# Patient Record
Sex: FEMALE | Race: WHITE | NOT HISPANIC OR LATINO | Employment: FULL TIME | URBAN - METROPOLITAN AREA
[De-identification: names, ages, dates, MRNs, and addresses within clinical notes are randomized per-mention and may not be internally consistent; named-entity substitution may affect disease eponyms.]

---

## 2017-09-15 ENCOUNTER — OFFICE VISIT (OUTPATIENT)
Dept: OBSTETRICS AND GYNECOLOGY | Age: 52
End: 2017-09-15

## 2017-09-15 DIAGNOSIS — E03.4 HYPOTHYROIDISM DUE TO ACQUIRED ATROPHY OF THYROID: ICD-10-CM

## 2017-09-15 DIAGNOSIS — Z00.00 ANNUAL PHYSICAL EXAM: Primary | ICD-10-CM

## 2017-09-15 DIAGNOSIS — N32.81 OAB (OVERACTIVE BLADDER): ICD-10-CM

## 2017-09-15 DIAGNOSIS — N39.3 SUI (STRESS URINARY INCONTINENCE, FEMALE): ICD-10-CM

## 2017-09-15 PROCEDURE — 99396 PREV VISIT EST AGE 40-64: CPT | Performed by: OBSTETRICS & GYNECOLOGY

## 2017-09-15 RX ORDER — LEVOTHYROXINE SODIUM 112 UG/1
112 TABLET ORAL DAILY
COMMUNITY

## 2017-09-15 NOTE — PROGRESS NOTES
Subjective   Mar Bartholomew is a 52 y.o. female is being seen today for No chief complaint on file.  .    History of Present Illness  Patient is here for an annual check.  The big pictures she doing very well as are her daughters and .  Her mom history of breast cancer came up her: cholangio carcinoma she is taking chemotherapy for that.  His no other new family history.  She does complain of OAB and PAIGE.  They're both discussed in detail and I will refer her for possible TVT.  Otherwise her bowels are doing well and besides a bit of weight gain.  No other Complaints today did talk about thyroid medication and possibly checking T3-T4 which I do not see in the chart  The following portions of the patient's history were reviewed and updated as appropriate: allergies, current medications, past family history, past medical history, past social history, past surgical history and problem list.    There were no vitals filed for this visit.    PAST MEDICAL HISTORY  No past medical history on file.  OB History      Para Term  AB TAB SAB Ectopic Multiple Living    3 3 3               Past Surgical History:   Procedure Laterality Date   • BREAST BIOPSY       Family History   Problem Relation Age of Onset   • Breast cancer Mother    • No Known Problems Father    • No Known Problems Sister    • No Known Problems Brother    • No Known Problems Daughter    • No Known Problems Son    • No Known Problems Maternal Grandmother    • No Known Problems Paternal Grandmother    • No Known Problems Maternal Aunt    • No Known Problems Paternal Aunt    • BRCA 1/2 Neg Hx    • Colon cancer Neg Hx    • Endometrial cancer Neg Hx    • Ovarian cancer Neg Hx      History   Smoking Status   • Not on file   Smokeless Tobacco   • Not on file       Current Outpatient Prescriptions:   •  levothyroxine (SYNTHROID, LEVOTHROID) 100 MCG tablet, Take 100 mcg by mouth., Disp: , Rfl:     There is no immunization history on file for this  patient.    Review of Systems   Constitutional: Negative for chills, fatigue, fever and unexpected weight change.   Respiratory: Negative for shortness of breath and wheezing.    Cardiovascular: Negative for chest pain.   Gastrointestinal: Negative for abdominal distention, abdominal pain, blood in stool, constipation, diarrhea and nausea.   Genitourinary: Positive for frequency and urgency. Negative for difficulty urinating, dyspareunia, dysuria, hematuria, menstrual problem, pelvic pain and vaginal discharge.   Skin: Negative for rash.       Objective   Physical Exam   Constitutional: She is oriented to person, place, and time. Vital signs are normal. She appears well-developed and well-nourished.   Neck: No thyromegaly present.   Cardiovascular: Normal rate, regular rhythm and normal heart sounds.    Pulmonary/Chest: Effort normal. Right breast exhibits no inverted nipple, no mass, no nipple discharge, no skin change and no tenderness. Left breast exhibits no inverted nipple, no mass, no nipple discharge, no skin change and no tenderness. Breasts are symmetrical. There is no breast swelling.   Abdominal: Soft.   Genitourinary: Vagina normal and uterus normal. No breast tenderness, discharge or bleeding. Pelvic exam was performed with patient supine. No labial fusion. There is no rash, tenderness, lesion or injury on the right labia. There is no rash, tenderness, lesion or injury on the left labia. Cervix exhibits no motion tenderness, no discharge and no friability. Right adnexum displays no mass, no tenderness and no fullness. Left adnexum displays no mass, no tenderness and no fullness.   Neurological: She is alert and oriented to person, place, and time.   Psychiatric: She has a normal mood and affect.   Vitals reviewed.        Assessment/Plan   Diagnoses and all orders for this visit:    Annual physical exam    OAB (overactive bladder)    PAIGE (stress urinary incontinence, female)  -     Ambulatory Referral to  Urology    Hypothyroidism due to acquired atrophy of thyroid      Exam was essentially normal with some degree of pelvic relaxation but mostly I believe a urethrocele and I believe the TVT would benefit her.  Pap smear was not done today was done last year.  Mammogram was done today.  Colonoscopy very much up-to-date it was at 50 will do bone density next year.  Diet and exercise were discussed she is very good on exercise.  Come back in year

## 2017-11-14 ENCOUNTER — TRANSCRIBE ORDERS (OUTPATIENT)
Dept: ADMINISTRATIVE | Facility: HOSPITAL | Age: 52
End: 2017-11-14

## 2017-11-14 ENCOUNTER — LAB (OUTPATIENT)
Dept: LAB | Facility: HOSPITAL | Age: 52
End: 2017-11-14

## 2017-11-14 DIAGNOSIS — Z00.00 ROUTINE GENERAL MEDICAL EXAMINATION AT A HEALTH CARE FACILITY: ICD-10-CM

## 2017-11-14 DIAGNOSIS — E03.9 HYPOTHYROIDISM, UNSPECIFIED TYPE: Primary | ICD-10-CM

## 2017-11-14 DIAGNOSIS — E03.9 HYPOTHYROIDISM, UNSPECIFIED TYPE: ICD-10-CM

## 2017-11-14 LAB
ALBUMIN SERPL-MCNC: 4.2 G/DL (ref 3.5–5.2)
ALBUMIN/GLOB SERPL: 1.7 G/DL
ALP SERPL-CCNC: 50 U/L (ref 39–117)
ALT SERPL W P-5'-P-CCNC: 16 U/L (ref 1–33)
ANION GAP SERPL CALCULATED.3IONS-SCNC: 9.8 MMOL/L
AST SERPL-CCNC: 18 U/L (ref 1–32)
BASOPHILS # BLD AUTO: 0.02 10*3/MM3 (ref 0–0.2)
BASOPHILS NFR BLD AUTO: 0.5 % (ref 0–1.5)
BILIRUB SERPL-MCNC: 0.4 MG/DL (ref 0.1–1.2)
BILIRUB UR QL STRIP: NEGATIVE
BUN BLD-MCNC: 17 MG/DL (ref 6–20)
BUN/CREAT SERPL: 22.1 (ref 7–25)
CALCIUM SPEC-SCNC: 9.2 MG/DL (ref 8.6–10.5)
CHLORIDE SERPL-SCNC: 105 MMOL/L (ref 98–107)
CHOLEST SERPL-MCNC: 181 MG/DL (ref 0–200)
CLARITY UR: CLEAR
CO2 SERPL-SCNC: 26.2 MMOL/L (ref 22–29)
COLOR UR: YELLOW
CREAT BLD-MCNC: 0.77 MG/DL (ref 0.57–1)
DEPRECATED RDW RBC AUTO: 45.9 FL (ref 37–54)
EOSINOPHIL # BLD AUTO: 0.08 10*3/MM3 (ref 0–0.7)
EOSINOPHIL NFR BLD AUTO: 2 % (ref 0.3–6.2)
ERYTHROCYTE [DISTWIDTH] IN BLOOD BY AUTOMATED COUNT: 12.9 % (ref 11.7–13)
GFR SERPL CREATININE-BSD FRML MDRD: 79 ML/MIN/1.73
GLOBULIN UR ELPH-MCNC: 2.5 GM/DL
GLUCOSE BLD-MCNC: 93 MG/DL (ref 65–99)
GLUCOSE UR STRIP-MCNC: NEGATIVE MG/DL
HCT VFR BLD AUTO: 41.2 % (ref 35.6–45.5)
HDLC SERPL-MCNC: 75 MG/DL (ref 40–60)
HGB BLD-MCNC: 13 G/DL (ref 11.9–15.5)
HGB UR QL STRIP.AUTO: NEGATIVE
IMM GRANULOCYTES # BLD: 0 10*3/MM3 (ref 0–0.03)
IMM GRANULOCYTES NFR BLD: 0 % (ref 0–0.5)
KETONES UR QL STRIP: NEGATIVE
LDLC SERPL CALC-MCNC: 96 MG/DL (ref 0–100)
LDLC/HDLC SERPL: 1.28 {RATIO}
LEUKOCYTE ESTERASE UR QL STRIP.AUTO: NEGATIVE
LYMPHOCYTES # BLD AUTO: 1.69 10*3/MM3 (ref 0.9–4.8)
LYMPHOCYTES NFR BLD AUTO: 41.4 % (ref 19.6–45.3)
MCH RBC QN AUTO: 30.8 PG (ref 26.9–32)
MCHC RBC AUTO-ENTMCNC: 31.6 G/DL (ref 32.4–36.3)
MCV RBC AUTO: 97.6 FL (ref 80.5–98.2)
MONOCYTES # BLD AUTO: 0.36 10*3/MM3 (ref 0.2–1.2)
MONOCYTES NFR BLD AUTO: 8.8 % (ref 5–12)
NEUTROPHILS # BLD AUTO: 1.93 10*3/MM3 (ref 1.9–8.1)
NEUTROPHILS NFR BLD AUTO: 47.3 % (ref 42.7–76)
NITRITE UR QL STRIP: NEGATIVE
PH UR STRIP.AUTO: 6.5 [PH] (ref 5–8)
PLATELET # BLD AUTO: 236 10*3/MM3 (ref 140–500)
PMV BLD AUTO: 10.3 FL (ref 6–12)
POTASSIUM BLD-SCNC: 4.8 MMOL/L (ref 3.5–5.2)
PROT SERPL-MCNC: 6.7 G/DL (ref 6–8.5)
PROT UR QL STRIP: NEGATIVE
RBC # BLD AUTO: 4.22 10*6/MM3 (ref 3.9–5.2)
SODIUM BLD-SCNC: 141 MMOL/L (ref 136–145)
SP GR UR STRIP: 1.02 (ref 1–1.03)
TRIGL SERPL-MCNC: 50 MG/DL (ref 0–150)
TSH SERPL DL<=0.05 MIU/L-ACNC: 0.97 MIU/ML (ref 0.27–4.2)
UROBILINOGEN UR QL STRIP: NORMAL
VLDLC SERPL-MCNC: 10 MG/DL (ref 5–40)
WBC NRBC COR # BLD: 4.08 10*3/MM3 (ref 4.5–10.7)

## 2017-11-14 PROCEDURE — 80053 COMPREHEN METABOLIC PANEL: CPT | Performed by: INTERNAL MEDICINE

## 2017-11-14 PROCEDURE — 85025 COMPLETE CBC W/AUTO DIFF WBC: CPT | Performed by: INTERNAL MEDICINE

## 2017-11-14 PROCEDURE — 84443 ASSAY THYROID STIM HORMONE: CPT | Performed by: INTERNAL MEDICINE

## 2017-11-14 PROCEDURE — 80061 LIPID PANEL: CPT | Performed by: INTERNAL MEDICINE

## 2017-11-14 PROCEDURE — 36415 COLL VENOUS BLD VENIPUNCTURE: CPT

## 2017-11-14 PROCEDURE — 81003 URINALYSIS AUTO W/O SCOPE: CPT | Performed by: INTERNAL MEDICINE

## 2017-11-30 ENCOUNTER — LAB (OUTPATIENT)
Dept: LAB | Facility: HOSPITAL | Age: 52
End: 2017-11-30

## 2017-11-30 ENCOUNTER — TRANSCRIBE ORDERS (OUTPATIENT)
Dept: ADMINISTRATIVE | Facility: HOSPITAL | Age: 52
End: 2017-11-30

## 2017-11-30 DIAGNOSIS — R53.83 FATIGUE, UNSPECIFIED TYPE: ICD-10-CM

## 2017-11-30 DIAGNOSIS — M79.10 MYALGIA: ICD-10-CM

## 2017-11-30 DIAGNOSIS — M79.10 MYALGIA: Primary | ICD-10-CM

## 2017-11-30 LAB
CHROMATIN AB SERPL-ACNC: <10 IU/ML (ref 0–14)
CK SERPL-CCNC: 59 U/L (ref 20–180)
ERYTHROCYTE [SEDIMENTATION RATE] IN BLOOD: 6 MM/HR (ref 0–30)

## 2017-11-30 PROCEDURE — 86431 RHEUMATOID FACTOR QUANT: CPT | Performed by: INTERNAL MEDICINE

## 2017-11-30 PROCEDURE — 85652 RBC SED RATE AUTOMATED: CPT | Performed by: INTERNAL MEDICINE

## 2017-11-30 PROCEDURE — 82550 ASSAY OF CK (CPK): CPT | Performed by: INTERNAL MEDICINE

## 2017-11-30 PROCEDURE — 36415 COLL VENOUS BLD VENIPUNCTURE: CPT

## 2017-11-30 PROCEDURE — 86038 ANTINUCLEAR ANTIBODIES: CPT | Performed by: INTERNAL MEDICINE

## 2017-12-01 LAB — ANA SER QL: NEGATIVE

## 2018-05-25 ENCOUNTER — LAB (OUTPATIENT)
Dept: LAB | Facility: HOSPITAL | Age: 53
End: 2018-05-25

## 2018-05-25 ENCOUNTER — TRANSCRIBE ORDERS (OUTPATIENT)
Dept: ADMINISTRATIVE | Facility: HOSPITAL | Age: 53
End: 2018-05-25

## 2018-05-25 DIAGNOSIS — E03.9 HYPOTHYROIDISM, UNSPECIFIED TYPE: Primary | ICD-10-CM

## 2018-05-25 DIAGNOSIS — E03.9 HYPOTHYROIDISM, UNSPECIFIED TYPE: ICD-10-CM

## 2018-05-25 LAB — TSH SERPL DL<=0.05 MIU/L-ACNC: 2.4 MIU/ML (ref 0.27–4.2)

## 2018-05-25 PROCEDURE — 36415 COLL VENOUS BLD VENIPUNCTURE: CPT

## 2018-05-25 PROCEDURE — 84443 ASSAY THYROID STIM HORMONE: CPT | Performed by: INTERNAL MEDICINE

## 2018-09-06 ENCOUNTER — TELEPHONE (OUTPATIENT)
Dept: OBSTETRICS AND GYNECOLOGY | Age: 53
End: 2018-09-06

## 2018-09-19 ENCOUNTER — APPOINTMENT (OUTPATIENT)
Dept: WOMENS IMAGING | Facility: HOSPITAL | Age: 53
End: 2018-09-19

## 2018-09-19 ENCOUNTER — OFFICE VISIT (OUTPATIENT)
Dept: OBSTETRICS AND GYNECOLOGY | Age: 53
End: 2018-09-19

## 2018-09-19 VITALS
WEIGHT: 183 LBS | BODY MASS INDEX: 26.2 KG/M2 | DIASTOLIC BLOOD PRESSURE: 78 MMHG | HEIGHT: 70 IN | SYSTOLIC BLOOD PRESSURE: 112 MMHG

## 2018-09-19 DIAGNOSIS — N95.1 MENOPAUSAL SYMPTOMS: ICD-10-CM

## 2018-09-19 DIAGNOSIS — Z00.00 ANNUAL PHYSICAL EXAM: ICD-10-CM

## 2018-09-19 DIAGNOSIS — Z11.51 SCREENING FOR HPV (HUMAN PAPILLOMAVIRUS): ICD-10-CM

## 2018-09-19 DIAGNOSIS — N39.3 SUI (STRESS URINARY INCONTINENCE, FEMALE): ICD-10-CM

## 2018-09-19 DIAGNOSIS — Z01.419 WELL WOMAN EXAM WITH ROUTINE GYNECOLOGICAL EXAM: Primary | ICD-10-CM

## 2018-09-19 PROCEDURE — 77080 DXA BONE DENSITY AXIAL: CPT | Performed by: RADIOLOGY

## 2018-09-19 PROCEDURE — 77067 SCR MAMMO BI INCL CAD: CPT | Performed by: RADIOLOGY

## 2018-09-19 PROCEDURE — 77063 BREAST TOMOSYNTHESIS BI: CPT | Performed by: RADIOLOGY

## 2018-09-19 PROCEDURE — 99396 PREV VISIT EST AGE 40-64: CPT | Performed by: OBSTETRICS & GYNECOLOGY

## 2018-09-19 NOTE — PROGRESS NOTES
Subjective   Mar Bartholomew is a 53 y.o. female is being seen today for   Chief Complaint   Patient presents with   • Gynecologic Exam     AE.  2015, neg pap, neg HR-HPV.   Dexa and MG today @ [facility].  Hx of OAB and PAIGE. Cscope 2015 normal repeat in ten years.    .    History of Present Illness  Patient is here for an annual check.  Just a happens at her oldest daughter was here yesterday with a new OB visit with Dr. yanes.  Her family is doing well nothing new this year.  Also really nothing new with the patient herself.  She did go see Geoffrey and he did recommend a TVT and she is still thinking about that but definitely still has stress incontinence not as much OAB.  She lost 3 pounds physical but that still exercising well.  Also has a few menopausal symptoms including especially hot flashes.  So talked in detail about that again and all the options for now she doing over-the-counter which seems to help some.  Otherwise her bowels work well  The following portions of the patient's history were reviewed and updated as appropriate: allergies, current medications, past family history, past medical history, past social history, past surgical history and problem list.    Vitals:    18 1017   BP: 112/78       PAST MEDICAL HISTORY  No past medical history on file.  OB History      Para Term  AB Living    3 3 3          SAB TAB Ectopic Molar Multiple Live Births                       Past Surgical History:   Procedure Laterality Date   • BREAST BIOPSY       Family History   Problem Relation Age of Onset   • Breast cancer Mother    • No Known Problems Father    • No Known Problems Sister    • No Known Problems Brother    • No Known Problems Daughter    • No Known Problems Son    • No Known Problems Maternal Grandmother    • No Known Problems Paternal Grandmother    • No Known Problems Maternal Aunt    • No Known Problems Paternal Aunt    • BRCA 1/2 Neg Hx    • Colon cancer Neg Hx    • Endometrial  cancer Neg Hx    • Ovarian cancer Neg Hx      History   Smoking Status   • Never Smoker   Smokeless Tobacco   • Never Used       Current Outpatient Prescriptions:   •  Biotin 2.5 MG capsule, Take  by mouth., Disp: , Rfl:   •  levothyroxine (SYNTHROID, LEVOTHROID) 100 MCG tablet, Take 100 mcg by mouth., Disp: , Rfl:   •  Multiple Vitamins-Minerals (MULTIVITAMIN WITH MINERALS) tablet tablet, Take 1 tablet by mouth Daily., Disp: , Rfl:   •  pyridoxine (VITAMIN B-6) 250 MG tablet tablet, Take 250 mg by mouth Daily., Disp: , Rfl:   •  Unable to find, 1 each Daily. otc estroven, Disp: , Rfl:     There is no immunization history on file for this patient.    Review of Systems   Constitutional: Negative for chills, fatigue, fever and unexpected weight change.   Respiratory: Negative for shortness of breath and wheezing.    Cardiovascular: Negative for chest pain.   Gastrointestinal: Negative for abdominal distention, abdominal pain, blood in stool, constipation, diarrhea and nausea.   Genitourinary: Negative for difficulty urinating, dyspareunia, dysuria, frequency, hematuria, menstrual problem, pelvic pain, urgency and vaginal discharge.   Skin: Negative for rash.       Objective   Physical Exam   Constitutional: She is oriented to person, place, and time. Vital signs are normal. She appears well-developed and well-nourished.   Neck: No thyromegaly present.   Cardiovascular: Normal rate, regular rhythm and normal heart sounds.    Pulmonary/Chest: Effort normal. Right breast exhibits no inverted nipple, no mass, no nipple discharge, no skin change and no tenderness. Left breast exhibits no inverted nipple, no mass, no nipple discharge, no skin change and no tenderness. Breasts are symmetrical. There is no breast swelling.   Abdominal: Soft.   Genitourinary: Vagina normal and uterus normal. No breast tenderness, discharge or bleeding. Pelvic exam was performed with patient supine. No labial fusion. There is no rash,  tenderness, lesion or injury on the right labia. There is no rash, tenderness, lesion or injury on the left labia. Cervix exhibits no motion tenderness, no discharge and no friability. Right adnexum displays no mass, no tenderness and no fullness. Left adnexum displays no mass, no tenderness and no fullness.   Neurological: She is alert and oriented to person, place, and time.   Psychiatric: She has a normal mood and affect.   Vitals reviewed.        Assessment/Plan   aMr was seen today for gynecologic exam.    Diagnoses and all orders for this visit:    Well woman exam with routine gynecological exam  -     PapIG, HPV, Rfx 16 / 18    Screening for HPV (human papillomavirus)  -     PapIG, HPV, Rfx 16 / 18    Annual physical exam    PAIGE (stress urinary incontinence, female)    Menopausal symptoms      Exam is normal but there is definitely a degree of pelvic relaxation.  But she has no other symptoms from that.  Mammogram was done today.  Bone density done today out of the office.  Colonoscopy was at 50.  No bleeding since the ablation again talked about diet exercise come back in year

## 2018-09-21 LAB
CYTOLOGIST CVX/VAG CYTO: NORMAL
CYTOLOGY CVX/VAG DOC THIN PREP: NORMAL
DX ICD CODE: NORMAL
HIV 1 & 2 AB SER-IMP: NORMAL
HPV I/H RISK 1 DNA CVX QL PROBE+SIG AMP: NEGATIVE
OTHER STN SPEC: NORMAL
PATH REPORT.FINAL DX SPEC: NORMAL
STAT OF ADQ CVX/VAG CYTO-IMP: NORMAL

## 2018-10-31 ENCOUNTER — APPOINTMENT (OUTPATIENT)
Dept: WOMENS IMAGING | Facility: HOSPITAL | Age: 53
End: 2018-10-31

## 2018-10-31 PROCEDURE — 76641 ULTRASOUND BREAST COMPLETE: CPT | Performed by: RADIOLOGY

## 2018-10-31 PROCEDURE — 77065 DX MAMMO INCL CAD UNI: CPT | Performed by: RADIOLOGY

## 2018-10-31 PROCEDURE — G0279 TOMOSYNTHESIS, MAMMO: HCPCS | Performed by: RADIOLOGY

## 2018-10-31 PROCEDURE — 77061 BREAST TOMOSYNTHESIS UNI: CPT | Performed by: RADIOLOGY

## 2018-12-21 ENCOUNTER — TELEPHONE (OUTPATIENT)
Dept: OBSTETRICS AND GYNECOLOGY | Age: 53
End: 2018-12-21

## 2018-12-21 RX ORDER — ESTRADIOL 0.04 MG/D
1 FILM, EXTENDED RELEASE TRANSDERMAL 2 TIMES WEEKLY
Qty: 8 PATCH | Refills: 1 | Status: SHIPPED | OUTPATIENT
Start: 2018-12-24 | End: 2019-01-18 | Stop reason: SDUPTHER

## 2018-12-21 NOTE — TELEPHONE ENCOUNTER
PT IS EXPERIENCING TERRIBLE HOT FLASHES AND NIGHT SWEATS, WOULD LIKE TO START HRT PATCH. CAN WE CALL IN?

## 2019-01-16 ENCOUNTER — TRANSCRIBE ORDERS (OUTPATIENT)
Dept: ADMINISTRATIVE | Facility: HOSPITAL | Age: 54
End: 2019-01-16

## 2019-01-16 ENCOUNTER — LAB (OUTPATIENT)
Dept: LAB | Facility: HOSPITAL | Age: 54
End: 2019-01-16

## 2019-01-16 DIAGNOSIS — E03.9 MYXEDEMA HEART DISEASE: ICD-10-CM

## 2019-01-16 DIAGNOSIS — I51.9 MYXEDEMA HEART DISEASE: ICD-10-CM

## 2019-01-16 DIAGNOSIS — Z00.00 ROUTINE GENERAL MEDICAL EXAMINATION AT A HEALTH CARE FACILITY: ICD-10-CM

## 2019-01-16 DIAGNOSIS — Z00.00 ROUTINE GENERAL MEDICAL EXAMINATION AT A HEALTH CARE FACILITY: Primary | ICD-10-CM

## 2019-01-16 LAB
ALBUMIN SERPL-MCNC: 4 G/DL (ref 3.5–5.2)
ALBUMIN/GLOB SERPL: 1.8 G/DL
ALP SERPL-CCNC: 46 U/L (ref 39–117)
ALT SERPL W P-5'-P-CCNC: 15 U/L (ref 1–33)
ANION GAP SERPL CALCULATED.3IONS-SCNC: 9.8 MMOL/L
AST SERPL-CCNC: 19 U/L (ref 1–32)
BASOPHILS # BLD AUTO: 0.02 10*3/MM3 (ref 0–0.2)
BASOPHILS NFR BLD AUTO: 0.5 % (ref 0–1.5)
BILIRUB SERPL-MCNC: 0.6 MG/DL (ref 0.1–1.2)
BILIRUB UR QL STRIP: NEGATIVE
BUN BLD-MCNC: 13 MG/DL (ref 6–20)
BUN/CREAT SERPL: 16.7 (ref 7–25)
CALCIUM SPEC-SCNC: 9.4 MG/DL (ref 8.6–10.5)
CHLORIDE SERPL-SCNC: 104 MMOL/L (ref 98–107)
CHOLEST SERPL-MCNC: 162 MG/DL (ref 0–200)
CLARITY UR: ABNORMAL
CO2 SERPL-SCNC: 27.2 MMOL/L (ref 22–29)
COLOR UR: ABNORMAL
CREAT BLD-MCNC: 0.78 MG/DL (ref 0.57–1)
DEPRECATED RDW RBC AUTO: 47.2 FL (ref 37–54)
EOSINOPHIL # BLD AUTO: 0.05 10*3/MM3 (ref 0–0.7)
EOSINOPHIL NFR BLD AUTO: 1.3 % (ref 0.3–6.2)
ERYTHROCYTE [DISTWIDTH] IN BLOOD BY AUTOMATED COUNT: 13.1 % (ref 11.7–13)
GFR SERPL CREATININE-BSD FRML MDRD: 77 ML/MIN/1.73
GLOBULIN UR ELPH-MCNC: 2.2 GM/DL
GLUCOSE BLD-MCNC: 85 MG/DL (ref 65–99)
GLUCOSE UR STRIP-MCNC: NEGATIVE MG/DL
HCT VFR BLD AUTO: 40.5 % (ref 35.6–45.5)
HDLC SERPL-MCNC: 69 MG/DL (ref 40–60)
HGB BLD-MCNC: 12.8 G/DL (ref 11.9–15.5)
HGB UR QL STRIP.AUTO: NEGATIVE
IMM GRANULOCYTES # BLD AUTO: 0 10*3/MM3 (ref 0–0.03)
IMM GRANULOCYTES NFR BLD AUTO: 0 % (ref 0–0.5)
KETONES UR QL STRIP: ABNORMAL
LDLC SERPL CALC-MCNC: 83 MG/DL (ref 0–100)
LDLC/HDLC SERPL: 1.2 {RATIO}
LEUKOCYTE ESTERASE UR QL STRIP.AUTO: NEGATIVE
LYMPHOCYTES # BLD AUTO: 1.65 10*3/MM3 (ref 0.9–4.8)
LYMPHOCYTES NFR BLD AUTO: 43.2 % (ref 19.6–45.3)
MCH RBC QN AUTO: 31.2 PG (ref 26.9–32)
MCHC RBC AUTO-ENTMCNC: 31.6 G/DL (ref 32.4–36.3)
MCV RBC AUTO: 98.8 FL (ref 80.5–98.2)
MONOCYTES # BLD AUTO: 0.35 10*3/MM3 (ref 0.2–1.2)
MONOCYTES NFR BLD AUTO: 9.2 % (ref 5–12)
NEUTROPHILS # BLD AUTO: 1.75 10*3/MM3 (ref 1.9–8.1)
NEUTROPHILS NFR BLD AUTO: 45.8 % (ref 42.7–76)
NITRITE UR QL STRIP: NEGATIVE
PH UR STRIP.AUTO: 6.5 [PH] (ref 5–8)
PLATELET # BLD AUTO: 253 10*3/MM3 (ref 140–500)
PMV BLD AUTO: 10.5 FL (ref 6–12)
POTASSIUM BLD-SCNC: 4.2 MMOL/L (ref 3.5–5.2)
PROT SERPL-MCNC: 6.2 G/DL (ref 6–8.5)
PROT UR QL STRIP: NEGATIVE
RBC # BLD AUTO: 4.1 10*6/MM3 (ref 3.9–5.2)
SODIUM BLD-SCNC: 141 MMOL/L (ref 136–145)
SP GR UR STRIP: 1.03 (ref 1–1.03)
TRIGL SERPL-MCNC: 50 MG/DL (ref 0–150)
TSH SERPL DL<=0.05 MIU/L-ACNC: 1.18 MIU/ML (ref 0.27–4.2)
UROBILINOGEN UR QL STRIP: ABNORMAL
VLDLC SERPL-MCNC: 10 MG/DL (ref 5–40)
WBC NRBC COR # BLD: 3.82 10*3/MM3 (ref 4.5–10.7)

## 2019-01-16 PROCEDURE — 81003 URINALYSIS AUTO W/O SCOPE: CPT | Performed by: INTERNAL MEDICINE

## 2019-01-16 PROCEDURE — 84443 ASSAY THYROID STIM HORMONE: CPT | Performed by: INTERNAL MEDICINE

## 2019-01-16 PROCEDURE — 80053 COMPREHEN METABOLIC PANEL: CPT | Performed by: INTERNAL MEDICINE

## 2019-01-16 PROCEDURE — 80061 LIPID PANEL: CPT | Performed by: INTERNAL MEDICINE

## 2019-01-16 PROCEDURE — 85025 COMPLETE CBC W/AUTO DIFF WBC: CPT | Performed by: INTERNAL MEDICINE

## 2019-01-16 PROCEDURE — 36415 COLL VENOUS BLD VENIPUNCTURE: CPT

## 2019-01-17 ENCOUNTER — TELEPHONE (OUTPATIENT)
Dept: OBSTETRICS AND GYNECOLOGY | Age: 54
End: 2019-01-17

## 2019-01-17 NOTE — TELEPHONE ENCOUNTER
Received fax from [facility] requesting 90 day rx for:   ESTRADIOL (V) TD PATCH 8'S  0.0375 MG  #24  4 RF  ..  Dr. Ortiz  Last ae 9/19/18  Next ae 9/25/19

## 2019-01-18 RX ORDER — ESTRADIOL 0.04 MG/D
1 FILM, EXTENDED RELEASE TRANSDERMAL 2 TIMES WEEKLY
Qty: 24 PATCH | Refills: 1 | Status: SHIPPED | OUTPATIENT
Start: 2019-01-21 | End: 2019-11-21

## 2019-08-20 ENCOUNTER — LAB (OUTPATIENT)
Dept: LAB | Facility: HOSPITAL | Age: 54
End: 2019-08-20

## 2019-08-20 ENCOUNTER — TRANSCRIBE ORDERS (OUTPATIENT)
Dept: ADMINISTRATIVE | Facility: HOSPITAL | Age: 54
End: 2019-08-20

## 2019-08-20 DIAGNOSIS — D72.819 LEUKOPENIA, UNSPECIFIED TYPE: ICD-10-CM

## 2019-08-20 DIAGNOSIS — E03.9 MYXEDEMA HEART DISEASE: ICD-10-CM

## 2019-08-20 DIAGNOSIS — I51.9 MYXEDEMA HEART DISEASE: ICD-10-CM

## 2019-08-20 DIAGNOSIS — E03.9 MYXEDEMA HEART DISEASE: Primary | ICD-10-CM

## 2019-08-20 DIAGNOSIS — I51.9 MYXEDEMA HEART DISEASE: Primary | ICD-10-CM

## 2019-08-20 LAB
BASOPHILS # BLD AUTO: 0.05 10*3/MM3 (ref 0–0.2)
BASOPHILS NFR BLD AUTO: 1.2 % (ref 0–1.5)
DEPRECATED RDW RBC AUTO: 45.4 FL (ref 37–54)
EOSINOPHIL # BLD AUTO: 0.13 10*3/MM3 (ref 0–0.4)
EOSINOPHIL NFR BLD AUTO: 3.2 % (ref 0.3–6.2)
ERYTHROCYTE [DISTWIDTH] IN BLOOD BY AUTOMATED COUNT: 12.8 % (ref 12.3–15.4)
HCT VFR BLD AUTO: 41.7 % (ref 34–46.6)
HGB BLD-MCNC: 13.3 G/DL (ref 12–15.9)
IMM GRANULOCYTES # BLD AUTO: 0.01 10*3/MM3 (ref 0–0.05)
IMM GRANULOCYTES NFR BLD AUTO: 0.2 % (ref 0–0.5)
LYMPHOCYTES # BLD AUTO: 1.85 10*3/MM3 (ref 0.7–3.1)
LYMPHOCYTES NFR BLD AUTO: 45.2 % (ref 19.6–45.3)
MCH RBC QN AUTO: 31.1 PG (ref 26.6–33)
MCHC RBC AUTO-ENTMCNC: 31.9 G/DL (ref 31.5–35.7)
MCV RBC AUTO: 97.4 FL (ref 79–97)
MONOCYTES # BLD AUTO: 0.32 10*3/MM3 (ref 0.1–0.9)
MONOCYTES NFR BLD AUTO: 7.8 % (ref 5–12)
NEUTROPHILS # BLD AUTO: 1.73 10*3/MM3 (ref 1.7–7)
NEUTROPHILS NFR BLD AUTO: 42.4 % (ref 42.7–76)
NRBC BLD AUTO-RTO: 0 /100 WBC (ref 0–0.2)
PLATELET # BLD AUTO: 227 10*3/MM3 (ref 140–450)
PMV BLD AUTO: 10.3 FL (ref 6–12)
RBC # BLD AUTO: 4.28 10*6/MM3 (ref 3.77–5.28)
TSH SERPL DL<=0.05 MIU/L-ACNC: 2.45 MIU/ML (ref 0.27–4.2)
WBC NRBC COR # BLD: 4.09 10*3/MM3 (ref 3.4–10.8)

## 2019-08-20 PROCEDURE — 36415 COLL VENOUS BLD VENIPUNCTURE: CPT

## 2019-08-20 PROCEDURE — 84443 ASSAY THYROID STIM HORMONE: CPT | Performed by: INTERNAL MEDICINE

## 2019-08-20 PROCEDURE — 85025 COMPLETE CBC W/AUTO DIFF WBC: CPT | Performed by: INTERNAL MEDICINE

## 2019-11-21 ENCOUNTER — OFFICE VISIT (OUTPATIENT)
Dept: OBSTETRICS AND GYNECOLOGY | Age: 54
End: 2019-11-21

## 2019-11-21 ENCOUNTER — PROCEDURE VISIT (OUTPATIENT)
Dept: OBSTETRICS AND GYNECOLOGY | Age: 54
End: 2019-11-21

## 2019-11-21 ENCOUNTER — APPOINTMENT (OUTPATIENT)
Dept: WOMENS IMAGING | Facility: HOSPITAL | Age: 54
End: 2019-11-21

## 2019-11-21 VITALS
BODY MASS INDEX: 26.69 KG/M2 | HEIGHT: 70 IN | DIASTOLIC BLOOD PRESSURE: 80 MMHG | SYSTOLIC BLOOD PRESSURE: 118 MMHG | WEIGHT: 186.4 LBS

## 2019-11-21 DIAGNOSIS — N95.1 MENOPAUSAL SYMPTOMS: ICD-10-CM

## 2019-11-21 DIAGNOSIS — N39.3 SUI (STRESS URINARY INCONTINENCE, FEMALE): ICD-10-CM

## 2019-11-21 DIAGNOSIS — Z00.00 ANNUAL PHYSICAL EXAM: Primary | ICD-10-CM

## 2019-11-21 DIAGNOSIS — Z12.31 VISIT FOR SCREENING MAMMOGRAM: Primary | ICD-10-CM

## 2019-11-21 PROCEDURE — 77067 SCR MAMMO BI INCL CAD: CPT | Performed by: RADIOLOGY

## 2019-11-21 PROCEDURE — 99396 PREV VISIT EST AGE 40-64: CPT | Performed by: OBSTETRICS & GYNECOLOGY

## 2019-11-21 PROCEDURE — 77067 SCR MAMMO BI INCL CAD: CPT | Performed by: OBSTETRICS & GYNECOLOGY

## 2019-11-21 NOTE — PROGRESS NOTES
Subjective   Mar Bartholomew is a 54 y.o. female is being seen today for   Chief Complaint   Patient presents with   • Annual Exam     Pap 2018, MG today, DEXA 2018, C-scope    .    History of Present Illness  Patient is here for annual check.  Big news is she lost them on this year recurrent liver cancer about 3 daughters and granddaughter are all doing well.  Nothing in the family bowels working well bladder still has her problems and she had an appointment already to do the TVT but when mom passed that come to delay things so at some point she will get back to that physician.  She also seems to have a reaction to the patch has her irritating her skin big time so I talked to the different options going try CombiPatch for now that is were probably go to a pill.  No other complaints today or during the past year  The following portions of the patient's history were reviewed and updated as appropriate: allergies, current medications, past family history, past medical history, past social history, past surgical history and problem list.    Vitals:    19 0908   BP: 118/80       PAST MEDICAL HISTORY  History reviewed. No pertinent past medical history.  OB History      Para Term  AB Living    3 3 3          SAB TAB Ectopic Molar Multiple Live Births                       Past Surgical History:   Procedure Laterality Date   • BREAST BIOPSY       Family History   Problem Relation Age of Onset   • Breast cancer Mother    • No Known Problems Father    • No Known Problems Sister    • No Known Problems Brother    • No Known Problems Daughter    • No Known Problems Son    • No Known Problems Maternal Grandmother    • No Known Problems Paternal Grandmother    • No Known Problems Maternal Aunt    • No Known Problems Paternal Aunt    • BRCA 1/2 Neg Hx    • Colon cancer Neg Hx    • Endometrial cancer Neg Hx    • Ovarian cancer Neg Hx      Social History     Tobacco Use   Smoking Status Never Smoker    Smokeless Tobacco Never Used       Current Outpatient Medications:   •  levothyroxine (SYNTHROID, LEVOTHROID) 100 MCG tablet, Take 100 mcg by mouth., Disp: , Rfl:   •  Multiple Vitamins-Minerals (MULTIVITAMIN WITH MINERALS) tablet tablet, Take 1 tablet by mouth Daily., Disp: , Rfl:   •  Unable to find, 1 each Daily. otc estroven, Disp: , Rfl:   •  Biotin 2.5 MG capsule, Take  by mouth., Disp: , Rfl:   •  estradiol-norethindrone (COMBIPATCH) 0.05-0.14 MG/DAY patch, Place 1 patch on the skin as directed by provider 2 (Two) Times a Week., Disp: 8 patch, Rfl: 2  •  pyridoxine (VITAMIN B-6) 250 MG tablet tablet, Take 250 mg by mouth Daily., Disp: , Rfl:     There is no immunization history on file for this patient.    Review of Systems   Constitutional: Negative for chills, fatigue, fever and unexpected weight change.   Respiratory: Negative for shortness of breath and wheezing.    Cardiovascular: Negative for chest pain.   Gastrointestinal: Negative for abdominal distention, abdominal pain, blood in stool, constipation, diarrhea and nausea.   Genitourinary: Negative for difficulty urinating, dyspareunia, dysuria, frequency, hematuria, menstrual problem, pelvic pain, urgency and vaginal discharge.   Skin: Negative for rash.       Objective   Physical Exam   Constitutional: She is oriented to person, place, and time. Vital signs are normal. She appears well-developed and well-nourished.   Neck: No thyromegaly present.   Cardiovascular: Normal rate, regular rhythm and normal heart sounds.   Pulmonary/Chest: Effort normal. Right breast exhibits no inverted nipple, no mass, no nipple discharge, no skin change and no tenderness. Left breast exhibits no inverted nipple, no mass, no nipple discharge, no skin change and no tenderness. Breasts are symmetrical. There is no breast swelling.   Abdominal: Soft.   Genitourinary: Vagina normal and uterus normal. No breast tenderness, discharge or bleeding. Pelvic exam was performed  with patient supine. No labial fusion. There is no rash, tenderness, lesion or injury on the right labia. There is no rash, tenderness, lesion or injury on the left labia. Cervix exhibits no motion tenderness, no discharge and no friability. Right adnexum displays no mass, no tenderness and no fullness. Left adnexum displays no mass, no tenderness and no fullness.   Neurological: She is alert and oriented to person, place, and time.   Psychiatric: She has a normal mood and affect.   Vitals reviewed.        Assessment/Plan   Mar was seen today for annual exam.    Diagnoses and all orders for this visit:    Annual physical exam    PAIGE (stress urinary incontinence, female)    Menopausal symptoms    Other orders  -     estradiol-norethindrone (COMBIPATCH) 0.05-0.14 MG/DAY patch; Place 1 patch on the skin as directed by provider 2 (Two) Times a Week.      She is also going to have her left shoulder looked at it has been causing her some problems.  Normal exam today.  Pap smear done in 18 so not done today.  Mammogram is being done today.  Bone density 18 up-to-date colonoscopy 15 she will need that next year.  Exercises well come back in year but calling about the patch if there is any problem

## 2019-11-22 ENCOUNTER — TRANSCRIBE ORDERS (OUTPATIENT)
Dept: ADMINISTRATIVE | Facility: HOSPITAL | Age: 54
End: 2019-11-22

## 2019-11-22 ENCOUNTER — HOSPITAL ENCOUNTER (OUTPATIENT)
Dept: GENERAL RADIOLOGY | Facility: HOSPITAL | Age: 54
Discharge: HOME | End: 2019-11-22
Admitting: INTERNAL MEDICINE

## 2019-11-22 DIAGNOSIS — M25.512 LEFT SHOULDER PAIN, UNSPECIFIED CHRONICITY: Primary | ICD-10-CM

## 2019-11-22 DIAGNOSIS — M25.512 LEFT SHOULDER PAIN, UNSPECIFIED CHRONICITY: ICD-10-CM

## 2019-11-22 PROCEDURE — 73030 X-RAY EXAM OF SHOULDER: CPT

## 2019-11-25 DIAGNOSIS — R92.8 ABNORMAL MAMMOGRAM: Primary | ICD-10-CM

## 2020-01-08 ENCOUNTER — TELEPHONE (OUTPATIENT)
Dept: OBSTETRICS AND GYNECOLOGY | Age: 55
End: 2020-01-08

## 2020-01-08 NOTE — TELEPHONE ENCOUNTER
LM for pt in regards to her needing to schedule her bilateral US. Pt was marked as pt to schedule.     Just reminding pt to schedule it if she hasn't already and if she already has had it done, where?

## 2020-01-15 ENCOUNTER — TELEPHONE (OUTPATIENT)
Dept: OBSTETRICS AND GYNECOLOGY | Age: 55
End: 2020-01-15

## 2020-01-15 NOTE — TELEPHONE ENCOUNTER
----- Message from Tarun Ortiz MD sent at 1/8/2020  3:37 PM EST -----  Please call and remind patient that she needed an ultrasound bilaterally of the breasts the order has been placed and she is aware of that

## 2020-01-15 NOTE — TELEPHONE ENCOUNTER
Called pt and reminded her of the bilateral US that she needed and pt stated that she is super busy at work and probably won't get to it until the beginning of February. Pt stated she has all the information to schedule it and will do so when she can.

## 2020-02-20 ENCOUNTER — TRANSCRIBE ORDERS (OUTPATIENT)
Dept: ADMINISTRATIVE | Facility: HOSPITAL | Age: 55
End: 2020-02-20

## 2020-02-20 ENCOUNTER — LAB (OUTPATIENT)
Dept: LAB | Facility: HOSPITAL | Age: 55
End: 2020-02-20

## 2020-02-20 DIAGNOSIS — E03.9 HYPOTHYROIDISM, UNSPECIFIED TYPE: ICD-10-CM

## 2020-02-20 DIAGNOSIS — Z00.00 ROUTINE GENERAL MEDICAL EXAMINATION AT A HEALTH CARE FACILITY: ICD-10-CM

## 2020-02-20 DIAGNOSIS — Z00.00 ROUTINE GENERAL MEDICAL EXAMINATION AT A HEALTH CARE FACILITY: Primary | ICD-10-CM

## 2020-02-20 LAB
ALBUMIN SERPL-MCNC: 4.2 G/DL (ref 3.5–5.2)
ALBUMIN/GLOB SERPL: 2 G/DL
ALP SERPL-CCNC: 60 U/L (ref 39–117)
ALT SERPL W P-5'-P-CCNC: 18 U/L (ref 1–33)
ANION GAP SERPL CALCULATED.3IONS-SCNC: 9 MMOL/L (ref 5–15)
AST SERPL-CCNC: 20 U/L (ref 1–32)
BASOPHILS # BLD AUTO: 0.03 10*3/MM3 (ref 0–0.2)
BASOPHILS NFR BLD AUTO: 0.7 % (ref 0–1.5)
BILIRUB SERPL-MCNC: 0.2 MG/DL (ref 0.2–1.2)
BILIRUB UR QL STRIP: NEGATIVE
BUN BLD-MCNC: 18 MG/DL (ref 6–20)
BUN/CREAT SERPL: 25 (ref 7–25)
CALCIUM SPEC-SCNC: 9.2 MG/DL (ref 8.6–10.5)
CHLORIDE SERPL-SCNC: 105 MMOL/L (ref 98–107)
CHOLEST SERPL-MCNC: 171 MG/DL (ref 0–200)
CLARITY UR: CLEAR
CO2 SERPL-SCNC: 25 MMOL/L (ref 22–29)
COLOR UR: YELLOW
CREAT BLD-MCNC: 0.72 MG/DL (ref 0.57–1)
DEPRECATED RDW RBC AUTO: 42 FL (ref 37–54)
EOSINOPHIL # BLD AUTO: 0.09 10*3/MM3 (ref 0–0.4)
EOSINOPHIL NFR BLD AUTO: 2 % (ref 0.3–6.2)
ERYTHROCYTE [DISTWIDTH] IN BLOOD BY AUTOMATED COUNT: 12.3 % (ref 12.3–15.4)
GFR SERPL CREATININE-BSD FRML MDRD: 84 ML/MIN/1.73
GLOBULIN UR ELPH-MCNC: 2.1 GM/DL
GLUCOSE BLD-MCNC: 89 MG/DL (ref 65–99)
GLUCOSE UR STRIP-MCNC: NEGATIVE MG/DL
HCT VFR BLD AUTO: 40.8 % (ref 34–46.6)
HDLC SERPL-MCNC: 60 MG/DL (ref 40–60)
HGB BLD-MCNC: 13.5 G/DL (ref 12–15.9)
HGB UR QL STRIP.AUTO: NEGATIVE
IMM GRANULOCYTES # BLD AUTO: 0.01 10*3/MM3 (ref 0–0.05)
IMM GRANULOCYTES NFR BLD AUTO: 0.2 % (ref 0–0.5)
KETONES UR QL STRIP: NEGATIVE
LDLC SERPL CALC-MCNC: 103 MG/DL (ref 0–100)
LDLC/HDLC SERPL: 1.71 {RATIO}
LEUKOCYTE ESTERASE UR QL STRIP.AUTO: NEGATIVE
LYMPHOCYTES # BLD AUTO: 1.96 10*3/MM3 (ref 0.7–3.1)
LYMPHOCYTES NFR BLD AUTO: 44.5 % (ref 19.6–45.3)
MCH RBC QN AUTO: 31 PG (ref 26.6–33)
MCHC RBC AUTO-ENTMCNC: 33.1 G/DL (ref 31.5–35.7)
MCV RBC AUTO: 93.6 FL (ref 79–97)
MONOCYTES # BLD AUTO: 0.33 10*3/MM3 (ref 0.1–0.9)
MONOCYTES NFR BLD AUTO: 7.5 % (ref 5–12)
NEUTROPHILS # BLD AUTO: 1.98 10*3/MM3 (ref 1.7–7)
NEUTROPHILS NFR BLD AUTO: 45.1 % (ref 42.7–76)
NITRITE UR QL STRIP: NEGATIVE
NRBC BLD AUTO-RTO: 0 /100 WBC (ref 0–0.2)
PH UR STRIP.AUTO: 5.5 [PH] (ref 5–8)
PLATELET # BLD AUTO: 302 10*3/MM3 (ref 140–450)
PMV BLD AUTO: 9.9 FL (ref 6–12)
POTASSIUM BLD-SCNC: 4.2 MMOL/L (ref 3.5–5.2)
PROT SERPL-MCNC: 6.3 G/DL (ref 6–8.5)
PROT UR QL STRIP: NEGATIVE
RBC # BLD AUTO: 4.36 10*6/MM3 (ref 3.77–5.28)
SODIUM BLD-SCNC: 139 MMOL/L (ref 136–145)
SP GR UR STRIP: >=1.03 (ref 1–1.03)
TRIGL SERPL-MCNC: 41 MG/DL (ref 0–150)
TSH SERPL DL<=0.05 MIU/L-ACNC: 0.94 UIU/ML (ref 0.27–4.2)
UROBILINOGEN UR QL STRIP: NORMAL
VLDLC SERPL-MCNC: 8.2 MG/DL (ref 5–40)
WBC NRBC COR # BLD: 4.4 10*3/MM3 (ref 3.4–10.8)

## 2020-02-20 PROCEDURE — 80053 COMPREHEN METABOLIC PANEL: CPT | Performed by: INTERNAL MEDICINE

## 2020-02-20 PROCEDURE — 85025 COMPLETE CBC W/AUTO DIFF WBC: CPT | Performed by: INTERNAL MEDICINE

## 2020-02-20 PROCEDURE — 80061 LIPID PANEL: CPT | Performed by: INTERNAL MEDICINE

## 2020-02-20 PROCEDURE — 84443 ASSAY THYROID STIM HORMONE: CPT | Performed by: INTERNAL MEDICINE

## 2020-02-20 PROCEDURE — 81003 URINALYSIS AUTO W/O SCOPE: CPT | Performed by: INTERNAL MEDICINE

## 2020-02-20 PROCEDURE — 36415 COLL VENOUS BLD VENIPUNCTURE: CPT

## 2020-03-05 ENCOUNTER — PREP FOR SURGERY (OUTPATIENT)
Dept: OTHER | Facility: HOSPITAL | Age: 55
End: 2020-03-05

## 2020-03-05 DIAGNOSIS — E03.9 HYPOTHYROIDISM, UNSPECIFIED TYPE: ICD-10-CM

## 2020-03-05 DIAGNOSIS — Z80.0 FAMILY HISTORY OF COLON CANCER: ICD-10-CM

## 2020-03-05 DIAGNOSIS — Z12.11 SCREEN FOR COLON CANCER: Primary | ICD-10-CM

## 2020-05-18 ENCOUNTER — TELEPHONE (OUTPATIENT)
Dept: OBSTETRICS AND GYNECOLOGY | Age: 55
End: 2020-05-18

## 2020-05-18 NOTE — TELEPHONE ENCOUNTER
----- Message from Tarun Ortiz MD sent at 5/13/2020  1:12 PM EDT -----  Please call and remind patient that the radiologist suggested an ultrasound for her dense breasts.  Of course it is up to her she wants this done but the radiologist did recommend it..

## 2020-05-18 NOTE — TELEPHONE ENCOUNTER
Called pt to remind her of suggested breast US. Pt states she plans on just having a 3D MG done from here on out as it will help so she won't have to have the US everytime.     Pt wondering who you suggest she sees as her new GYN dr. Please advise     Pt states it is okay to leave detailed VM if she does not answer.

## 2020-06-25 PROBLEM — Z12.11 SCREEN FOR COLON CANCER: Status: ACTIVE | Noted: 2020-06-25

## 2020-06-25 PROBLEM — Z80.0 FAMILY HISTORY OF COLON CANCER: Status: ACTIVE | Noted: 2020-06-25

## 2020-07-30 ENCOUNTER — TELEPHONE (OUTPATIENT)
Dept: OBSTETRICS AND GYNECOLOGY | Age: 55
End: 2020-07-30

## 2020-07-30 NOTE — TELEPHONE ENCOUNTER
(Diana pt) Pt is having urgency to urinate along with pressure. Pt is requesting a prescription for a uti. Pharmacy verified.     [phone]

## 2020-07-30 NOTE — TELEPHONE ENCOUNTER
I recommend that she drop off a urine specimen prior to treating for UTI. She is a [employer] pt now, just cal

## 2020-09-01 ENCOUNTER — TRANSCRIBE ORDERS (OUTPATIENT)
Dept: ADMINISTRATIVE | Facility: HOSPITAL | Age: 55
End: 2020-09-01

## 2020-09-01 ENCOUNTER — LAB (OUTPATIENT)
Dept: LAB | Facility: HOSPITAL | Age: 55
End: 2020-09-01

## 2020-09-01 DIAGNOSIS — E03.9 ACQUIRED HYPOTHYROIDISM: Primary | ICD-10-CM

## 2020-09-01 DIAGNOSIS — E03.9 ACQUIRED HYPOTHYROIDISM: ICD-10-CM

## 2020-09-01 LAB — TSH SERPL DL<=0.05 MIU/L-ACNC: 2.02 UIU/ML (ref 0.27–4.2)

## 2020-09-01 PROCEDURE — 36415 COLL VENOUS BLD VENIPUNCTURE: CPT

## 2020-09-01 PROCEDURE — 84443 ASSAY THYROID STIM HORMONE: CPT | Performed by: INTERNAL MEDICINE

## 2020-09-22 ENCOUNTER — TRANSCRIBE ORDERS (OUTPATIENT)
Dept: SLEEP MEDICINE | Facility: HOSPITAL | Age: 55
End: 2020-09-22

## 2020-09-22 DIAGNOSIS — Z01.818 OTHER SPECIFIED PRE-OPERATIVE EXAMINATION: Primary | ICD-10-CM

## 2020-09-25 ENCOUNTER — LAB (OUTPATIENT)
Dept: LAB | Facility: HOSPITAL | Age: 55
End: 2020-09-25

## 2020-09-25 DIAGNOSIS — Z01.818 OTHER SPECIFIED PRE-OPERATIVE EXAMINATION: ICD-10-CM

## 2020-09-25 PROCEDURE — C9803 HOPD COVID-19 SPEC COLLECT: HCPCS

## 2020-09-25 PROCEDURE — U0004 COV-19 TEST NON-CDC HGH THRU: HCPCS

## 2020-09-26 LAB — SARS-COV-2 RNA RESP QL NAA+PROBE: NOT DETECTED

## 2020-09-28 ENCOUNTER — ANESTHESIA EVENT (OUTPATIENT)
Dept: GASTROENTEROLOGY | Facility: HOSPITAL | Age: 55
End: 2020-09-28

## 2020-09-28 ENCOUNTER — HOSPITAL ENCOUNTER (OUTPATIENT)
Facility: HOSPITAL | Age: 55
Setting detail: HOSPITAL OUTPATIENT SURGERY
Discharge: HOME | End: 2020-09-28
Attending: SURGERY | Admitting: SURGERY

## 2020-09-28 ENCOUNTER — ANESTHESIA (OUTPATIENT)
Dept: GASTROENTEROLOGY | Facility: HOSPITAL | Age: 55
End: 2020-09-28

## 2020-09-28 VITALS
HEIGHT: 71 IN | RESPIRATION RATE: 18 BRPM | OXYGEN SATURATION: 99 % | DIASTOLIC BLOOD PRESSURE: 70 MMHG | BODY MASS INDEX: 25.76 KG/M2 | SYSTOLIC BLOOD PRESSURE: 111 MMHG | HEART RATE: 53 BPM | WEIGHT: 184 LBS

## 2020-09-28 DIAGNOSIS — Z12.11 SCREEN FOR COLON CANCER: ICD-10-CM

## 2020-09-28 DIAGNOSIS — Z80.0 FAMILY HISTORY OF COLON CANCER: ICD-10-CM

## 2020-09-28 PROCEDURE — 88305 TISSUE EXAM BY PATHOLOGIST: CPT | Performed by: SURGERY

## 2020-09-28 PROCEDURE — 45385 COLONOSCOPY W/LESION REMOVAL: CPT | Performed by: SURGERY

## 2020-09-28 PROCEDURE — 25010000002 PROPOFOL 10 MG/ML EMULSION: Performed by: ANESTHESIOLOGY

## 2020-09-28 PROCEDURE — 25010000002 GLUCAGON (RDNA) PER 1 MG: Performed by: SURGERY

## 2020-09-28 RX ORDER — LIDOCAINE HYDROCHLORIDE 20 MG/ML
INJECTION, SOLUTION INFILTRATION; PERINEURAL AS NEEDED
Status: DISCONTINUED | OUTPATIENT
Start: 2020-09-28 | End: 2020-09-28 | Stop reason: SURG

## 2020-09-28 RX ORDER — PROPOFOL 10 MG/ML
VIAL (ML) INTRAVENOUS CONTINUOUS PRN
Status: DISCONTINUED | OUTPATIENT
Start: 2020-09-28 | End: 2020-09-28 | Stop reason: SURG

## 2020-09-28 RX ORDER — SODIUM CHLORIDE, SODIUM LACTATE, POTASSIUM CHLORIDE, CALCIUM CHLORIDE 600; 310; 30; 20 MG/100ML; MG/100ML; MG/100ML; MG/100ML
30 INJECTION, SOLUTION INTRAVENOUS CONTINUOUS PRN
Status: DISCONTINUED | OUTPATIENT
Start: 2020-09-28 | End: 2020-09-28 | Stop reason: HOSPADM

## 2020-09-28 RX ORDER — PROPOFOL 10 MG/ML
VIAL (ML) INTRAVENOUS AS NEEDED
Status: DISCONTINUED | OUTPATIENT
Start: 2020-09-28 | End: 2020-09-28 | Stop reason: SURG

## 2020-09-28 RX ORDER — SODIUM CHLORIDE 0.9 % (FLUSH) 0.9 %
10 SYRINGE (ML) INJECTION AS NEEDED
Status: DISCONTINUED | OUTPATIENT
Start: 2020-09-28 | End: 2020-09-28 | Stop reason: HOSPADM

## 2020-09-28 RX ADMIN — PROPOFOL 100 MG: 10 INJECTION, EMULSION INTRAVENOUS at 09:38

## 2020-09-28 RX ADMIN — PROPOFOL 100 MCG/KG/MIN: 10 INJECTION, EMULSION INTRAVENOUS at 09:38

## 2020-09-28 RX ADMIN — SODIUM CHLORIDE, POTASSIUM CHLORIDE, SODIUM LACTATE AND CALCIUM CHLORIDE 30 ML/HR: 600; 310; 30; 20 INJECTION, SOLUTION INTRAVENOUS at 08:31

## 2020-09-28 RX ADMIN — LIDOCAINE HYDROCHLORIDE 60 MG: 20 INJECTION, SOLUTION INFILTRATION; PERINEURAL at 09:39

## 2020-09-28 NOTE — ANESTHESIA POSTPROCEDURE EVALUATION
"Patient: Mar Bartholomew    Procedure Summary     Date: 09/28/20 Room / Location: [facility] ENDOSCOPY 4 /  [facility]    Anesthesia Start: 0936 Anesthesia Stop: 1012    Procedure: COLONOSCOPY TO CECUM WITH COLD SNARE POLYPECTOMY (N/A ) Diagnosis:       Screen for colon cancer      Family history of colon cancer      (Screen for colon cancer [Z12.11])      (Family history of colon cancer [Z80.0])    Surgeon: Joann Scales MD Provider: Fabien Fairchild MD    Anesthesia Type: MAC ASA Status: 2          Anesthesia Type: MAC    Vitals  Vitals Value Taken Time   /70 09/28/20 1037   Temp     Pulse 53 09/28/20 1037   Resp 18 09/28/20 1037   SpO2 99 % 09/28/20 1037           Post Anesthesia Care and Evaluation    Patient location during evaluation: PACU  Patient participation: complete - patient participated  Level of consciousness: awake  Pain score: 0  Pain management: adequate  Airway patency: patent  Anesthetic complications: No anesthetic complications  PONV Status: none  Cardiovascular status: acceptable  Respiratory status: acceptable  Hydration status: acceptable    Comments: /70 (BP Location: Left arm, Patient Position: Lying)   Pulse 53   Resp 18   Ht 180.3 cm (71\")   Wt 83.5 kg (184 lb)   SpO2 99%   BMI 25.66 kg/m²       "

## 2020-09-28 NOTE — ANESTHESIA PREPROCEDURE EVALUATION
Anesthesia Evaluation     Patient summary reviewed and Nursing notes reviewed                Airway   Mallampati: I  TM distance: >3 FB  Neck ROM: full  No difficulty expected  Dental - normal exam     Pulmonary - negative pulmonary ROS and normal exam   Cardiovascular - negative cardio ROS and normal exam        Neuro/Psych- negative ROS  GI/Hepatic/Renal/Endo - negative ROS     Musculoskeletal (-) negative ROS    Abdominal  - normal exam    Bowel sounds: normal.   Substance History - negative use     OB/GYN negative ob/gyn ROS         Other                        Anesthesia Plan    ASA 2     MAC       Anesthetic plan, all risks, benefits, and alternatives have been provided, discussed and informed consent has been obtained with: patient.

## 2020-09-29 LAB
CYTO UR: NORMAL
LAB AP CASE REPORT: NORMAL
PATH REPORT.FINAL DX SPEC: NORMAL
PATH REPORT.GROSS SPEC: NORMAL

## 2020-10-01 ENCOUNTER — TELEPHONE (OUTPATIENT)
Dept: SURGERY | Facility: CLINIC | Age: 55
End: 2020-10-01

## 2020-10-01 NOTE — TELEPHONE ENCOUNTER
----- Message from Joann Scales MD sent at 9/30/2020  5:06 PM EDT -----  Daniela (endoscopy liaison),    Please call patient to inform them of these pathology findings (CAPITALIZED) and recommendations (CAPITALIZED) that have been added to my operative note.  Please ensure that any pamphlets that were to be given to the patient at the hospital were received.     Please make sure a letter is sent to the patient, recall method entered into the computer and the HIM tab updated as far as need for endoscopy follow up.    Thanks  Dr Scales    Colonoscopy Procedure Note  Mar ESCUDERO [place]  1965  Date of Procedure: 09/28/20     Pre-operative Diagnosis:    · Screening, average risk     Post-operative Diagnosis:  · Tortuous colon  · 5 mm transverse colon polyp.  Removed via snare;TUBULAR ADENOMA     Procedure: Colonoscopy with snare cautery polypectomy.                                 Recommendations:   · Colonoscopy in 5 years likely, based on pathology.  The office will call within the next  3-10 days with a final recommendation.;  YES 5 YEARS  · Keep a copy of the photographs of the procedure given to you today for possible need for reference in the future.

## 2020-10-13 ENCOUNTER — PREP FOR SURGERY (OUTPATIENT)
Dept: OTHER | Facility: HOSPITAL | Age: 55
End: 2020-10-13

## 2020-10-13 DIAGNOSIS — N81.82 INCOMPETENCE OR WEAKENING OF PUBOCERVICAL TISSUE: ICD-10-CM

## 2020-10-13 DIAGNOSIS — N36.42 URETHRAL SPHINCTER DEFICIENCY, INTRINSIC (ISD): ICD-10-CM

## 2020-10-13 DIAGNOSIS — N39.3 FEMALE STRESS INCONTINENCE: Primary | ICD-10-CM

## 2020-10-13 RX ORDER — SODIUM CHLORIDE 0.9 % (FLUSH) 0.9 %
10 SYRINGE (ML) INJECTION AS NEEDED
Status: CANCELLED | OUTPATIENT
Start: 2020-11-06

## 2020-10-13 RX ORDER — SODIUM CHLORIDE 0.9 % (FLUSH) 0.9 %
3 SYRINGE (ML) INJECTION EVERY 12 HOURS SCHEDULED
Status: CANCELLED | OUTPATIENT
Start: 2020-11-06

## 2020-10-13 RX ORDER — CEFAZOLIN SODIUM 2 G/100ML
2 INJECTION, SOLUTION INTRAVENOUS ONCE
Status: CANCELLED | OUTPATIENT
Start: 2020-11-06 | End: 2020-10-13

## 2020-10-13 RX ORDER — PHENAZOPYRIDINE HYDROCHLORIDE 200 MG/1
200 TABLET, FILM COATED ORAL ONCE
Status: CANCELLED | OUTPATIENT
Start: 2020-11-06 | End: 2020-10-13

## 2020-10-16 PROBLEM — N36.42 URETHRAL SPHINCTER DEFICIENCY, INTRINSIC (ISD): Status: ACTIVE | Noted: 2020-10-16

## 2020-10-16 PROBLEM — N81.82 INCOMPETENCE OR WEAKENING OF PUBOCERVICAL TISSUE: Status: ACTIVE | Noted: 2020-10-16

## 2020-10-19 ENCOUNTER — TRANSCRIBE ORDERS (OUTPATIENT)
Dept: PREADMISSION TESTING | Facility: HOSPITAL | Age: 55
End: 2020-10-19

## 2020-10-19 DIAGNOSIS — Z01.818 OTHER SPECIFIED PRE-OPERATIVE EXAMINATION: Primary | ICD-10-CM

## 2020-10-21 ENCOUNTER — APPOINTMENT (OUTPATIENT)
Dept: PREADMISSION TESTING | Facility: HOSPITAL | Age: 55
End: 2020-10-21

## 2020-10-21 VITALS
WEIGHT: 194.9 LBS | HEIGHT: 72 IN | SYSTOLIC BLOOD PRESSURE: 113 MMHG | TEMPERATURE: 97.7 F | BODY MASS INDEX: 26.4 KG/M2 | HEART RATE: 54 BPM | RESPIRATION RATE: 16 BRPM | OXYGEN SATURATION: 100 % | DIASTOLIC BLOOD PRESSURE: 72 MMHG

## 2020-10-21 LAB
ANION GAP SERPL CALCULATED.3IONS-SCNC: 7.7 MMOL/L (ref 5–15)
BUN SERPL-MCNC: 15 MG/DL (ref 6–20)
BUN/CREAT SERPL: 19 (ref 7–25)
CALCIUM SPEC-SCNC: 10.1 MG/DL (ref 8.6–10.5)
CHLORIDE SERPL-SCNC: 106 MMOL/L (ref 98–107)
CO2 SERPL-SCNC: 27.3 MMOL/L (ref 22–29)
CREAT SERPL-MCNC: 0.79 MG/DL (ref 0.57–1)
DEPRECATED RDW RBC AUTO: 41.9 FL (ref 37–54)
ERYTHROCYTE [DISTWIDTH] IN BLOOD BY AUTOMATED COUNT: 12.3 % (ref 12.3–15.4)
GFR SERPL CREATININE-BSD FRML MDRD: 76 ML/MIN/1.73
GLUCOSE SERPL-MCNC: 99 MG/DL (ref 65–99)
HCT VFR BLD AUTO: 39.3 % (ref 34–46.6)
HGB BLD-MCNC: 13.3 G/DL (ref 12–15.9)
MCH RBC QN AUTO: 31.3 PG (ref 26.6–33)
MCHC RBC AUTO-ENTMCNC: 33.8 G/DL (ref 31.5–35.7)
MCV RBC AUTO: 92.5 FL (ref 79–97)
PLATELET # BLD AUTO: 258 10*3/MM3 (ref 140–450)
PMV BLD AUTO: 9.6 FL (ref 6–12)
POTASSIUM SERPL-SCNC: 4.7 MMOL/L (ref 3.5–5.2)
RBC # BLD AUTO: 4.25 10*6/MM3 (ref 3.77–5.28)
SODIUM SERPL-SCNC: 141 MMOL/L (ref 136–145)
WBC # BLD AUTO: 4.12 10*3/MM3 (ref 3.4–10.8)

## 2020-10-21 PROCEDURE — 80048 BASIC METABOLIC PNL TOTAL CA: CPT | Performed by: OBSTETRICS & GYNECOLOGY

## 2020-10-21 PROCEDURE — 36415 COLL VENOUS BLD VENIPUNCTURE: CPT

## 2020-10-21 PROCEDURE — 85027 COMPLETE CBC AUTOMATED: CPT | Performed by: OBSTETRICS & GYNECOLOGY

## 2020-10-21 NOTE — DISCHARGE INSTRUCTIONS
Take the following medications the morning of surgery: SYNTHROID      If you are on prescription narcotic pain medication to control your pain you may also take that medication the morning of surgery.    General Instructions:  • Do not eat solid food after midnight the night before surgery.  • You may drink clear liquids day of surgery but must stop at least one hour before your hospital arrival time.  • It is beneficial for you to have a clear drink that contains carbohydrates the day of surgery.  We suggest a 12 to 20 ounce bottle of Gatorade or Powerade for non-diabetic patients or a 12 to 20 ounce bottle of G2 or Powerade Zero for diabetic patients.     Clear liquids are liquids you can see through.  Nothing red in color.     Plain water                               Sports drinks  Sodas                                   Gelatin (Jell-O)  Fruit juices without pulp such as white grape juice and apple juice  Popsicles that contain no fruit or yogurt  Tea or coffee (no cream or milk added)  Gatorade / Powerade  G2 / Powerade Zero      • Do not smoke, use chewing tobacco or drink alcohol the day of surgery.   • Bring any papers given to you in the doctor’s office.  • Wear clean comfortable clothes.  • Do not wear contact lenses, false eyelashes or make-up.  Bring a case for your glasses.   • Remove all piercings.  Leave jewelry and any other valuables at home.  • The Pre-Admission Testing nurse will instruct you to bring medications if unable to obtain an accurate list in Pre-Admission Testing.            Preventing a Surgical Site Infection:  • For 2 to 3 days before surgery, avoid shaving with a razor because the razor can irritate skin and make it easier to develop an infection.    • Any areas of open skin can increase the risk of a post-operative wound infection by allowing bacteria to enter and travel throughout the body.  Notify your surgeon if you have any skin wounds / rashes even if it is not near the  expected surgical site.  The area will need assessed to determine if surgery should be delayed until it is healed.  • The night prior to surgery shower using a fresh bar of anti-bacterial soap (such as Dial) and clean washcloth.  Sleep in a clean bed with clean clothing.  Do not allow pets to sleep with you.  • Shower on the morning of surgery using a fresh bar of anti-bacterial soap (such as Dial) and clean washcloth.  Dry with a clean towel and dress in clean clothing.  • Ask your surgeon if you will be receiving antibiotics prior to surgery.  • Make sure you, your family, and all healthcare providers clean their hands with soap and water or an alcohol based hand  before caring for you or your wound.    Day of surgery: 11/6/2020. MAIN OR. ARRIVAL TIME NOON  Your arrival time is approximately two hours before your scheduled surgery time.  Upon arrival, a Pre-op nurse and Anesthesiologist will review your health history, obtain vital signs, and answer questions you may have.  The only belongings needed at this time will be a list of your home medications and if applicable your C-PAP/BI-PAP machine.  If you are staying overnight your family can leave the rest of your belongings in the car and bring them to your room later.  A Pre-op nurse will start an IV and you may receive medication in preparation for surgery, including something to help you relax.  While you are in surgery your family should notify the waiting room  if they leave the waiting room area and provide a contact phone number.    Please be aware that surgery does come with discomfort.  We want to make every effort to control your discomfort so please discuss any uncontrolled symptoms with your nurse.   Your doctor will most likely have prescribed pain medications.      If you are going home after surgery you will receive individualized written care instructions before being discharged.  A responsible adult must drive you to and from  the hospital on the day of your surgery and stay with you for 24 hours.    CHLORHEXIDINE CLOTH INSTRUCTIONS  The morning of surgery follow these instructions using the Chlorhexidine cloths you've been given.  These steps reduce bacteria on the body.  Do not use the cloths near your eyes, ears mouth, genitalia or on open wounds.  Throw the cloths away after use but do not try to flush them down a toilet.      • Open and remove one cloth at a time from the package.    • Leave the cloth unfolded and begin the bathing.  • Massage the skin with the cloths using gentle pressure to remove bacteria.  Do not scrub harshly.   • Follow the steps below with one 2% CHG cloth per area (6 total cloths).  • One cloth for neck, shoulders and chest.  • One cloth for both arms, hands, fingers and underarms (do underarms last).  • One cloth for the abdomen followed by groin.  • One cloth for right leg and foot including between the toes.  • One cloth for left leg and foot including between the toes.  • The last cloth is to be used for the back of the neck, back and buttocks.    Allow the CHG to air dry 3 minutes on the skin which will give it time to work and decrease the chance of irritation.  The skin may feel sticky until it is dry.  Do not rinse with water or any other liquid or you will lose the beneficial effects of the CHG.  If mild skin irritation occurs, do rinse the skin to remove the CHG.  Report this to the nurse at time of admission.  Do not apply lotions, creams, ointments, deodorants or perfumes after using the clothes. Dress in clean clothes before coming to the hospital.    If you have any questions please call Pre-Admission Testing at [phone].  Deductibles and co-payments are collected on the day of service. Please be prepared to pay the required co-pay, deductible or deposit on the day of service as defined by your plan.    Patient Education for Self-Quarantine Process    Following your COVID testing, we  strongly recommend that you do not leave your home after you have been tested for COVID except to get medical care. This includes not going to work, school or to public areas.  If this is not possible for you to do please limit your activities to only required outings.  Be sure to wear a mask when you are with other people, practice social distancing and wash your hands frequently.      The following items provide additional details to keep you safe.  • Wash your hands with soap and water frequently for at least 20 seconds.   • Avoid touching your eyes, nose and mouth with unwashed hands.  • Do not share anything - utensils, towels, food from the same bowl.   • Have your own utensils, drinking glass, dishes, towels and bedding.   • Do not have visitors.   • Do use FaceTime to stay in touch with family and friends.  • You should stay in a specific room away from others if possible.   • Stay at least 6 feet away from others in the home if you cannot have a dedicated room to yourself.   • Do not snuggle with your pet. While the CDC says there is no evidence that pets can spread COVID-19 or be infected from humans, it is probably best to avoid “petting, snuggling, being kissed or licked and sharing food (during self-quarantine)”, according to the CDC.   • Sanitize household surfaces daily. Include all high touch areas (door handles, light switches, phones, countertops, etc.)  • Do not share a bathroom with others, if possible.   • Wear a mask around others in your home if you are unable to stay in a separate room or 6 feet apart. If  you are unable to wear a mask, have your family member wear a mask if they must be within 6 feet of you.   Call your surgeon immediately if you experience any of the following symptoms:  • Sore Throat  • Shortness of Breath or difficulty breathing  • Cough  • Chills  • Body soreness or muscle pain  • Headache  • Fever  • New loss of taste or smell  • Do not arrive for your surgery ill.   Your procedure will need to be rescheduled to another time.  You will need to call your physician before the day of surgery to avoid any unnecessary exposure to hospital staff as well as other patients.

## 2020-11-04 ENCOUNTER — LAB (OUTPATIENT)
Dept: LAB | Facility: HOSPITAL | Age: 55
End: 2020-11-04

## 2020-11-04 DIAGNOSIS — Z01.818 OTHER SPECIFIED PRE-OPERATIVE EXAMINATION: ICD-10-CM

## 2020-11-04 PROCEDURE — C9803 HOPD COVID-19 SPEC COLLECT: HCPCS

## 2020-11-04 PROCEDURE — U0004 COV-19 TEST NON-CDC HGH THRU: HCPCS | Performed by: INTERNAL MEDICINE

## 2020-11-05 LAB — SARS-COV-2 RNA RESP QL NAA+PROBE: NOT DETECTED

## 2020-11-06 ENCOUNTER — ANESTHESIA EVENT (OUTPATIENT)
Dept: PERIOP | Facility: HOSPITAL | Age: 55
End: 2020-11-06

## 2020-11-06 ENCOUNTER — HOSPITAL ENCOUNTER (OUTPATIENT)
Facility: HOSPITAL | Age: 55
Discharge: HOME | End: 2020-11-06
Attending: OBSTETRICS & GYNECOLOGY | Admitting: OBSTETRICS & GYNECOLOGY

## 2020-11-06 ENCOUNTER — ANESTHESIA (OUTPATIENT)
Dept: PERIOP | Facility: HOSPITAL | Age: 55
End: 2020-11-06

## 2020-11-06 VITALS
BODY MASS INDEX: 25.74 KG/M2 | HEART RATE: 73 BPM | DIASTOLIC BLOOD PRESSURE: 86 MMHG | RESPIRATION RATE: 16 BRPM | SYSTOLIC BLOOD PRESSURE: 123 MMHG | OXYGEN SATURATION: 98 % | TEMPERATURE: 97.4 F | HEIGHT: 72 IN | WEIGHT: 190.04 LBS

## 2020-11-06 DIAGNOSIS — N36.42 URETHRAL SPHINCTER DEFICIENCY, INTRINSIC (ISD): ICD-10-CM

## 2020-11-06 DIAGNOSIS — N81.82 INCOMPETENCE OR WEAKENING OF PUBOCERVICAL TISSUE: ICD-10-CM

## 2020-11-06 DIAGNOSIS — N39.3 FEMALE STRESS INCONTINENCE: ICD-10-CM

## 2020-11-06 LAB
B-HCG UR QL: NEGATIVE
INTERNAL NEGATIVE CONTROL: NEGATIVE
INTERNAL POSITIVE CONTROL: POSITIVE
Lab: NORMAL

## 2020-11-06 PROCEDURE — 25010000003 CEFAZOLIN IN DEXTROSE 2-4 GM/100ML-% SOLUTION: Performed by: OBSTETRICS & GYNECOLOGY

## 2020-11-06 PROCEDURE — 25010000002 PROPOFOL 10 MG/ML EMULSION: Performed by: NURSE ANESTHETIST, CERTIFIED REGISTERED

## 2020-11-06 PROCEDURE — 25010000002 ONDANSETRON PER 1 MG: Performed by: NURSE ANESTHETIST, CERTIFIED REGISTERED

## 2020-11-06 PROCEDURE — 25010000002 FENTANYL CITRATE (PF) 100 MCG/2ML SOLUTION: Performed by: NURSE ANESTHETIST, CERTIFIED REGISTERED

## 2020-11-06 PROCEDURE — 25010000002 DEXAMETHASONE PER 1 MG: Performed by: NURSE ANESTHETIST, CERTIFIED REGISTERED

## 2020-11-06 PROCEDURE — 81025 URINE PREGNANCY TEST: CPT | Performed by: OBSTETRICS & GYNECOLOGY

## 2020-11-06 PROCEDURE — 25010000002 KETOROLAC TROMETHAMINE PER 15 MG: Performed by: NURSE ANESTHETIST, CERTIFIED REGISTERED

## 2020-11-06 PROCEDURE — 25010000002 NEOSTIGMINE PER 0.5 MG: Performed by: NURSE ANESTHETIST, CERTIFIED REGISTERED

## 2020-11-06 DEVICE — GRFT TISS STRATTICE FIRM 6X10CM: Type: IMPLANTABLE DEVICE | Status: FUNCTIONAL

## 2020-11-06 RX ORDER — FENTANYL CITRATE 50 UG/ML
INJECTION, SOLUTION INTRAMUSCULAR; INTRAVENOUS AS NEEDED
Status: DISCONTINUED | OUTPATIENT
Start: 2020-11-06 | End: 2020-11-06 | Stop reason: SURG

## 2020-11-06 RX ORDER — SODIUM CHLORIDE, SODIUM LACTATE, POTASSIUM CHLORIDE, CALCIUM CHLORIDE 600; 310; 30; 20 MG/100ML; MG/100ML; MG/100ML; MG/100ML
9 INJECTION, SOLUTION INTRAVENOUS CONTINUOUS
Status: DISCONTINUED | OUTPATIENT
Start: 2020-11-06 | End: 2020-11-06 | Stop reason: HOSPADM

## 2020-11-06 RX ORDER — EPHEDRINE SULFATE 50 MG/ML
INJECTION, SOLUTION INTRAVENOUS AS NEEDED
Status: DISCONTINUED | OUTPATIENT
Start: 2020-11-06 | End: 2020-11-06 | Stop reason: SURG

## 2020-11-06 RX ORDER — NALOXONE HCL 0.4 MG/ML
0.2 VIAL (ML) INJECTION AS NEEDED
Status: DISCONTINUED | OUTPATIENT
Start: 2020-11-06 | End: 2020-11-06 | Stop reason: HOSPADM

## 2020-11-06 RX ORDER — DIPHENHYDRAMINE HCL 25 MG
25 CAPSULE ORAL
Status: DISCONTINUED | OUTPATIENT
Start: 2020-11-06 | End: 2020-11-06 | Stop reason: HOSPADM

## 2020-11-06 RX ORDER — SODIUM CHLORIDE 0.9 % (FLUSH) 0.9 %
3 SYRINGE (ML) INJECTION EVERY 12 HOURS SCHEDULED
Status: DISCONTINUED | OUTPATIENT
Start: 2020-11-06 | End: 2020-11-06 | Stop reason: HOSPADM

## 2020-11-06 RX ORDER — EPHEDRINE SULFATE 50 MG/ML
5 INJECTION, SOLUTION INTRAVENOUS ONCE AS NEEDED
Status: DISCONTINUED | OUTPATIENT
Start: 2020-11-06 | End: 2020-11-06 | Stop reason: HOSPADM

## 2020-11-06 RX ORDER — ACETAMINOPHEN 500 MG
1000 TABLET ORAL ONCE
Status: COMPLETED | OUTPATIENT
Start: 2020-11-06 | End: 2020-11-06

## 2020-11-06 RX ORDER — HYDROCODONE BITARTRATE AND ACETAMINOPHEN 7.5; 325 MG/1; MG/1
1 TABLET ORAL ONCE AS NEEDED
Status: COMPLETED | OUTPATIENT
Start: 2020-11-06 | End: 2020-11-06

## 2020-11-06 RX ORDER — SODIUM CHLORIDE 9 MG/ML
INJECTION, SOLUTION INTRAVENOUS AS NEEDED
Status: DISCONTINUED | OUTPATIENT
Start: 2020-11-06 | End: 2020-11-06 | Stop reason: HOSPADM

## 2020-11-06 RX ORDER — FAMOTIDINE 10 MG/ML
20 INJECTION, SOLUTION INTRAVENOUS ONCE
Status: COMPLETED | OUTPATIENT
Start: 2020-11-06 | End: 2020-11-06

## 2020-11-06 RX ORDER — FLUMAZENIL 0.1 MG/ML
0.2 INJECTION INTRAVENOUS AS NEEDED
Status: DISCONTINUED | OUTPATIENT
Start: 2020-11-06 | End: 2020-11-06 | Stop reason: HOSPADM

## 2020-11-06 RX ORDER — ONDANSETRON 2 MG/ML
4 INJECTION INTRAMUSCULAR; INTRAVENOUS ONCE AS NEEDED
Status: DISCONTINUED | OUTPATIENT
Start: 2020-11-06 | End: 2020-11-06 | Stop reason: HOSPADM

## 2020-11-06 RX ORDER — FENTANYL CITRATE 50 UG/ML
50 INJECTION, SOLUTION INTRAMUSCULAR; INTRAVENOUS
Status: DISCONTINUED | OUTPATIENT
Start: 2020-11-06 | End: 2020-11-06 | Stop reason: HOSPADM

## 2020-11-06 RX ORDER — KETOROLAC TROMETHAMINE 30 MG/ML
INJECTION, SOLUTION INTRAMUSCULAR; INTRAVENOUS AS NEEDED
Status: DISCONTINUED | OUTPATIENT
Start: 2020-11-06 | End: 2020-11-06 | Stop reason: SURG

## 2020-11-06 RX ORDER — ROCURONIUM BROMIDE 10 MG/ML
INJECTION, SOLUTION INTRAVENOUS AS NEEDED
Status: DISCONTINUED | OUTPATIENT
Start: 2020-11-06 | End: 2020-11-06 | Stop reason: SURG

## 2020-11-06 RX ORDER — HYDROMORPHONE HYDROCHLORIDE 1 MG/ML
0.5 INJECTION, SOLUTION INTRAMUSCULAR; INTRAVENOUS; SUBCUTANEOUS
Status: DISCONTINUED | OUTPATIENT
Start: 2020-11-06 | End: 2020-11-06 | Stop reason: HOSPADM

## 2020-11-06 RX ORDER — MAGNESIUM HYDROXIDE 1200 MG/15ML
LIQUID ORAL AS NEEDED
Status: DISCONTINUED | OUTPATIENT
Start: 2020-11-06 | End: 2020-11-06 | Stop reason: HOSPADM

## 2020-11-06 RX ORDER — DEXAMETHASONE SODIUM PHOSPHATE 10 MG/ML
INJECTION INTRAMUSCULAR; INTRAVENOUS AS NEEDED
Status: DISCONTINUED | OUTPATIENT
Start: 2020-11-06 | End: 2020-11-06 | Stop reason: SURG

## 2020-11-06 RX ORDER — SODIUM CHLORIDE 0.9 % (FLUSH) 0.9 %
3-10 SYRINGE (ML) INJECTION AS NEEDED
Status: DISCONTINUED | OUTPATIENT
Start: 2020-11-06 | End: 2020-11-06 | Stop reason: HOSPADM

## 2020-11-06 RX ORDER — ONDANSETRON 2 MG/ML
INJECTION INTRAMUSCULAR; INTRAVENOUS AS NEEDED
Status: DISCONTINUED | OUTPATIENT
Start: 2020-11-06 | End: 2020-11-06 | Stop reason: SURG

## 2020-11-06 RX ORDER — LIDOCAINE HYDROCHLORIDE 20 MG/ML
INJECTION, SOLUTION INFILTRATION; PERINEURAL AS NEEDED
Status: DISCONTINUED | OUTPATIENT
Start: 2020-11-06 | End: 2020-11-06 | Stop reason: SURG

## 2020-11-06 RX ORDER — GLYCOPYRROLATE 0.2 MG/ML
INJECTION INTRAMUSCULAR; INTRAVENOUS AS NEEDED
Status: DISCONTINUED | OUTPATIENT
Start: 2020-11-06 | End: 2020-11-06 | Stop reason: SURG

## 2020-11-06 RX ORDER — LABETALOL HYDROCHLORIDE 5 MG/ML
5 INJECTION, SOLUTION INTRAVENOUS
Status: DISCONTINUED | OUTPATIENT
Start: 2020-11-06 | End: 2020-11-06 | Stop reason: HOSPADM

## 2020-11-06 RX ORDER — SODIUM CHLORIDE 0.9 % (FLUSH) 0.9 %
10 SYRINGE (ML) INJECTION AS NEEDED
Status: DISCONTINUED | OUTPATIENT
Start: 2020-11-06 | End: 2020-11-06 | Stop reason: HOSPADM

## 2020-11-06 RX ORDER — CEFAZOLIN SODIUM 2 G/100ML
2 INJECTION, SOLUTION INTRAVENOUS ONCE
Status: COMPLETED | OUTPATIENT
Start: 2020-11-06 | End: 2020-11-06

## 2020-11-06 RX ORDER — DIPHENHYDRAMINE HYDROCHLORIDE 50 MG/ML
12.5 INJECTION INTRAMUSCULAR; INTRAVENOUS
Status: DISCONTINUED | OUTPATIENT
Start: 2020-11-06 | End: 2020-11-06 | Stop reason: HOSPADM

## 2020-11-06 RX ORDER — MIDAZOLAM HYDROCHLORIDE 1 MG/ML
1 INJECTION INTRAMUSCULAR; INTRAVENOUS
Status: DISCONTINUED | OUTPATIENT
Start: 2020-11-06 | End: 2020-11-06 | Stop reason: HOSPADM

## 2020-11-06 RX ORDER — BUPIVACAINE HYDROCHLORIDE AND EPINEPHRINE 2.5; 5 MG/ML; UG/ML
INJECTION, SOLUTION EPIDURAL; INFILTRATION; INTRACAUDAL; PERINEURAL AS NEEDED
Status: DISCONTINUED | OUTPATIENT
Start: 2020-11-06 | End: 2020-11-06 | Stop reason: HOSPADM

## 2020-11-06 RX ORDER — PROMETHAZINE HYDROCHLORIDE 25 MG/1
25 TABLET ORAL ONCE AS NEEDED
Status: DISCONTINUED | OUTPATIENT
Start: 2020-11-06 | End: 2020-11-06 | Stop reason: HOSPADM

## 2020-11-06 RX ORDER — OXYCODONE AND ACETAMINOPHEN 7.5; 325 MG/1; MG/1
1 TABLET ORAL ONCE AS NEEDED
Status: DISCONTINUED | OUTPATIENT
Start: 2020-11-06 | End: 2020-11-06 | Stop reason: HOSPADM

## 2020-11-06 RX ORDER — PROPOFOL 10 MG/ML
VIAL (ML) INTRAVENOUS AS NEEDED
Status: DISCONTINUED | OUTPATIENT
Start: 2020-11-06 | End: 2020-11-06 | Stop reason: SURG

## 2020-11-06 RX ORDER — PHENAZOPYRIDINE HYDROCHLORIDE 200 MG/1
200 TABLET, FILM COATED ORAL ONCE
Status: COMPLETED | OUTPATIENT
Start: 2020-11-06 | End: 2020-11-06

## 2020-11-06 RX ORDER — PROMETHAZINE HYDROCHLORIDE 25 MG/1
25 SUPPOSITORY RECTAL ONCE AS NEEDED
Status: DISCONTINUED | OUTPATIENT
Start: 2020-11-06 | End: 2020-11-06 | Stop reason: HOSPADM

## 2020-11-06 RX ADMIN — DEXAMETHASONE SODIUM PHOSPHATE 8 MG: 10 INJECTION INTRAMUSCULAR; INTRAVENOUS at 16:16

## 2020-11-06 RX ADMIN — FAMOTIDINE 20 MG: 10 INJECTION INTRAVENOUS at 14:06

## 2020-11-06 RX ADMIN — PHENAZOPYRIDINE 200 MG: 200 TABLET ORAL at 13:18

## 2020-11-06 RX ADMIN — FENTANYL CITRATE 50 MCG: 50 INJECTION, SOLUTION INTRAMUSCULAR; INTRAVENOUS at 17:47

## 2020-11-06 RX ADMIN — SODIUM CHLORIDE, POTASSIUM CHLORIDE, SODIUM LACTATE AND CALCIUM CHLORIDE: 600; 310; 30; 20 INJECTION, SOLUTION INTRAVENOUS at 16:36

## 2020-11-06 RX ADMIN — PROPOFOL 50 MG: 10 INJECTION, EMULSION INTRAVENOUS at 16:51

## 2020-11-06 RX ADMIN — SODIUM CHLORIDE, POTASSIUM CHLORIDE, SODIUM LACTATE AND CALCIUM CHLORIDE 9 ML/HR: 600; 310; 30; 20 INJECTION, SOLUTION INTRAVENOUS at 13:12

## 2020-11-06 RX ADMIN — NEOSTIGMINE METHYLSULFATE 2 MG: 1 INJECTION INTRAMUSCULAR; INTRAVENOUS; SUBCUTANEOUS at 17:09

## 2020-11-06 RX ADMIN — EPHEDRINE SULFATE 10 MG: 50 INJECTION INTRAVENOUS at 16:35

## 2020-11-06 RX ADMIN — ROCURONIUM BROMIDE 10 MG: 10 INJECTION INTRAVENOUS at 16:51

## 2020-11-06 RX ADMIN — GLYCOPYRROLATE 0.3 MG: 0.2 INJECTION INTRAMUSCULAR; INTRAVENOUS at 16:17

## 2020-11-06 RX ADMIN — KETOROLAC TROMETHAMINE 30 MG: 30 INJECTION, SOLUTION INTRAMUSCULAR; INTRAVENOUS at 17:06

## 2020-11-06 RX ADMIN — ROCURONIUM BROMIDE 40 MG: 10 INJECTION INTRAVENOUS at 16:03

## 2020-11-06 RX ADMIN — ACETAMINOPHEN 1000 MG: 500 TABLET, FILM COATED ORAL at 14:05

## 2020-11-06 RX ADMIN — EPHEDRINE SULFATE 10 MG: 50 INJECTION INTRAVENOUS at 16:46

## 2020-11-06 RX ADMIN — FENTANYL CITRATE 100 MCG: 50 INJECTION INTRAMUSCULAR; INTRAVENOUS at 16:01

## 2020-11-06 RX ADMIN — ONDANSETRON HYDROCHLORIDE 4 MG: 2 SOLUTION INTRAMUSCULAR; INTRAVENOUS at 17:06

## 2020-11-06 RX ADMIN — HYDROCODONE BITARTRATE AND ACETAMINOPHEN 1 TABLET: 7.5; 325 TABLET ORAL at 17:47

## 2020-11-06 RX ADMIN — GLYCOPYRROLATE 0.4 MG: 0.2 INJECTION INTRAMUSCULAR; INTRAVENOUS at 17:09

## 2020-11-06 RX ADMIN — CEFAZOLIN SODIUM 2 G: 2 INJECTION, SOLUTION INTRAVENOUS at 16:09

## 2020-11-06 RX ADMIN — LIDOCAINE HYDROCHLORIDE 100 MG: 20 INJECTION, SOLUTION INFILTRATION; PERINEURAL at 16:01

## 2020-11-06 RX ADMIN — PROPOFOL 150 MG: 10 INJECTION, EMULSION INTRAVENOUS at 16:03

## 2020-11-06 NOTE — ANESTHESIA PREPROCEDURE EVALUATION
Anesthesia Evaluation     no history of anesthetic complications:  NPO Solid Status: > 8 hours  NPO Liquid Status: > 2 hours           Airway   Mallampati: I  Neck ROM: full  no difficulty expected  Dental - normal exam     Pulmonary - negative pulmonary ROS and normal exam   (-) COPD, asthma, sleep apnea, not a smoker    PE comment: nonlabored  Cardiovascular - negative cardio ROS and normal exam    Rhythm: regular  Rate: normal    (-) hypertension, valvular problems/murmurs, past MI, CAD, dysrhythmias, angina      Neuro/Psych- negative ROS  (-) seizures, TIA, CVA  GI/Hepatic/Renal/Endo    (+)   thyroid problem hypothyroidism  (-) GERD, liver disease, no renal disease, diabetes    Musculoskeletal (-) negative ROS    Abdominal    Substance History      OB/GYN          Other                        Anesthesia Plan    ASA 2     general     intravenous induction     Anesthetic plan, all risks, benefits, and alternatives have been provided, discussed and informed consent has been obtained with: patient.

## 2020-11-06 NOTE — ANESTHESIA POSTPROCEDURE EVALUATION
"Patient: Mar Bartholomew    Procedure Summary     Date: 11/06/20 Room / Location: [facility] OR [address] [place] [address] MAIN OR    Anesthesia Start: 1557 Anesthesia Stop: 1732    Procedure: Pubovaginal sling insertion of vaginal graft cystourethroscopy (N/A Vagina) Diagnosis:       Female stress incontinence      Urethral sphincter deficiency, intrinsic (ISD)      Incompetence or weakening of pubocervical tissue      (Female stress incontinence [N39.3])      (Urethral sphincter deficiency, intrinsic (ISD) [N36.42])      (Incompetence or weakening of pubocervical tissue [N81.82])    Surgeon: Daniela Wu MD Provider: Kirt Jhaveri MD    Anesthesia Type: general ASA Status: 2          Anesthesia Type: general    Vitals  Vitals Value Taken Time   /72 11/06/20 1800   Temp 36.3 °C (97.4 °F) 11/06/20 1730   Pulse 70 11/06/20 1801   Resp 16 11/06/20 1745   SpO2 97 % 11/06/20 1803   Vitals shown include unvalidated device data.        Post Anesthesia Care and Evaluation    Patient location during evaluation: bedside  Patient participation: complete - patient participated  Level of consciousness: awake and alert  Pain management: adequate  Airway patency: patent  Anesthetic complications: No anesthetic complications    Cardiovascular status: acceptable  Respiratory status: acceptable  Hydration status: acceptable    Comments: /78   Pulse 73   Temp 36.3 °C (97.4 °F) (Oral)   Resp 16   Ht 182.9 cm (72\")   Wt 86.2 kg (190 lb 0.6 oz)   LMP  (LMP Unknown)   SpO2 98%   BMI 25.77 kg/m²       "

## 2020-11-06 NOTE — PERIOPERATIVE NURSING NOTE
Pt voided 250 cc quickly and without difficulty;  Bladder scanned immediately after return to room; bladder scan revealed 0 ml remaining (scanned multiple times with same result of 0).

## 2020-11-06 NOTE — ANESTHESIA PROCEDURE NOTES
Airway  Urgency: elective    Date/Time: 11/6/2020 4:07 PM  Airway not difficult    General Information and Staff    Patient location during procedure: OR  Anesthesiologist: Kirt Jhaveri MD  CRNA: Bo Delgado CRNA    Indications and Patient Condition  Indications for airway management: airway protection    Preoxygenated: yes  Mask difficulty assessment: 1 - vent by mask    Final Airway Details  Final airway type: endotracheal airway      Successful airway: ETT  Cuffed: yes   Successful intubation technique: direct laryngoscopy  Endotracheal tube insertion site: oral  Blade: Nazario  Blade size: 2  ETT size (mm): 7.0  Cormack-Lehane Classification: grade I - full view of glottis  Placement verified by: chest auscultation and capnometry   Measured from: lips  ETT/EBT  to lips (cm): 22  Number of attempts at approach: 1  Assessment: lips, teeth, and gum same as pre-op and atraumatic intubation    Additional Comments  Pre 02 100%, SIVI, DL x1, atraumatic intubation, BLBS, Positive ETC02.

## 2020-11-06 NOTE — H&P
Female Pelvic Medicine and Reconstructive Surgery   History & Physical    Patient Identification:  Name: Mar Bartholomew Age: 55 y.o. Sex: female :  1965 MRN: Female Pelvic Medicine and Reconstructive Surgery   History & Physical    Patient Identification:  Name: Mar Bartholomew Age: 55 y.o. Sex: female :  1965 MRN: [id number]                            Problem List:    Female stress incontinence    Urethral sphincter deficiency, intrinsic (ISD)    Incompetence or weakening of pubocervical tissue    Past Medical History:  Past Medical History:   Diagnosis Date   • Disease of thyroid gland     HYPOTHRYODISM   • History of colon polyps    • OAB (overactive bladder)    • PAIGE (stress urinary incontinence, female)      Past Surgical History:  Past Surgical History:   Procedure Laterality Date   • BREAST BIOPSY     • COLONOSCOPY     • COLONOSCOPY N/A 2020    Procedure: COLONOSCOPY TO CECUM WITH COLD SNARE POLYPECTOMY;  Surgeon: Joann Scales MD;  Location: [facility] ENDOSCOPY;  Service: General;  Laterality: N/A;  SCREENING  --POLYP   • HYSTEROSCOPY ENDOMETRIAL ABLATION        Home Meds:  Medications Prior to Admission   Medication Sig Dispense Refill Last Dose   • CHLORHEXIDINE GLUCONATE CLOTH EX Apply  topically. AS DIRECTED   2020 at 1030   • levothyroxine (SYNTHROID, LEVOTHROID) 100 MCG tablet Take 100 mcg by mouth Daily.   2020 at 0800   • Multiple Vitamins-Minerals (EMERGEN-C IMMUNE PO) Take 1 Units by mouth Daily.   2020 at Unknown time     Current Meds:     Current Facility-Administered Medications:   •  ceFAZolin in dextrose (ANCEF) IVPB solution 2 g, 2 g, Intravenous, Once, Daniela Wu MD  •  lactated ringers infusion, 9 mL/hr, Intravenous, Continuous, Bo Garcia MD, Last Rate: 9 mL/hr at 20 1312, 9 mL/hr at 20 1312  •  midazolam (VERSED) injection 1 mg, 1 mg, Intravenous, Q10 Min PRN, Bo Garcia MD  •  sodium chloride 0.9 %  flush 10 mL, 10 mL, Intravenous, PRN, Daniela Wu MD  •  sodium chloride 0.9 % flush 3 mL, 3 mL, Intravenous, Q12H, Daniela Wu MD  •  sodium chloride 0.9 % flush 3 mL, 3 mL, Intravenous, Q12H, Bo Garcia MD  •  sodium chloride 0.9 % flush 3-10 mL, 3-10 mL, Intravenous, PRN, Bo Garcia MD  Allergies:  No Known Allergies  Immunizations:    There is no immunization history on file for this patient.  Social History:   Social History     Tobacco Use   • Smoking status: Never Smoker   • Smokeless tobacco: Never Used   Substance Use Topics   • Alcohol use: Yes     Comment: socially       Family History:  Family History   Problem Relation Age of Onset   • Breast cancer Mother    • No Known Problems Father    • No Known Problems Sister    • No Known Problems Brother    • No Known Problems Daughter    • No Known Problems Son    • No Known Problems Maternal Grandmother    • No Known Problems Paternal Grandmother    • No Known Problems Maternal Aunt    • No Known Problems Paternal Aunt    • BRCA 1/2 Neg Hx    • Colon cancer Neg Hx    • Endometrial cancer Neg Hx    • Ovarian cancer Neg Hx         Review of Systems  Constitutional:  Denies fever or chills   Eyes:  Denies change in visual acuity   HEENT:  Denies nasal congestion or sore throat   Respiratory:  Denies cough or shortness of breath   Cardiovascular:  Denies chest pain or edema   GI:  Denies abdominal pain, nausea, vomiting, bloody stools or diarrhea   :  Denies dysuria   Musculoskeletal:  Denies back pain or joint pain   Integument:  Denies rash   Neurologic:  Denies headache, focal weakness or sensory changes   Endocrine:  Denies polyuria or polydipsia   Lymphatic:  Denies swollen glands   Psychiatric:  Denies depression or anxiety       Objective:  tMax 24 hrs: Temp (24hrs), Av.4 °F (36.9 °C), Min:98.4 °F (36.9 °C), Max:98.4 °F (36.9 °C)    Vitals Ranges:   Temp:  [98.4 °F (36.9 °C)] 98.4 °F (36.9 °C)  Heart Rate:  [52]  "52  Resp:  [20] 20  BP: (116)/(75) 116/75    Exam:  Vital Signs: /75 (BP Location: Right arm, Patient Position: Lying)   Pulse 52   Temp 98.4 °F (36.9 °C) (Oral)   Resp 20   Ht 182.9 cm (72\")   Wt 86.2 kg (190 lb 0.6 oz)   LMP  (LMP Unknown)   SpO2 98%   BMI 25.77 kg/m²   Constitutional:  Well developed, well nourished, no acute distress, non-toxic appearance    GI:  Soft, nondistended, normal bowel sounds, nontender, no organomegaly, no mass, no rebound, no guarding   :  No costovertebral angle tenderness   Musculoskeletal:  No edema, no tenderness, no deformities. Back- no tenderness  Integument:  Well hydrated, no rash   Lymphatic:  No lymphadenopathy noted   Neurologic:  Alert & oriented x 3,  Pelvic:     POPQ  -1  -1  -5  5  3  10  -1  -1  -7     LPP 51 cm H2O  PFS 27ml/s  V 369  PVR 5        Assessment:    Female stress incontinence    Urethral sphincter deficiency, intrinsic (ISD)    Incompetence or weakening of pubocervical tissue        Plan:  Pubovaginal sling   insertion of vaginal graft   cystourethroscopy    Daniela Wu MD  11/6/2020    "

## 2020-11-18 NOTE — OP NOTE
[facility] MAIN OR     PATIENT NAME:  Mar Bartholomew              : 1965     DATE OF OPERATION:  2020    Pre-operative Diagnosis :  1. Urinary, incontinence, stress female N39.3  2. Intrinsic sphincter deficiency, N36.42       Post-operative Diagnosis: same     Surgeon:  Daniela Wu MD       Name of Operation/Procedure:   1. Pubovaginal sling, retropubic, porcine, 58313  2. Cystourethroscopy     Findings: On cystourethroscopy, following all the procedures, there was bilateral efflux of Pyridium-stained urine from both the right and the left ureteral orifices. There were no lesions or foreign material of the bladder or the urethra.      Description of procedure: The patient was taken to the Operating Room with a peripheral IV in place. She underwent the induction of general endotracheal anesthesia, was prepped and draped in the dorsal lithotomy position in [facility]. Cystourethroscopy showed no lesions or foreign material of the bladder or the urethra. There is bilateral efflux of urine from both the right and the left ureteral orifices. The cystoscope was removed and a Smith was placed and set to straight drainage. A sub-urethral incision was made and dissected bilaterally. A sheet of Strattice porcine graft was made to form a sling. The sling made of porcine biologic material is superior and safer than synthetic mesh representing standard of care. The retropubic 1 centimeter porcine sling was placed with transvaginal retropubic introducers.  Cystourethroscopy with each transvaginal introducer in place showed no lesions or foreign material of the bladder or the urethra. The pubovaginal sling was adjusted without tension so that a curved Barth easily fit between the sub-urethral tissue and the tape. The excess suprapubic graft ends were trimmed and the skin lifted away. These incisions were closed with 4- 0 Vicryl suture. With a Smith catheter in place the sub-urethral incision  was closed with 2-0 Vicryl and was hemostatic. The catheter was removed and the patient was taken out of the dorsal lithotomy position, awakened from general anesthesia, and taken to the Recovery Room in good condition. There were no complications to the procedures. All final needle, sponge, and instrument counts were reported as correct.        ESTIMATED BLOOD LOSS: 150 milliliters          FLUIDS: 1300 milliliters crystalloid     URINE OUTPUT: 100 milliliters        Daniela Wu MD, MSc, FACOG, FACS

## 2020-11-18 NOTE — H&P
[facility] MAIN OR     PATIENT NAME:  Mar Bartholomew              : 1965     DATE OF OPERATION:  2020    Pre-operative Diagnosis :  1. Urinary, incontinence, stress female N39.3  2. Intrinsic sphincter deficiency, N36.42       Post-operative Diagnosis: same     Surgeon:  Daniela Wu MD       Name of Operation/Procedure:   1. Pubovaginal sling, retropubic, porcine, 35248  2. Cystourethroscopy     Findings: On cystourethroscopy, following all the procedures, there was bilateral efflux of Pyridium-stained urine from both the right and the left ureteral orifices. There were no lesions or foreign material of the bladder or the urethra.      Description of procedure: The patient was taken to the Operating Room with a peripheral IV in place. She underwent the induction of general endotracheal anesthesia, was prepped and draped in the dorsal lithotomy position in [facility]. Cystourethroscopy showed no lesions or foreign material of the bladder or the urethra. There is bilateral efflux of urine from both the right and the left ureteral orifices. The cystoscope was removed and a Smith was placed and set to straight drainage. A sub-urethral incision was made and dissected bilaterally. A sheet of Strattice porcine graft was made to form a sling. The sling made of porcine biologic material is superior and safer than synthetic mesh representing standard of care. The retropubic 1 centimeter porcine sling was placed with transvaginal retropubic introducers.  Cystourethroscopy with each transvaginal introducer in place showed no lesions or foreign material of the bladder or the urethra. The pubovaginal sling was adjusted without tension so that a curved Barth easily fit between the sub-urethral tissue and the tape. The excess suprapubic graft ends were trimmed and the skin lifted away. These incisions were closed with 4- 0 Vicryl suture. With a Smith catheter in place the sub-urethral incision was  closed with 2-0 Vicryl and was hemostatic. The catheter was removed and the patient was taken out of the dorsal lithotomy position, awakened from general anesthesia, and taken to the Recovery Room in good condition. There were no complications to the procedures. All final needle, sponge, and instrument counts were reported as correct.        ESTIMATED BLOOD LOSS: 150 milliliters          FLUIDS: 1300 milliliters crystalloid     URINE OUTPUT: 100 milliliters        Daniela Wu MD, MSc, FACOG, FACS

## 2021-02-19 ENCOUNTER — TRANSCRIBE ORDERS (OUTPATIENT)
Dept: ADMINISTRATIVE | Facility: HOSPITAL | Age: 56
End: 2021-02-19

## 2021-02-19 DIAGNOSIS — Z12.31 VISIT FOR SCREENING MAMMOGRAM: Primary | ICD-10-CM

## 2021-02-26 ENCOUNTER — LAB (OUTPATIENT)
Dept: LAB | Facility: HOSPITAL | Age: 56
End: 2021-02-26

## 2021-02-26 ENCOUNTER — TRANSCRIBE ORDERS (OUTPATIENT)
Dept: ADMINISTRATIVE | Facility: HOSPITAL | Age: 56
End: 2021-02-26

## 2021-02-26 DIAGNOSIS — E03.9 ACQUIRED HYPOTHYROIDISM: ICD-10-CM

## 2021-02-26 DIAGNOSIS — Z00.00 ROUTINE GENERAL MEDICAL EXAMINATION AT A HEALTH CARE FACILITY: Primary | ICD-10-CM

## 2021-02-26 DIAGNOSIS — Z00.00 ROUTINE GENERAL MEDICAL EXAMINATION AT A HEALTH CARE FACILITY: ICD-10-CM

## 2021-02-26 LAB
ALBUMIN SERPL-MCNC: 4.5 G/DL (ref 3.5–5.2)
ALBUMIN/GLOB SERPL: 2 G/DL
ALP SERPL-CCNC: 66 U/L (ref 39–117)
ALT SERPL W P-5'-P-CCNC: 12 U/L (ref 1–33)
ANION GAP SERPL CALCULATED.3IONS-SCNC: 9.6 MMOL/L (ref 5–15)
AST SERPL-CCNC: 17 U/L (ref 1–32)
BASOPHILS # BLD AUTO: 0.04 10*3/MM3 (ref 0–0.2)
BASOPHILS NFR BLD AUTO: 1 % (ref 0–1.5)
BILIRUB SERPL-MCNC: 0.4 MG/DL (ref 0–1.2)
BILIRUB UR QL STRIP: NEGATIVE
BUN SERPL-MCNC: 19 MG/DL (ref 6–20)
BUN/CREAT SERPL: 25.7 (ref 7–25)
CALCIUM SPEC-SCNC: 9.5 MG/DL (ref 8.6–10.5)
CHLORIDE SERPL-SCNC: 109 MMOL/L (ref 98–107)
CHOLEST SERPL-MCNC: 190 MG/DL (ref 0–200)
CLARITY UR: CLEAR
CO2 SERPL-SCNC: 24.4 MMOL/L (ref 22–29)
COLOR UR: YELLOW
CREAT SERPL-MCNC: 0.74 MG/DL (ref 0.57–1)
DEPRECATED RDW RBC AUTO: 43.2 FL (ref 37–54)
EOSINOPHIL # BLD AUTO: 0.08 10*3/MM3 (ref 0–0.4)
EOSINOPHIL NFR BLD AUTO: 2 % (ref 0.3–6.2)
ERYTHROCYTE [DISTWIDTH] IN BLOOD BY AUTOMATED COUNT: 12.8 % (ref 12.3–15.4)
GFR SERPL CREATININE-BSD FRML MDRD: 81 ML/MIN/1.73
GLOBULIN UR ELPH-MCNC: 2.2 GM/DL
GLUCOSE SERPL-MCNC: 100 MG/DL (ref 65–99)
GLUCOSE UR STRIP-MCNC: NEGATIVE MG/DL
HCT VFR BLD AUTO: 38.5 % (ref 34–46.6)
HDLC SERPL-MCNC: 89 MG/DL (ref 40–60)
HGB BLD-MCNC: 12.7 G/DL (ref 12–15.9)
HGB UR QL STRIP.AUTO: NEGATIVE
IMM GRANULOCYTES # BLD AUTO: 0 10*3/MM3 (ref 0–0.05)
IMM GRANULOCYTES NFR BLD AUTO: 0 % (ref 0–0.5)
KETONES UR QL STRIP: NEGATIVE
LDLC SERPL CALC-MCNC: 91 MG/DL (ref 0–100)
LDLC/HDLC SERPL: 1.02 {RATIO}
LEUKOCYTE ESTERASE UR QL STRIP.AUTO: NEGATIVE
LYMPHOCYTES # BLD AUTO: 1.75 10*3/MM3 (ref 0.7–3.1)
LYMPHOCYTES NFR BLD AUTO: 44.8 % (ref 19.6–45.3)
MCH RBC QN AUTO: 30.5 PG (ref 26.6–33)
MCHC RBC AUTO-ENTMCNC: 33 G/DL (ref 31.5–35.7)
MCV RBC AUTO: 92.3 FL (ref 79–97)
MONOCYTES # BLD AUTO: 0.28 10*3/MM3 (ref 0.1–0.9)
MONOCYTES NFR BLD AUTO: 7.2 % (ref 5–12)
NEUTROPHILS NFR BLD AUTO: 1.76 10*3/MM3 (ref 1.7–7)
NEUTROPHILS NFR BLD AUTO: 45 % (ref 42.7–76)
NITRITE UR QL STRIP: NEGATIVE
NRBC BLD AUTO-RTO: 0 /100 WBC (ref 0–0.2)
PH UR STRIP.AUTO: 5.5 [PH] (ref 5–8)
PLATELET # BLD AUTO: 254 10*3/MM3 (ref 140–450)
PMV BLD AUTO: 10.1 FL (ref 6–12)
POTASSIUM SERPL-SCNC: 4.3 MMOL/L (ref 3.5–5.2)
PROT SERPL-MCNC: 6.7 G/DL (ref 6–8.5)
PROT UR QL STRIP: NEGATIVE
RBC # BLD AUTO: 4.17 10*6/MM3 (ref 3.77–5.28)
SODIUM SERPL-SCNC: 143 MMOL/L (ref 136–145)
SP GR UR STRIP: 1.03 (ref 1–1.03)
TRIGL SERPL-MCNC: 53 MG/DL (ref 0–150)
TSH SERPL DL<=0.05 MIU/L-ACNC: 1.46 UIU/ML (ref 0.27–4.2)
UROBILINOGEN UR QL STRIP: NORMAL
VLDLC SERPL-MCNC: 10 MG/DL (ref 5–40)
WBC # BLD AUTO: 3.91 10*3/MM3 (ref 3.4–10.8)

## 2021-02-26 PROCEDURE — 81003 URINALYSIS AUTO W/O SCOPE: CPT

## 2021-02-26 PROCEDURE — 84443 ASSAY THYROID STIM HORMONE: CPT

## 2021-02-26 PROCEDURE — 80061 LIPID PANEL: CPT

## 2021-02-26 PROCEDURE — 36415 COLL VENOUS BLD VENIPUNCTURE: CPT

## 2021-02-26 PROCEDURE — 85025 COMPLETE CBC W/AUTO DIFF WBC: CPT

## 2021-02-26 PROCEDURE — 80053 COMPREHEN METABOLIC PANEL: CPT

## 2021-03-02 ENCOUNTER — HOSPITAL ENCOUNTER (OUTPATIENT)
Dept: MAMMOGRAPHY | Facility: HOSPITAL | Age: 56
Discharge: HOME | End: 2021-03-02
Admitting: OBSTETRICS & GYNECOLOGY

## 2021-03-02 DIAGNOSIS — Z12.31 VISIT FOR SCREENING MAMMOGRAM: ICD-10-CM

## 2021-03-02 PROCEDURE — 77063 BREAST TOMOSYNTHESIS BI: CPT

## 2021-03-02 PROCEDURE — 77067 SCR MAMMO BI INCL CAD: CPT

## 2021-03-26 ENCOUNTER — BULK ORDERING (OUTPATIENT)
Dept: CASE MANAGEMENT | Facility: OTHER | Age: 56
End: 2021-03-26

## 2021-03-26 DIAGNOSIS — Z23 IMMUNIZATION DUE: ICD-10-CM

## 2021-08-09 ENCOUNTER — OFFICE VISIT (OUTPATIENT)
Dept: OBSTETRICS AND GYNECOLOGY | Age: 56
End: 2021-08-09

## 2021-08-09 VITALS
SYSTOLIC BLOOD PRESSURE: 126 MMHG | WEIGHT: 190 LBS | BODY MASS INDEX: 25.73 KG/M2 | HEIGHT: 72 IN | DIASTOLIC BLOOD PRESSURE: 80 MMHG

## 2021-08-09 DIAGNOSIS — Z13.89 SCREENING FOR BLOOD OR PROTEIN IN URINE: ICD-10-CM

## 2021-08-09 DIAGNOSIS — N95.1 MENOPAUSAL SYMPTOMS: ICD-10-CM

## 2021-08-09 DIAGNOSIS — Z98.890 HISTORY OF ENDOMETRIAL ABLATION: ICD-10-CM

## 2021-08-09 DIAGNOSIS — N81.6 RECTOCELE: ICD-10-CM

## 2021-08-09 DIAGNOSIS — N95.8 GENITOURINARY SYNDROME OF MENOPAUSE: ICD-10-CM

## 2021-08-09 DIAGNOSIS — Z12.4 SCREENING FOR MALIGNANT NEOPLASM OF THE CERVIX: ICD-10-CM

## 2021-08-09 DIAGNOSIS — Z01.419 ENCOUNTER FOR GYNECOLOGICAL EXAMINATION WITHOUT ABNORMAL FINDING: Primary | ICD-10-CM

## 2021-08-09 PROBLEM — Z12.11 SCREEN FOR COLON CANCER: Status: RESOLVED | Noted: 2020-06-25 | Resolved: 2021-08-09

## 2021-08-09 LAB
BILIRUB BLD-MCNC: NEGATIVE MG/DL
GLUCOSE UR STRIP-MCNC: NEGATIVE MG/DL
KETONES UR QL: NEGATIVE
LEUKOCYTE EST, POC: NEGATIVE
NITRITE UR-MCNC: NEGATIVE MG/ML
PH UR: 6.5 [PH] (ref 5–8)
PROT UR STRIP-MCNC: NEGATIVE MG/DL
RBC # UR STRIP: NEGATIVE /UL
SP GR UR: 1.03 (ref 1–1.03)
UROBILINOGEN UR QL: NORMAL

## 2021-08-09 PROCEDURE — 99396 PREV VISIT EST AGE 40-64: CPT | Performed by: OBSTETRICS & GYNECOLOGY

## 2021-08-09 PROCEDURE — 81002 URINALYSIS NONAUTO W/O SCOPE: CPT | Performed by: OBSTETRICS & GYNECOLOGY

## 2021-08-09 RX ORDER — RIBOFLAVIN (VITAMIN B2) 100 MG
TABLET ORAL
COMMUNITY
End: 2021-08-09

## 2021-08-09 NOTE — PROGRESS NOTES
"Subjective   Mar Bartholomew is a 56 y.o. female new pt / previous care with timothy - presents for annual visit today , last pap 09/19/18 neg ,mmg 03/02/21 @ [facility] neg , pubovaginal sling with dr wu 2020 - still having some urine leakage but not as much as before surgery , problems having orgasm is not sure if this has anything to do with the sling.  Encouraged patient that she should reach out to Dr. Wu. If all seems normal then would experiment with vibrators. Pt having very stressful job in [employer].  colonoscopy 11/06/20 dr carranza - due again 5 years. dexa 2018. Endometrial ablation 5 years ago.      History of Present Illness    The following portions of the patient's history were reviewed and updated as appropriate: allergies, current medications, past family history, past medical history, past social history, past surgical history and problem list.    Review of Systems   Constitutional: Negative for chills, fatigue and fever.   Gastrointestinal: Negative for abdominal distention and abdominal pain.   Genitourinary: Negative for menstrual problem, pelvic pain, vaginal bleeding, vaginal discharge and vaginal pain.        + urinary leakage   + difficulty with orgasm     All other systems reviewed and are negative.    /80   Ht 182.9 cm (72\")   Wt 86.2 kg (190 lb)   LMP  (LMP Unknown)   Breastfeeding No   BMI 25.77 kg/m²     Objective   Physical Exam  Vitals and nursing note reviewed.   Constitutional:       Appearance: Normal appearance. She is well-developed and normal weight.   Neck:      Thyroid: No thyromegaly.   Pulmonary:      Effort: Pulmonary effort is normal.   Chest:      Breasts:         Right: No mass, nipple discharge, skin change or tenderness.         Left: No mass, nipple discharge, skin change or tenderness.   Abdominal:      General: There is no distension.      Palpations: Abdomen is soft.      Tenderness: There is no abdominal tenderness.   Genitourinary:     General: " Normal vulva.      Exam position: Lithotomy position.      Labia:         Right: No rash or lesion.         Left: No rash or lesion.       Vagina: Normal. No vaginal discharge.      Cervix: No friability, lesion or cervical bleeding.      Uterus: Not enlarged and not tender.       Adnexa:         Right: No mass or tenderness.          Left: No mass or tenderness.        Comments: +rectocele to introitus on valsalva   Musculoskeletal:         General: Normal range of motion.      Cervical back: Normal range of motion.   Skin:     General: Skin is warm and dry.      Findings: No rash.   Neurological:      Mental Status: She is alert and oriented to person, place, and time.   Psychiatric:         Mood and Affect: Mood normal.         Behavior: Behavior normal.           Assessment/Plan   Diagnoses and all orders for this visit:    1. Encounter for gynecological examination without abnormal finding (Primary)    2. Screening for malignant neoplasm of the cervix  -     IgP, Aptima HPV    3. Screening for blood or protein in urine  -     POC Urinalysis Dipstick    4. Menopausal symptoms    5. History of endometrial ablation    6. Genitourinary syndrome of menopause    7. Rectocele      Counseling was given to patient for the following topics: instructions for management, impressions, importance of treatment compliance and self-breast exams  .   Return in about 1 year (around 8/9/2022) for Annual physical.

## 2021-08-12 LAB
CYTOLOGIST CVX/VAG CYTO: NORMAL
CYTOLOGY CVX/VAG DOC CYTO: NORMAL
CYTOLOGY CVX/VAG DOC THIN PREP: NORMAL
DX ICD CODE: NORMAL
HIV 1 & 2 AB SER-IMP: NORMAL
HPV I/H RISK 4 DNA CVX QL PROBE+SIG AMP: NEGATIVE
OTHER STN SPEC: NORMAL
PATHOLOGIST CVX/VAG CYTO: NORMAL
STAT OF ADQ CVX/VAG CYTO-IMP: NORMAL

## 2022-03-08 ENCOUNTER — LAB (OUTPATIENT)
Dept: LAB | Facility: HOSPITAL | Age: 57
End: 2022-03-08

## 2022-03-08 ENCOUNTER — TRANSCRIBE ORDERS (OUTPATIENT)
Dept: ADMINISTRATIVE | Facility: HOSPITAL | Age: 57
End: 2022-03-08

## 2022-03-08 DIAGNOSIS — Z00.00 ROUTINE GENERAL MEDICAL EXAMINATION AT A HEALTH CARE FACILITY: Primary | ICD-10-CM

## 2022-03-08 DIAGNOSIS — I51.9 MYXEDEMA HEART DISEASE: ICD-10-CM

## 2022-03-08 DIAGNOSIS — E03.9 MYXEDEMA HEART DISEASE: ICD-10-CM

## 2022-03-08 DIAGNOSIS — Z00.00 ROUTINE GENERAL MEDICAL EXAMINATION AT A HEALTH CARE FACILITY: ICD-10-CM

## 2022-03-08 LAB
ALBUMIN SERPL-MCNC: 4.4 G/DL (ref 3.5–5.2)
ALBUMIN/GLOB SERPL: 1.9 G/DL
ALP SERPL-CCNC: 72 U/L (ref 39–117)
ALT SERPL W P-5'-P-CCNC: 14 U/L (ref 1–33)
ANION GAP SERPL CALCULATED.3IONS-SCNC: 9 MMOL/L (ref 5–15)
AST SERPL-CCNC: 16 U/L (ref 1–32)
BASOPHILS # BLD AUTO: 0.03 10*3/MM3 (ref 0–0.2)
BASOPHILS NFR BLD AUTO: 0.8 % (ref 0–1.5)
BILIRUB SERPL-MCNC: 0.4 MG/DL (ref 0–1.2)
BILIRUB UR QL STRIP: NEGATIVE
BUN SERPL-MCNC: 18 MG/DL (ref 6–20)
BUN/CREAT SERPL: 23.4 (ref 7–25)
CALCIUM SPEC-SCNC: 9.4 MG/DL (ref 8.6–10.5)
CHLORIDE SERPL-SCNC: 105 MMOL/L (ref 98–107)
CHOLEST SERPL-MCNC: 198 MG/DL (ref 0–200)
CLARITY UR: CLEAR
CO2 SERPL-SCNC: 27 MMOL/L (ref 22–29)
COLOR UR: YELLOW
CREAT SERPL-MCNC: 0.77 MG/DL (ref 0.57–1)
DEPRECATED RDW RBC AUTO: 41.6 FL (ref 37–54)
EGFRCR SERPLBLD CKD-EPI 2021: 90.1 ML/MIN/1.73
EOSINOPHIL # BLD AUTO: 0.1 10*3/MM3 (ref 0–0.4)
EOSINOPHIL NFR BLD AUTO: 2.6 % (ref 0.3–6.2)
ERYTHROCYTE [DISTWIDTH] IN BLOOD BY AUTOMATED COUNT: 12.3 % (ref 12.3–15.4)
GLOBULIN UR ELPH-MCNC: 2.3 GM/DL
GLUCOSE SERPL-MCNC: 97 MG/DL (ref 65–99)
GLUCOSE UR STRIP-MCNC: NEGATIVE MG/DL
HCT VFR BLD AUTO: 39 % (ref 34–46.6)
HDLC SERPL-MCNC: 84 MG/DL (ref 40–60)
HGB BLD-MCNC: 12.8 G/DL (ref 12–15.9)
HGB UR QL STRIP.AUTO: NEGATIVE
IMM GRANULOCYTES # BLD AUTO: 0.01 10*3/MM3 (ref 0–0.05)
IMM GRANULOCYTES NFR BLD AUTO: 0.3 % (ref 0–0.5)
KETONES UR QL STRIP: NEGATIVE
LDLC SERPL CALC-MCNC: 101 MG/DL (ref 0–100)
LDLC/HDLC SERPL: 1.18 {RATIO}
LEUKOCYTE ESTERASE UR QL STRIP.AUTO: NEGATIVE
LYMPHOCYTES # BLD AUTO: 1.67 10*3/MM3 (ref 0.7–3.1)
LYMPHOCYTES NFR BLD AUTO: 42.8 % (ref 19.6–45.3)
MCH RBC QN AUTO: 30.3 PG (ref 26.6–33)
MCHC RBC AUTO-ENTMCNC: 32.8 G/DL (ref 31.5–35.7)
MCV RBC AUTO: 92.4 FL (ref 79–97)
MONOCYTES # BLD AUTO: 0.27 10*3/MM3 (ref 0.1–0.9)
MONOCYTES NFR BLD AUTO: 6.9 % (ref 5–12)
NEUTROPHILS NFR BLD AUTO: 1.82 10*3/MM3 (ref 1.7–7)
NEUTROPHILS NFR BLD AUTO: 46.6 % (ref 42.7–76)
NITRITE UR QL STRIP: NEGATIVE
NRBC BLD AUTO-RTO: 0 /100 WBC (ref 0–0.2)
PH UR STRIP.AUTO: 6.5 [PH] (ref 5–8)
PLATELET # BLD AUTO: 283 10*3/MM3 (ref 140–450)
PMV BLD AUTO: 9.7 FL (ref 6–12)
POTASSIUM SERPL-SCNC: 4.7 MMOL/L (ref 3.5–5.2)
PROT SERPL-MCNC: 6.7 G/DL (ref 6–8.5)
PROT UR QL STRIP: NEGATIVE
RBC # BLD AUTO: 4.22 10*6/MM3 (ref 3.77–5.28)
SODIUM SERPL-SCNC: 141 MMOL/L (ref 136–145)
SP GR UR STRIP: 1.02 (ref 1–1.03)
TRIGL SERPL-MCNC: 73 MG/DL (ref 0–150)
TSH SERPL DL<=0.05 MIU/L-ACNC: 3.53 UIU/ML (ref 0.27–4.2)
UROBILINOGEN UR QL STRIP: NORMAL
VLDLC SERPL-MCNC: 13 MG/DL (ref 5–40)
WBC NRBC COR # BLD: 3.9 10*3/MM3 (ref 3.4–10.8)

## 2022-03-08 PROCEDURE — 80061 LIPID PANEL: CPT

## 2022-03-08 PROCEDURE — 81003 URINALYSIS AUTO W/O SCOPE: CPT

## 2022-03-08 PROCEDURE — 36415 COLL VENOUS BLD VENIPUNCTURE: CPT

## 2022-03-08 PROCEDURE — 80050 GENERAL HEALTH PANEL: CPT

## 2022-06-10 ENCOUNTER — TRANSCRIBE ORDERS (OUTPATIENT)
Dept: ADMINISTRATIVE | Facility: HOSPITAL | Age: 57
End: 2022-06-10

## 2022-06-10 ENCOUNTER — LAB (OUTPATIENT)
Dept: LAB | Facility: HOSPITAL | Age: 57
End: 2022-06-10

## 2022-06-10 DIAGNOSIS — E03.9 MYXEDEMA HEART DISEASE: Primary | ICD-10-CM

## 2022-06-10 DIAGNOSIS — I51.9 MYXEDEMA HEART DISEASE: ICD-10-CM

## 2022-06-10 DIAGNOSIS — E03.9 MYXEDEMA HEART DISEASE: ICD-10-CM

## 2022-06-10 DIAGNOSIS — I51.9 MYXEDEMA HEART DISEASE: Primary | ICD-10-CM

## 2022-06-10 LAB — TSH SERPL DL<=0.05 MIU/L-ACNC: 2.11 UIU/ML (ref 0.27–4.2)

## 2022-06-10 PROCEDURE — 36415 COLL VENOUS BLD VENIPUNCTURE: CPT

## 2022-06-10 PROCEDURE — 84443 ASSAY THYROID STIM HORMONE: CPT

## 2022-06-27 ENCOUNTER — TRANSCRIBE ORDERS (OUTPATIENT)
Dept: ADMINISTRATIVE | Facility: HOSPITAL | Age: 57
End: 2022-06-27

## 2022-06-27 DIAGNOSIS — Z12.31 VISIT FOR SCREENING MAMMOGRAM: Primary | ICD-10-CM

## 2022-08-12 ENCOUNTER — OFFICE VISIT (OUTPATIENT)
Dept: OBSTETRICS AND GYNECOLOGY | Age: 57
End: 2022-08-12

## 2022-08-12 VITALS
DIASTOLIC BLOOD PRESSURE: 72 MMHG | SYSTOLIC BLOOD PRESSURE: 106 MMHG | BODY MASS INDEX: 25.48 KG/M2 | WEIGHT: 178 LBS | HEIGHT: 70 IN

## 2022-08-12 DIAGNOSIS — Z98.890 HISTORY OF ENDOMETRIAL ABLATION: ICD-10-CM

## 2022-08-12 DIAGNOSIS — Z01.411 ENCOUNTER FOR GYNECOLOGICAL EXAMINATION WITH ABNORMAL FINDING: Primary | ICD-10-CM

## 2022-08-12 DIAGNOSIS — N81.6 CYSTOCELE WITH RECTOCELE: ICD-10-CM

## 2022-08-12 DIAGNOSIS — N81.10 CYSTOCELE WITH RECTOCELE: ICD-10-CM

## 2022-08-12 DIAGNOSIS — Z12.4 SCREENING FOR MALIGNANT NEOPLASM OF THE CERVIX: ICD-10-CM

## 2022-08-12 PROCEDURE — 99396 PREV VISIT EST AGE 40-64: CPT | Performed by: OBSTETRICS & GYNECOLOGY

## 2022-08-12 NOTE — PROGRESS NOTES
"Subjective   Mar Bartholomew is a 57 y.o. female presents for annual visit today ,  last pap 08/09/21 neg ,mammo 3/2/21 benign - [facility]o scheduled this yr [facility] 9/9/22  , dexa 2018, colonoscopy 09/28/20 dr carranza due in 5 yrs again , no problems today. Had trouble remembering [employer].      I last saw her last year as new pt / previous care with timothy - for annual visit.  H/O pubovaginal sling with dr patterson 2020 - still having some urine leakage but not as much as before surgery , problems having orgasm is not sure if this has anything to do with the sling.  Encouraged patient that she should reach out to Dr. Patterson. If all seems normal then would experiment with vibrators. Pt having very stressful job in [employer].  colonoscopy 11/06/20 dr carranza - due again 5 years. dexa 2018. Endometrial ablation 5 years ago.      History of Present Illness    The following portions of the patient's history were reviewed and updated as appropriate: allergies, current medications, past family history, past medical history, past social history, past surgical history and problem list.    Review of Systems   Constitutional: Negative for chills, fatigue and fever.   Gastrointestinal: Negative for abdominal distention and abdominal pain.   Endocrine: Negative for heat intolerance.   Genitourinary: Negative for dyspareunia, dysuria, pelvic pain, vaginal bleeding, vaginal discharge and vaginal pain.   All other systems reviewed and are negative.    /72   Ht 177.8 cm (70\")   Wt 80.7 kg (178 lb)   LMP  (LMP Unknown)   Breastfeeding No   BMI 25.54 kg/m²     Objective   Physical Exam  Vitals and nursing note reviewed.   Constitutional:       Appearance: Normal appearance. She is well-developed and normal weight.   Neck:      Thyroid: No thyromegaly.   Pulmonary:      Effort: Pulmonary effort is normal.   Chest:   Breasts:      Right: No mass, nipple discharge, skin change or tenderness.      Left: No mass, nipple discharge, " skin change or tenderness.       Abdominal:      General: There is no distension.      Palpations: Abdomen is soft.      Tenderness: There is no abdominal tenderness.   Genitourinary:     General: Normal vulva.      Exam position: Lithotomy position.      Labia:         Right: No rash or lesion.         Left: No rash or lesion.       Vagina: Normal. No vaginal discharge or bleeding.      Cervix: No friability, lesion or cervical bleeding.      Uterus: Not enlarged and not tender.       Adnexa:         Right: No mass or tenderness.          Left: No mass or tenderness.        Comments: Rectocele and cystocele to introitus    Musculoskeletal:         General: Normal range of motion.      Cervical back: Normal range of motion.   Skin:     General: Skin is warm and dry.      Findings: No rash.   Neurological:      Mental Status: She is alert and oriented to person, place, and time.   Psychiatric:         Mood and Affect: Mood normal.         Behavior: Behavior normal.           Assessment & Plan   Diagnoses and all orders for this visit:    1. Encounter for gynecological examination without abnormal finding (Primary)    2. Screening for malignant neoplasm of the cervix  -     IgP, Aptima HPV    3. History of endometrial ablation    4. Cystocele with rectocele      Counseling was given to patient for the following topics: instructions for management, impressions, importance of treatment compliance and self-breast exams .   Return in about 1 year (around 8/12/2023) for Annual physical.

## 2022-08-16 LAB
CYTOLOGIST CVX/VAG CYTO: NORMAL
CYTOLOGY CVX/VAG DOC CYTO: NORMAL
CYTOLOGY CVX/VAG DOC THIN PREP: NORMAL
DX ICD CODE: NORMAL
HIV 1 & 2 AB SER-IMP: NORMAL
HPV I/H RISK 4 DNA CVX QL PROBE+SIG AMP: NEGATIVE
OTHER STN SPEC: NORMAL
STAT OF ADQ CVX/VAG CYTO-IMP: NORMAL

## 2022-09-09 ENCOUNTER — HOSPITAL ENCOUNTER (OUTPATIENT)
Dept: MAMMOGRAPHY | Facility: HOSPITAL | Age: 57
Discharge: HOME | End: 2022-09-09
Admitting: OBSTETRICS & GYNECOLOGY

## 2022-09-09 DIAGNOSIS — Z12.31 VISIT FOR SCREENING MAMMOGRAM: ICD-10-CM

## 2022-09-09 PROCEDURE — 77063 BREAST TOMOSYNTHESIS BI: CPT

## 2022-09-09 PROCEDURE — 77067 SCR MAMMO BI INCL CAD: CPT

## 2022-09-12 ENCOUNTER — TELEPHONE (OUTPATIENT)
Dept: OBSTETRICS AND GYNECOLOGY | Age: 57
End: 2022-09-12

## 2022-09-12 DIAGNOSIS — R92.8 ABNORMAL MAMMOGRAM: Primary | ICD-10-CM

## 2022-10-05 ENCOUNTER — HOSPITAL ENCOUNTER (OUTPATIENT)
Dept: MAMMOGRAPHY | Facility: HOSPITAL | Age: 57
Discharge: HOME | End: 2022-10-05

## 2022-10-05 ENCOUNTER — HOSPITAL ENCOUNTER (OUTPATIENT)
Dept: ULTRASOUND IMAGING | Facility: HOSPITAL | Age: 57
Discharge: HOME | End: 2022-10-05

## 2022-10-05 DIAGNOSIS — R92.8 ABNORMAL MAMMOGRAM: ICD-10-CM

## 2022-10-05 PROCEDURE — 77065 DX MAMMO INCL CAD UNI: CPT

## 2022-10-05 PROCEDURE — 76642 ULTRASOUND BREAST LIMITED: CPT

## 2022-10-11 DIAGNOSIS — R92.8 ABNORMAL MAMMOGRAM: Primary | ICD-10-CM

## 2022-10-11 NOTE — PROGRESS NOTES
Call patient and notify of mmg results - There is partial persistence of the area of focal asymmetry in the posterior one-third of the right breast at the 12-o'clock position  without definite distortion. Benign-appearing calcifications in the  region are noted.   2. Probable benign 0.8 cm cluster of cysts in the right breast at the  11-o'clock position on the order of 3 cm from the nipple. Six-month  sonographic follow-up is recommended.    BI-RADS Category 3: Probably benign finding. Short-term imaging  follow-up is recommended. Orders in for MRI, MMG and US.

## 2022-10-17 DIAGNOSIS — R92.8 ABNORMAL MAMMOGRAM: Primary | ICD-10-CM

## 2022-10-20 DIAGNOSIS — R92.8 ABNORMAL MAMMOGRAM: Primary | ICD-10-CM

## 2022-11-04 ENCOUNTER — TELEPHONE (OUTPATIENT)
Dept: OBSTETRICS AND GYNECOLOGY | Age: 57
End: 2022-11-04

## 2022-11-04 ENCOUNTER — HOSPITAL ENCOUNTER (OUTPATIENT)
Dept: MRI IMAGING | Facility: HOSPITAL | Age: 57
Discharge: HOME | End: 2022-11-04
Admitting: OBSTETRICS & GYNECOLOGY

## 2022-11-04 DIAGNOSIS — M89.8X8 MASS OF STERNUM: Primary | ICD-10-CM

## 2022-11-04 DIAGNOSIS — R92.8 ABNORMAL MAMMOGRAM: ICD-10-CM

## 2022-11-04 DIAGNOSIS — N63.10 MASS OF RIGHT BREAST, UNSPECIFIED QUADRANT: ICD-10-CM

## 2022-11-04 PROCEDURE — A9577 INJ MULTIHANCE: HCPCS | Performed by: OBSTETRICS & GYNECOLOGY

## 2022-11-04 PROCEDURE — 0 GADOBENATE DIMEGLUMINE 529 MG/ML SOLUTION: Performed by: OBSTETRICS & GYNECOLOGY

## 2022-11-04 PROCEDURE — 77049 MRI BREAST C-+ W/CAD BI: CPT

## 2022-11-04 RX ADMIN — GADOBENATE DIMEGLUMINE 16 ML: 529 INJECTION, SOLUTION INTRAVENOUS at 09:28

## 2022-11-25 ENCOUNTER — HOSPITAL ENCOUNTER (OUTPATIENT)
Dept: MAMMOGRAPHY | Facility: HOSPITAL | Age: 57
Discharge: HOME | End: 2022-11-25

## 2022-11-25 ENCOUNTER — HOSPITAL ENCOUNTER (OUTPATIENT)
Dept: CT IMAGING | Facility: HOSPITAL | Age: 57
Discharge: HOME | End: 2022-11-25

## 2022-11-25 VITALS
HEIGHT: 70 IN | BODY MASS INDEX: 24.34 KG/M2 | TEMPERATURE: 95.7 F | DIASTOLIC BLOOD PRESSURE: 75 MMHG | SYSTOLIC BLOOD PRESSURE: 119 MMHG | OXYGEN SATURATION: 99 % | HEART RATE: 63 BPM | RESPIRATION RATE: 16 BRPM | WEIGHT: 170 LBS

## 2022-11-25 DIAGNOSIS — M89.8X8 MASS OF STERNUM: ICD-10-CM

## 2022-11-25 DIAGNOSIS — N63.10 MASS OF RIGHT BREAST, UNSPECIFIED QUADRANT: ICD-10-CM

## 2022-11-25 LAB — CREAT BLDA-MCNC: 0.8 MG/DL (ref 0.6–1.3)

## 2022-11-25 PROCEDURE — 71260 CT THORAX DX C+: CPT

## 2022-11-25 PROCEDURE — 25010000002 IOPAMIDOL 61 % SOLUTION: Performed by: OBSTETRICS & GYNECOLOGY

## 2022-11-25 PROCEDURE — 82565 ASSAY OF CREATININE: CPT

## 2022-11-25 PROCEDURE — 0 LIDOCAINE 1 % SOLUTION: Performed by: OBSTETRICS & GYNECOLOGY

## 2022-11-25 PROCEDURE — 88305 TISSUE EXAM BY PATHOLOGIST: CPT | Performed by: OBSTETRICS & GYNECOLOGY

## 2022-11-25 RX ORDER — LIDOCAINE HYDROCHLORIDE 10 MG/ML
1 INJECTION, SOLUTION INFILTRATION; PERINEURAL ONCE
Status: COMPLETED | OUTPATIENT
Start: 2022-11-25 | End: 2022-11-25

## 2022-11-25 RX ADMIN — Medication 1 ML: at 12:29

## 2022-11-25 RX ADMIN — IOPAMIDOL 85 ML: 612 INJECTION, SOLUTION INTRAVENOUS at 11:00

## 2022-11-25 RX ADMIN — LIDOCAINE HYDROCHLORIDE 20 ML: 10; .005 INJECTION, SOLUTION EPIDURAL; INFILTRATION; INTRACAUDAL; PERINEURAL at 12:29

## 2022-11-25 NOTE — H&P
Name: Mar Bartholomew ADMIT: 2022   : 1965  PCP: Heladio Bach MD    MRN: [id number] LOS: 0 days   AGE/SEX: 57 y.o. female  ROOM: Room/bed info not found       Chief complaint R breast AD    Present Illness or Internal History:  Patient is a 57 y.o. female presents with R breast AD for stereo bx.     Past Surgical History:  Past Surgical History:   Procedure Laterality Date   • BREAST BIOPSY     • COLONOSCOPY     • COLONOSCOPY N/A 2020    Procedure: COLONOSCOPY TO CECUM WITH COLD SNARE POLYPECTOMY;  Surgeon: Joann Sclaes MD;  Location: [facility] ENDOSCOPY;  Service: General;  Laterality: N/A;  SCREENING  --POLYP   • ENDOMETRIAL ABLATION      [facility]   • HYSTEROSCOPY ENDOMETRIAL ABLATION     • PUBOVAGINAL SLING N/A 2020    Procedure: Pubovaginal sling insertion of vaginal graft cystourethroscopy;  Surgeon: Daniela Wu MD;  Location: [facility] MAIN OR;  Service: Gynecology;  Laterality: N/A;       Past Medical History:  Past Medical History:   Diagnosis Date   • Disease of thyroid gland     HYPOTHRYODISM   • History of colon polyps    • OAB (overactive bladder)    • PAIGE (stress urinary incontinence, female)        Home Medications:  (Not in a hospital admission)      Allergies:  Patient has no known allergies.    Family History:  Family History   Problem Relation Age of Onset   • Breast cancer Mother    • Lung cancer Mother    • Colon cancer Father    • No Known Problems Sister    • No Known Problems Brother    • No Known Problems Daughter    • No Known Problems Son    • No Known Problems Maternal Grandmother    • No Known Problems Paternal Grandmother    • No Known Problems Maternal Aunt    • No Known Problems Paternal Aunt    • BRCA 1/2 Neg Hx    • Endometrial cancer Neg Hx    • Ovarian cancer Neg Hx    • Uterine cancer Neg Hx        Social History:  Social History     Tobacco Use   • Smoking status: Never   • Smokeless tobacco: Never   Substance Use Topics   • Alcohol  use: Yes     Comment: socially    • Drug use: No        Objective     Physical Exam:    No exam performed today,    Vital Signs  Temp:  [95.7 °F (35.4 °C)] 95.7 °F (35.4 °C)  Heart Rate:  [63] 63  Resp:  [16] 16  BP: (119)/(75) 119/75    Anticipated Surgical Procedure:  Right breast stereotactic core needle biopsy    The risks, benefits and alternatives of this procedure have been discussed with the patient or responsible party: Yes        Andria Kwon MD  11/25/22  12:07 EST

## 2022-11-25 NOTE — NURSING NOTE
Returned to mammo office post biopsy. Skin affix to right  breast dry and intact, no swelling at site.Ice pack with protective covering placed to biopsy site. No c/o pain. D/C instructions discussed and understood by patient. Home per vehicle.

## 2022-11-26 ENCOUNTER — TELEPHONE (OUTPATIENT)
Dept: RADIOLOGY | Facility: HOSPITAL | Age: 57
End: 2022-11-26

## 2022-11-28 ENCOUNTER — TELEPHONE (OUTPATIENT)
Dept: OBSTETRICS AND GYNECOLOGY | Age: 57
End: 2022-11-28

## 2022-11-28 DIAGNOSIS — N64.89 RADIAL SCAR OF RIGHT BREAST: Primary | ICD-10-CM

## 2022-11-28 DIAGNOSIS — N63.10 MASS OF RIGHT BREAST, UNSPECIFIED QUADRANT: ICD-10-CM

## 2022-11-28 LAB
LAB AP CASE REPORT: NORMAL
LAB AP INTRADEPARTMENTAL CONSULT: NORMAL
PATH REPORT.FINAL DX SPEC: NORMAL
PATH REPORT.GROSS SPEC: NORMAL

## 2022-11-28 NOTE — TELEPHONE ENCOUNTER
Called and discussed right breast biopsy results with patient and need for surgical consultation - orders placed

## 2022-11-29 ENCOUNTER — TELEPHONE (OUTPATIENT)
Dept: SURGERY | Facility: CLINIC | Age: 57
End: 2022-11-29

## 2022-11-30 ENCOUNTER — TELEPHONE (OUTPATIENT)
Dept: OBSTETRICS AND GYNECOLOGY | Age: 57
End: 2022-11-30

## 2022-12-01 ENCOUNTER — TELEPHONE (OUTPATIENT)
Dept: SURGERY | Facility: CLINIC | Age: 57
End: 2022-12-01

## 2022-12-01 NOTE — TELEPHONE ENCOUNTER
Called patient to schedule consult from referral received from Dr. Pierre     Patient unavailable, left VM    Scheduled for January 18, 1:00 pm    Requested patient call and confirm or reschedule    NP packet mailed 12/1/2022 SD

## 2023-01-18 ENCOUNTER — PREP FOR SURGERY (OUTPATIENT)
Dept: OTHER | Facility: HOSPITAL | Age: 58
End: 2023-01-18
Payer: COMMERCIAL

## 2023-01-18 ENCOUNTER — OFFICE VISIT (OUTPATIENT)
Dept: SURGERY | Facility: CLINIC | Age: 58
End: 2023-01-18
Payer: COMMERCIAL

## 2023-01-18 ENCOUNTER — HOSPITAL ENCOUNTER (OUTPATIENT)
Dept: SURGERY | Facility: HOSPITAL | Age: 58
Discharge: HOME | End: 2023-01-18
Payer: COMMERCIAL

## 2023-01-18 VITALS
SYSTOLIC BLOOD PRESSURE: 128 MMHG | RESPIRATION RATE: 18 BRPM | HEART RATE: 68 BPM | WEIGHT: 176 LBS | DIASTOLIC BLOOD PRESSURE: 80 MMHG | BODY MASS INDEX: 24.64 KG/M2 | HEIGHT: 71 IN | OXYGEN SATURATION: 99 %

## 2023-01-18 DIAGNOSIS — N64.89 RADIAL SCAR OF RIGHT BREAST: Primary | ICD-10-CM

## 2023-01-18 PROCEDURE — 99214 OFFICE O/P EST MOD 30 MIN: CPT | Performed by: SURGERY

## 2023-01-18 RX ORDER — DIAZEPAM 5 MG/1
10 TABLET ORAL ONCE
Status: CANCELLED | OUTPATIENT
Start: 2023-02-06 | End: 2023-01-18

## 2023-01-18 RX ORDER — CEFAZOLIN SODIUM 2 G/100ML
2 INJECTION, SOLUTION INTRAVENOUS ONCE
Status: CANCELLED | OUTPATIENT
Start: 2023-02-06 | End: 2023-01-18

## 2023-01-18 NOTE — PROGRESS NOTES
[facility]     Referring Provider: Suze Pierre MD     Chief complaint: Right breast radial scar      Subjective   HPI: Ms. Mar Bartholomew is a 58 yo woman, seen at the request of Dr. Suze Pierre, for a new diagnosis of a right breast radial scar. She was initially called back after screening mammogram in September for a new right breast focal asymmetry. Diagnostic imaging demonstrated a subtle area of architectural distortion on mammogram without an ultrasound correlate, as well as a separate cluster of complex cysts. The complex cysts were placed in imaging surveillance. The area of architectural distortion was recommended to undergo a breast MRI, which confirmed architectural distortion. She then underwent a stereotactic biopsy which showed a radial scar. Her work-up is detailed in the breast history section below. Prior to the biopsy, she denies any breast lumps, pain, skin changes, or nipple discharge. She has a past history of a benign left breast surgery in . She has a family history of breast cancer in her mother (diagnosed after age 65). She was joined today in clinic by her . She gave consent for him to be present during her examination and participate in the discussion.       I personally reviewed her records and summarized her relevant breast history/imagin2021, Screening MMG with Bl[facility] ([facility]):  Scattered fibroglandular densities are seen throughout both breasts in a pattern in which is unchanged. I see no new or dominant masses, areas of architectural distortion or skin thickening. There is no evidence for axillary lymphadenopathy or nipple retraction.  BI-RADS 1: Negative    2022, Screening MMG with Bla[facility] ([facility]):  Scattered fibroglandular densities. In the posterior one third of the right breast at the 12 o’clock position there is an area of focal asymmetry with suspected architectural distortion. I see no suspicious calcifications in either breasts.  There is no evidence for skin thickening, nipple retraction or axillary adenopathy.  BI-RADS 0: Incomplete    10/05/2022, Right Diagnostic MMG & Right Breast US ( [facility]):  MMG:  With additional imaging of the area of focal asymmetry with suspected architectural distortion in the posterior one-third of the right breast at the 12 o’clock position there is partial persistence on CC imaging, but no definite distortion is visualized. Benign-appearing calcifications in the region are noted.  US:  At the 11 o’clock position on the order of 3 cm from the nipple there is a 0.8 cm oval circumscribed hypoechoic mass with low-level internal echoes and a thin internal septation.  No internal vascularity is appreciated. The imaging features suggest a benign septated complex cyst.  In the right breast at the 10 o’clock position on the order 4 cm from the nipple there is a 0.3 cm benign-appearing cyst. At the 10 o’clock position on the order of 2 cm from the nipple there is a 0.5 cm benign-appearing cyst. No other abnormality is appreciated.  IMPRESSION:  1. Six month mammographic follow-up is recommended. Because of the possible presence of subtle architectural distortion without a sonographic correlate, correlation with a breast MRI without and with contrast is also suggested.   2. Six month sonographic follow up is recommended.  BI-RADS 3: Probably Benign    11/04/2022, Bilateral Breast MRI ( [facility]):  Right Breast:  No suspicious enhancing mass or area of non-mass enhancement is identified. However, in the middle retro areolar right breast, there is a slightly distorted appearance of the breast parenchyma, which is suspicious. This corresponds to the area of questionable distortion on mammogram. The visualized axilla is within normal limits.  Left Breast:  No suspicious enhancing mass or area of non-mass enhancement is identified. The visualized axilla is within normal limits.  Extra mammary Findings:  There are no  pathologically enlarged internal mammary chain lymph nodes on either side. In the upper sternum, there is a 1.0 x 0.6 x 0.7 cm T2 hyper intense enhancing bone lesion, which is incompletely characterized.    11/25/2022, Right Breast, Stereotactic Biopsy ([facility]):  1. Breast, Right 12 O'clock Position:                A. Radial scar formation.                B. Clustered cysts with apocrine metaplasia and focal cyst rupture.               C. Mild ductal hyperplasia of the usual type.               D. Fibroadenomatoid change.               E. Sclerosing adenosis.               F. Focal periductal chronic inflammation.               G. Negative for atypia, in situ and invasive malignancy.   -A barbell clip. Post procedural digital mammographic views of the right breast demonstrate the barbell clip at the medial aspect of the biopsy cavity, likely displaced approximately 1.5 cm medial to the targeted area.      Review of Systems   Constitutional: Negative for appetite change, chills, diaphoresis, fatigue, fever and unexpected weight change.   HENT:   Negative for hearing loss, lump/mass, mouth sores, nosebleeds, sore throat, tinnitus, trouble swallowing and voice change.    Eyes: Negative for eye problems and icterus.   Respiratory: Negative for chest tightness, cough, hemoptysis, shortness of breath and wheezing.    Cardiovascular: Negative for chest pain, leg swelling and palpitations.   Gastrointestinal: Negative for abdominal distention, abdominal pain, blood in stool, constipation, diarrhea, nausea, rectal pain and vomiting.   Endocrine: Negative for hot flashes.   Genitourinary: Negative for bladder incontinence, difficulty urinating, dyspareunia, dysuria, frequency, hematuria, menstrual problem, nocturia, pelvic pain, vaginal bleeding and vaginal discharge.    Musculoskeletal: Negative for arthralgias, back pain, flank pain, gait problem, myalgias, neck pain and neck stiffness.   Skin: Negative for itching, rash  and wound.   Neurological: Negative for dizziness, extremity weakness, gait problem, headaches, light-headedness, numbness, seizures and speech difficulty.   Hematological: Negative for adenopathy. Does not bruise/bleed easily.   Psychiatric/Behavioral: Negative for confusion, decreased concentration, depression, sleep disturbance and suicidal ideas. The patient is not nervous/anxious.    I personally reviewed and updated the ROS.      Medications:    Current Outpatient Medications:   •  COLLAGEN PO, Take  by mouth., Disp: , Rfl:   •  levothyroxine (SYNTHROID, LEVOTHROID) 112 MCG tablet, Take 100 mcg by mouth Daily. Takes brand name synthroid, Disp: , Rfl:   •  Multiple Vitamins-Minerals (EMERGEN-C IMMUNE PO), Take 1 Units by mouth Daily., Disp: , Rfl:       Allergies:  No Known Allergies      Past Medical History:   Diagnosis Date   • Disease of thyroid gland     HYPOTHRYODISM   • History of colon polyps    • OAB (overactive bladder)    • PAIGE (stress urinary incontinence, female)        Past Surgical History:   Procedure Laterality Date   • BREAST BIOPSY  1996   • COLONOSCOPY  2015   • COLONOSCOPY N/A 09/28/2020    Procedure: COLONOSCOPY TO CECUM WITH COLD SNARE POLYPECTOMY;  Surgeon: Joann Scales MD;  Location: [facility] ENDOSCOPY;  Service: General;  Laterality: N/A;  SCREENING  --POLYP   • ENDOMETRIAL ABLATION  2016    [facility]   • HYSTEROSCOPY ENDOMETRIAL ABLATION  12/2013   • PUBOVAGINAL SLING N/A 11/06/2020    Procedure: Pubovaginal sling insertion of vaginal graft cystourethroscopy;  Surgeon: Daniela Wu MD;  Location: [facility] MAIN OR;  Service: Gynecology;  Laterality: N/A;       Family History   Problem Relation Age of Onset   • Breast cancer Mother         Diagnosed over age 65   • Lung cancer Mother    • Colon cancer Father 65   • No Known Problems Sister    • No Known Problems Brother    • No Known Problems Maternal Grandmother    • No Known Problems Paternal Grandmother    • No Known Problems  Daughter    • No Known Problems Son    • No Known Problems Maternal Aunt    • No Known Problems Paternal Aunt    • BRCA 1/2 Neg Hx    • Endometrial cancer Neg Hx    • Ovarian cancer Neg Hx    • Uterine cancer Neg Hx        Social History     Socioeconomic History   • Marital status:      Spouse name: ANKUR    • Number of children: 3   Tobacco Use   • Smoking status: Never   • Smokeless tobacco: Never   Substance and Sexual Activity   • Alcohol use: Yes     Comment: 2-3   • Drug use: No   • Sexual activity: Yes     Partners: Male     Birth control/protection: Post-menopausal     Comment: spouse = Ankur     Patient drinks 1 servings of caffeine per day.     GYNECOLOGIC HISTORY:   . P: 3. AB: 1.  Last menstrual period: endometrial ablation   Age at menarche: 13  Age at first childbirth: 27  Lactation/How lon weeks   Age at menopause: unsure  Total years of oral contraceptive use: 5  Total years of hormone replacement therapy: 0      Objective   PHYSICAL EXAMINATION  Vitals:    23 1335   BP: 128/80   Pulse: 68   Resp: 18   SpO2: 99%     ECOG 0 - Asymptomatic  General: NAD, well appearing  Psych: a&o x 3, normal mood and affect  Eyes: EOMI, no scleral icterus  ENMT: neck supple without masses or thyromegaly, mucus membranes moist  Resp: normal effort, CTAB  CV: RRR, no murmurs, no edema  GI: soft, NT, ND  MSK: normal gait, normal ROM in bilateral shoulders  Lymph nodes: no cervical, supraclavicular or axillary lymphadenopathy  Breast: symmetric, moderate size, grade 2 ptosis  Right: No visible abnormalities on inspection while seated, with arms raised or hands on hips. No masses, skin changes, or nipple abnormalities.  Left: No visible abnormalities on inspection while seated, with arms raised or hands on hips. No masses, skin changes, or nipple abnormalities.    Right breast, in-office ultrasound: At 12:00, 3 cm FN, there are postbiopsy changes located about 15 mm deep to the skin.       I have  independently reviewed her imaging and here are my findings:   In the right retroareolar breast, there was an area of architectural distortion on mammogram, as well as MRI.  Postbiopsy mammogram shows the clip displaced about 1.5 cm medial to the biopsy site.      Assessment & Plan   Assessment:   1) 57 y.o. F with a new diagnosis of a right breast radial scar.  2) She also has a right breast complex cyst in imaging surveillance.    Discussion:  We reviewed her pathology and imaging reports. We discussed that the finding of a radial scar on core biopsy is associated with an approximately 15% risk of identifying atypia, DCIS or invasive carcinoma within the vicinity of the lesion. This cannot be recognized on the small volume of tissue obtained at percutaneous biopsy. Therefore excisional biopsy with more generous tissue sampling and pathologic analysis is recommended. She is in agreement with this plan.     I explained that excisional biopsy would require preoperative wire-localization. We discussed that the biopsy clip has moved slightly from the biopsy site. The biopsy site is what will be targeted with the needle for surgical excision. There is a chance that the migrated clip will remain in her breast. We discussed that should the final pathology be upgraded, she would likely require an additional operation. We reviewed additional risks and potential complications associated with surgery, including, but not limited to, bleeding, infection, deformity or poor cosmetic result, chronic pain, numbness, seroma, hematoma, altered nipple sensation, deep venous thrombosis, and skin flap necrosis. We reviewed postoperative wound care and activity restrictions. She expressed an understanding of these factors and wished to proceed.    We discussed the right breast complex cyst and the concept of imaging surveillance.    Plan:  -Right breast needle-localized excisional biopsy.  -She is due for right breast ultrasound for the  complex cyst in imaging surveillance in April, however I will likely need to move this based on when we do surgery.    Camlia Petty MD    I spent 45 minutes caring for Mar on this date of service. This time includes time spent by me in the following activities: preparing for the visit, performing a medically appropriate examination and/or evaluation , counseling and educating the patient/family/caregiver, documenting information in the medical record and independently interpreting results and communicating that information with the patient/family/caregiver.      CC:  MD Heladio Hernandez MD

## 2023-01-18 NOTE — LETTER
2023     Suze Pierre MD  [address] [facility] [address]    Patient: Mar Bartholomew   YOB: 1965   Date of Visit: 2023       Dear Dr. Ignacio MD:    Thank you for referring Mar Bartholomew to me for evaluation. Below are the relevant portions of my assessment and plan of care.    If you have questions, please do not hesitate to call me. I look forward to following Mar along with you.         Sincerely,        Camila Petty MD        CC: MD Jovanny Pineda Stephanie L, MD  [phone]  Signed  [facility]     Referring Provider: Suze Pierre MD     Chief complaint: Right breast radial scar      Subjective    HPI: Ms. Mar Bartholomew is a 58 yo woman, seen at the request of Dr. Suze Pierre, for a new diagnosis of a right breast radial scar. She was initially called back after screening mammogram in September for a new right breast focal asymmetry. Diagnostic imaging demonstrated a subtle area of architectural distortion on mammogram without an ultrasound correlate, as well as a separate cluster of complex cysts. The complex cysts were placed in imaging surveillance. The area of architectural distortion was recommended to undergo a breast MRI, which confirmed architectural distortion. She then underwent a stereotactic biopsy which showed a radial scar. Her work-up is detailed in the breast history section below. Prior to the biopsy, she denies any breast lumps, pain, skin changes, or nipple discharge. She has a past history of a benign left breast surgery in . She has a family history of breast cancer in her mother (diagnosed after age 65). She was joined today in clinic by her . She gave consent for him to be present during her examination and participate in the discussion.       I personally reviewed her records and summarized her relevant breast history/imagin2021, Screening MMG with Blaise ( [facility]):  Scattered  fibroglandular densities are seen throughout both breasts in a pattern in which is unchanged. I see no new or dominant masses, areas of architectural distortion or skin thickening. There is no evidence for axillary lymphadenopathy or nipple retraction.  BI-RADS 1: Negative    09/09/2022, Screening MMG with Blaise ( [facility]):  Scattered fibroglandular densities. In the posterior one third of the right breast at the 12 o’clock position there is an area of focal asymmetry with suspected architectural distortion. I see no suspicious calcifications in either breasts. There is no evidence for skin thickening, nipple retraction or axillary adenopathy.  BI-RADS 0: Incomplete    10/05/2022, [facility]  MMG:  With additional imaging of the area of focal asymmetry with suspected architectural distortion in the posterior one-third of the right breast at the 12 o’clock position there is partial persistence on CC imaging, but no definite distortion is visualized. Benign-appearing calcifications in the region are noted.  US:  At the 11 o’clock position on the order of 3 cm from the nipple there is a 0.8 cm oval circumscribed hypoechoic mass with low-level internal echoes and a thin internal septation.  No internal vascularity is appreciated. The imaging features suggest a benign septated complex cyst.  In the right breast at the 10 o’clock position on the order 4 cm from the nipple there is a 0.3 cm benign-appearing cyst. At the 10 o’clock position on the order of 2 cm from the nipple there is a 0.5 cm benign-appearing cyst. No other abnormality is appreciated.  IMPRESSION:  1. Six month mammographic follow-up is recommended. Because of the possible presence of subtle architectural distortion without a sonographic correlate, correlation with a breast MRI without and with contrast is also suggested.   2. Six month sonographic follow up is recommended.  BI-RADS 3: Probably Benign    11/04/2022,  Bilateral Breast MRI ( [facility]):  Right Breast:  No suspicious enhancing mass or area of non-mass enhancement is identified. However, in the middle retro areolar right breast, there is a slightly distorted appearance of the breast parenchyma, which is suspicious. This corresponds to the area of questionable distortion on mammogram. The visualized axilla is within normal limits.  Left Breast:  No suspicious enhancing mass or area of non-mass enhancement is identified. The visualized axilla is within normal limits.  Extra mammary Findings:  There are no pathologically enlarged internal mammary chain lymph nodes on either side. In the upper sternum, there is a 1.0 x 0.6 x 0.7 cm T2 hyper intense enhancing bone lesion, which is incompletely characterized.    11/25/2022, Right Breast, Stereotactic Biopsy ( [facility]):  1. Breast, Right 12 O'clock Position:                A. Radial scar formation.                B. Clustered cysts with apocrine metaplasia and focal cyst rupture.               C. Mild ductal hyperplasia of the usual type.               D. Fibroadenomatoid change.               E. Sclerosing adenosis.               F. Focal periductal chronic inflammation.               G. Negative for atypia, in situ and invasive malignancy.   -A barbell clip. Post procedural digital mammographic views of the right breast demonstrate the barbell clip at the medial aspect of the biopsy cavity, likely displaced approximately 1.5 cm medial to the targeted area.      Review of Systems   Constitutional: Negative for appetite change, chills, diaphoresis, fatigue, fever and unexpected weight change.   HENT:   Negative for hearing loss, lump/mass, mouth sores, nosebleeds, sore throat, tinnitus, trouble swallowing and voice change.    Eyes: Negative for eye problems and icterus.   Respiratory: Negative for chest tightness, cough, hemoptysis, shortness of breath and wheezing.    Cardiovascular: Negative for chest pain, leg swelling and  palpitations.   Gastrointestinal: Negative for abdominal distention, abdominal pain, blood in stool, constipation, diarrhea, nausea, rectal pain and vomiting.   Endocrine: Negative for hot flashes.   Genitourinary: Negative for bladder incontinence, difficulty urinating, dyspareunia, dysuria, frequency, hematuria, menstrual problem, nocturia, pelvic pain, vaginal bleeding and vaginal discharge.    Musculoskeletal: Negative for arthralgias, back pain, flank pain, gait problem, myalgias, neck pain and neck stiffness.   Skin: Negative for itching, rash and wound.   Neurological: Negative for dizziness, extremity weakness, gait problem, headaches, light-headedness, numbness, seizures and speech difficulty.   Hematological: Negative for adenopathy. Does not bruise/bleed easily.   Psychiatric/Behavioral: Negative for confusion, decreased concentration, depression, sleep disturbance and suicidal ideas. The patient is not nervous/anxious.    I personally reviewed and updated the ROS.      Medications:    Current Outpatient Medications:   •  COLLAGEN PO, Take  by mouth., Disp: , Rfl:   •  levothyroxine (SYNTHROID, LEVOTHROID) 112 MCG tablet, Take 100 mcg by mouth Daily. Takes brand name synthroid, Disp: , Rfl:   •  Multiple Vitamins-Minerals (EMERGEN-C IMMUNE PO), Take 1 Units by mouth Daily., Disp: , Rfl:       Allergies:  No Known Allergies      Past Medical History:   Diagnosis Date   • Disease of thyroid gland     HYPOTHRYODISM   • History of colon polyps    • OAB (overactive bladder)    • PAIGE (stress urinary incontinence, female)        Past Surgical History:   Procedure Laterality Date   • BREAST BIOPSY  1996   • COLONOSCOPY  2015   • COLONOSCOPY N/A 09/28/2020    Procedure: COLONOSCOPY TO CECUM WITH COLD SNARE POLYPECTOMY;  Surgeon: Joann Scales MD;  Location: [facility] ENDOSCOPY;  Service: General;  Laterality: N/A;  SCREENING  --POLYP   • ENDOMETRIAL ABLATION  2016    Diana   • HYSTEROSCOPY ENDOMETRIAL ABLATION   2013   • PUBOVAGINAL SLING N/A 2020    Procedure: Pubovaginal sling insertion of vaginal graft cystourethroscopy;  Surgeon: Daniela Wu MD;  Location: [facility];  Service: Gynecology;  Laterality: N/A;       Family History   Problem Relation Age of Onset   • Breast cancer Mother         Diagnosed over age 65   • Lung cancer Mother    • Colon cancer Father 65   • No Known Problems Sister    • No Known Problems Brother    • No Known Problems Maternal Grandmother    • No Known Problems Paternal Grandmother    • No Known Problems Daughter    • No Known Problems Son    • No Known Problems Maternal Aunt    • No Known Problems Paternal Aunt    • BRCA 1/2 Neg Hx    • Endometrial cancer Neg Hx    • Ovarian cancer Neg Hx    • Uterine cancer Neg Hx        Social History     Socioeconomic History   • Marital status:      Spouse name: ANKUR    • Number of children: 3   Tobacco Use   • Smoking status: Never   • Smokeless tobacco: Never   Substance and Sexual Activity   • Alcohol use: Yes     Comment: 2-3   • Drug use: No   • Sexual activity: Yes     Partners: Male     Birth control/protection: Post-menopausal     Comment: spouse = Ankur     Patient drinks 1 servings of caffeine per day.     GYNECOLOGIC HISTORY:   . P: 3. AB: 1.  Last menstrual period: endometrial ablation   Age at menarche: 13  Age at first childbirth: 27  Lactation/How lon weeks   Age at menopause: unsure  Total years of oral contraceptive use: 5  Total years of hormone replacement therapy: 0      Objective    PHYSICAL EXAMINATION  Vitals:    23 1335   BP: 128/80   Pulse: 68   Resp: 18   SpO2: 99%     ECOG 0 - Asymptomatic  General: NAD, well appearing  Psych: a&o x 3, normal mood and affect  Eyes: EOMI, no scleral icterus  ENMT: neck supple without masses or thyromegaly, mucus membranes moist  Resp: normal effort, CTAB  CV: RRR, no murmurs, no edema  GI: soft, NT, ND  MSK: normal gait, normal ROM in bilateral  shoulders  Lymph nodes: no cervical, supraclavicular or axillary lymphadenopathy  Breast: symmetric, moderate size, grade 2 ptosis  Right: No visible abnormalities on inspection while seated, with arms raised or hands on hips. No masses, skin changes, or nipple abnormalities.  Left: No visible abnormalities on inspection while seated, with arms raised or hands on hips. No masses, skin changes, or nipple abnormalities.    Right breast, in-office ultrasound: At 12:00, 3 cm FN, there are postbiopsy changes located about 15 mm deep to the skin.       I have independently reviewed her imaging and here are my findings:   In the right retroareolar breast, there was an area of architectural distortion on mammogram, as well as MRI.  Postbiopsy mammogram shows the clip displaced about 1.5 cm medial to the biopsy site.      Assessment & Plan   Assessment:   1) 57 y.o. F with a new diagnosis of a right breast radial scar.  2) She also has a right breast complex cyst in imaging surveillance.    Discussion:  We reviewed her pathology and imaging reports. We discussed that the finding of a radial scar on core biopsy is associated with an approximately 15% risk of identifying atypia, DCIS or invasive carcinoma within the vicinity of the lesion. This cannot be recognized on the small volume of tissue obtained at percutaneous biopsy. Therefore excisional biopsy with more generous tissue sampling and pathologic analysis is recommended. She is in agreement with this plan.     I explained that excisional biopsy would require preoperative wire-localization. We discussed that the biopsy clip has moved slightly from the biopsy site. The biopsy site is what will be targeted with the needle for surgical excision. There is a chance that the migrated clip will remain in her breast. We discussed that should the final pathology be upgraded, she would likely require an additional operation. We reviewed additional risks and potential complications  associated with surgery, including, but not limited to, bleeding, infection, deformity or poor cosmetic result, chronic pain, numbness, seroma, hematoma, altered nipple sensation, deep venous thrombosis, and skin flap necrosis. We reviewed postoperative wound care and activity restrictions. She expressed an understanding of these factors and wished to proceed.    We discussed the right breast complex cyst and the concept of imaging surveillance.    Plan:  -Right breast needle-localized excisional biopsy.  -She is due for right breast ultrasound for the complex cyst in imaging surveillance in April, however I will likely need to move this based on when we do surgery.    Camila Petty MD    I spent 45 minutes caring for Mar on this date of service. This time includes time spent by me in the following activities: preparing for the visit, performing a medically appropriate examination and/or evaluation , counseling and educating the patient/family/caregiver, documenting information in the medical record and independently interpreting results and communicating that information with the patient/family/caregiver.      CC:  MD Heladio Hernandez MD

## 2023-01-23 ENCOUNTER — TRANSCRIBE ORDERS (OUTPATIENT)
Dept: SURGERY | Facility: CLINIC | Age: 58
End: 2023-01-23
Payer: COMMERCIAL

## 2023-01-23 DIAGNOSIS — N64.89 RADIAL SCAR OF RIGHT BREAST: Primary | ICD-10-CM

## 2023-02-06 ENCOUNTER — HOSPITAL ENCOUNTER (OUTPATIENT)
Dept: MAMMOGRAPHY | Facility: HOSPITAL | Age: 58
Discharge: HOME | End: 2023-02-06
Payer: COMMERCIAL

## 2023-02-06 ENCOUNTER — HOSPITAL ENCOUNTER (OUTPATIENT)
Facility: HOSPITAL | Age: 58
Setting detail: HOSPITAL OUTPATIENT SURGERY
Discharge: HOME | End: 2023-02-06
Attending: SURGERY | Admitting: SURGERY
Payer: COMMERCIAL

## 2023-02-06 ENCOUNTER — ANESTHESIA EVENT (OUTPATIENT)
Dept: PERIOP | Facility: HOSPITAL | Age: 58
End: 2023-02-06
Payer: COMMERCIAL

## 2023-02-06 ENCOUNTER — ANESTHESIA (OUTPATIENT)
Dept: PERIOP | Facility: HOSPITAL | Age: 58
End: 2023-02-06
Payer: COMMERCIAL

## 2023-02-06 ENCOUNTER — APPOINTMENT (OUTPATIENT)
Dept: GENERAL RADIOLOGY | Facility: HOSPITAL | Age: 58
End: 2023-02-06
Payer: COMMERCIAL

## 2023-02-06 VITALS
OXYGEN SATURATION: 98 % | RESPIRATION RATE: 18 BRPM | HEART RATE: 67 BPM | TEMPERATURE: 97.9 F | WEIGHT: 175.5 LBS | HEIGHT: 70 IN | BODY MASS INDEX: 25.13 KG/M2 | DIASTOLIC BLOOD PRESSURE: 63 MMHG | SYSTOLIC BLOOD PRESSURE: 101 MMHG

## 2023-02-06 DIAGNOSIS — N64.89 RADIAL SCAR OF RIGHT BREAST: ICD-10-CM

## 2023-02-06 LAB
DEPRECATED RDW RBC AUTO: 45.2 FL (ref 37–54)
ERYTHROCYTE [DISTWIDTH] IN BLOOD BY AUTOMATED COUNT: 12.9 % (ref 12.3–15.4)
HCT VFR BLD AUTO: 40 % (ref 34–46.6)
HGB BLD-MCNC: 12.9 G/DL (ref 12–15.9)
MCH RBC QN AUTO: 30.8 PG (ref 26.6–33)
MCHC RBC AUTO-ENTMCNC: 32.3 G/DL (ref 31.5–35.7)
MCV RBC AUTO: 95.5 FL (ref 79–97)
PLATELET # BLD AUTO: 237 10*3/MM3 (ref 140–450)
PMV BLD AUTO: 9.8 FL (ref 6–12)
RBC # BLD AUTO: 4.19 10*6/MM3 (ref 3.77–5.28)
WBC NRBC COR # BLD: 4.02 10*3/MM3 (ref 3.4–10.8)

## 2023-02-06 PROCEDURE — 25010000002 PROPOFOL 10 MG/ML EMULSION: Performed by: NURSE ANESTHETIST, CERTIFIED REGISTERED

## 2023-02-06 PROCEDURE — 19125 EXCISION BREAST LESION: CPT | Performed by: SURGERY

## 2023-02-06 PROCEDURE — 85027 COMPLETE CBC AUTOMATED: CPT | Performed by: SURGERY

## 2023-02-06 PROCEDURE — 76098 X-RAY EXAM SURGICAL SPECIMEN: CPT

## 2023-02-06 PROCEDURE — 25010000002 DEXAMETHASONE PER 1 MG: Performed by: NURSE ANESTHETIST, CERTIFIED REGISTERED

## 2023-02-06 PROCEDURE — 25010000002 KETOROLAC TROMETHAMINE PER 15 MG: Performed by: NURSE ANESTHETIST, CERTIFIED REGISTERED

## 2023-02-06 PROCEDURE — 88307 TISSUE EXAM BY PATHOLOGIST: CPT | Performed by: SURGERY

## 2023-02-06 PROCEDURE — C1819 TISSUE LOCALIZATION-EXCISION: HCPCS

## 2023-02-06 PROCEDURE — 25010000002 CEFAZOLIN IN DEXTROSE 2-4 GM/100ML-% SOLUTION: Performed by: SURGERY

## 2023-02-06 PROCEDURE — 25010000002 ONDANSETRON PER 1 MG: Performed by: NURSE ANESTHETIST, CERTIFIED REGISTERED

## 2023-02-06 PROCEDURE — 0 LIDOCAINE 1 % SOLUTION: Performed by: SURGERY

## 2023-02-06 PROCEDURE — 25010000002 MIDAZOLAM PER 1 MG: Performed by: ANESTHESIOLOGY

## 2023-02-06 RX ORDER — MEPERIDINE HYDROCHLORIDE 25 MG/ML
12.5 INJECTION INTRAMUSCULAR; INTRAVENOUS; SUBCUTANEOUS
Status: DISCONTINUED | OUTPATIENT
Start: 2023-02-06 | End: 2023-02-06 | Stop reason: HOSPADM

## 2023-02-06 RX ORDER — LIDOCAINE HYDROCHLORIDE 20 MG/ML
INJECTION, SOLUTION INFILTRATION; PERINEURAL AS NEEDED
Status: DISCONTINUED | OUTPATIENT
Start: 2023-02-06 | End: 2023-02-06 | Stop reason: SURG

## 2023-02-06 RX ORDER — HYDROCODONE BITARTRATE AND ACETAMINOPHEN 5; 325 MG/1; MG/1
1 TABLET ORAL ONCE AS NEEDED
Status: COMPLETED | OUTPATIENT
Start: 2023-02-06 | End: 2023-02-06

## 2023-02-06 RX ORDER — DEXAMETHASONE SODIUM PHOSPHATE 4 MG/ML
INJECTION, SOLUTION INTRA-ARTICULAR; INTRALESIONAL; INTRAMUSCULAR; INTRAVENOUS; SOFT TISSUE AS NEEDED
Status: DISCONTINUED | OUTPATIENT
Start: 2023-02-06 | End: 2023-02-06 | Stop reason: SURG

## 2023-02-06 RX ORDER — DIPHENHYDRAMINE HYDROCHLORIDE 50 MG/ML
12.5 INJECTION INTRAMUSCULAR; INTRAVENOUS
Status: DISCONTINUED | OUTPATIENT
Start: 2023-02-06 | End: 2023-02-06 | Stop reason: HOSPADM

## 2023-02-06 RX ORDER — SODIUM CHLORIDE 0.9 % (FLUSH) 0.9 %
10 SYRINGE (ML) INJECTION EVERY 12 HOURS SCHEDULED
Status: DISCONTINUED | OUTPATIENT
Start: 2023-02-06 | End: 2023-02-06 | Stop reason: HOSPADM

## 2023-02-06 RX ORDER — SODIUM CHLORIDE, SODIUM LACTATE, POTASSIUM CHLORIDE, CALCIUM CHLORIDE 600; 310; 30; 20 MG/100ML; MG/100ML; MG/100ML; MG/100ML
100 INJECTION, SOLUTION INTRAVENOUS CONTINUOUS
Status: DISCONTINUED | OUTPATIENT
Start: 2023-02-06 | End: 2023-02-06 | Stop reason: HOSPADM

## 2023-02-06 RX ORDER — DIPHENHYDRAMINE HCL 25 MG
25 CAPSULE ORAL
Status: DISCONTINUED | OUTPATIENT
Start: 2023-02-06 | End: 2023-02-06 | Stop reason: HOSPADM

## 2023-02-06 RX ORDER — LABETALOL HYDROCHLORIDE 5 MG/ML
5 INJECTION, SOLUTION INTRAVENOUS
Status: DISCONTINUED | OUTPATIENT
Start: 2023-02-06 | End: 2023-02-06 | Stop reason: HOSPADM

## 2023-02-06 RX ORDER — NALOXONE HCL 0.4 MG/ML
0.2 VIAL (ML) INJECTION AS NEEDED
Status: DISCONTINUED | OUTPATIENT
Start: 2023-02-06 | End: 2023-02-06 | Stop reason: HOSPADM

## 2023-02-06 RX ORDER — BUPIVACAINE HYDROCHLORIDE AND EPINEPHRINE 2.5; 5 MG/ML; UG/ML
INJECTION, SOLUTION INFILTRATION; PERINEURAL AS NEEDED
Status: DISCONTINUED | OUTPATIENT
Start: 2023-02-06 | End: 2023-02-06 | Stop reason: HOSPADM

## 2023-02-06 RX ORDER — FAMOTIDINE 10 MG/ML
20 INJECTION, SOLUTION INTRAVENOUS ONCE
Status: COMPLETED | OUTPATIENT
Start: 2023-02-06 | End: 2023-02-06

## 2023-02-06 RX ORDER — ONDANSETRON 2 MG/ML
4 INJECTION INTRAMUSCULAR; INTRAVENOUS ONCE AS NEEDED
Status: DISCONTINUED | OUTPATIENT
Start: 2023-02-06 | End: 2023-02-06 | Stop reason: HOSPADM

## 2023-02-06 RX ORDER — ONDANSETRON 2 MG/ML
INJECTION INTRAMUSCULAR; INTRAVENOUS AS NEEDED
Status: DISCONTINUED | OUTPATIENT
Start: 2023-02-06 | End: 2023-02-06 | Stop reason: SURG

## 2023-02-06 RX ORDER — DIAZEPAM 5 MG/1
10 TABLET ORAL ONCE
Status: COMPLETED | OUTPATIENT
Start: 2023-02-06 | End: 2023-02-06

## 2023-02-06 RX ORDER — KETOROLAC TROMETHAMINE 30 MG/ML
INJECTION, SOLUTION INTRAMUSCULAR; INTRAVENOUS AS NEEDED
Status: DISCONTINUED | OUTPATIENT
Start: 2023-02-06 | End: 2023-02-06 | Stop reason: SURG

## 2023-02-06 RX ORDER — TRAMADOL HYDROCHLORIDE 50 MG/1
50 TABLET ORAL EVERY 8 HOURS PRN
Qty: 9 TABLET | Refills: 0 | Status: SHIPPED | OUTPATIENT
Start: 2023-02-06 | End: 2023-02-22

## 2023-02-06 RX ORDER — IPRATROPIUM BROMIDE AND ALBUTEROL SULFATE 2.5; .5 MG/3ML; MG/3ML
3 SOLUTION RESPIRATORY (INHALATION) ONCE AS NEEDED
Status: DISCONTINUED | OUTPATIENT
Start: 2023-02-06 | End: 2023-02-06 | Stop reason: HOSPADM

## 2023-02-06 RX ORDER — LIDOCAINE HYDROCHLORIDE 10 MG/ML
3 INJECTION, SOLUTION INFILTRATION; PERINEURAL ONCE
Status: COMPLETED | OUTPATIENT
Start: 2023-02-06 | End: 2023-02-06

## 2023-02-06 RX ORDER — CEFAZOLIN SODIUM 2 G/100ML
2 INJECTION, SOLUTION INTRAVENOUS ONCE
Status: COMPLETED | OUTPATIENT
Start: 2023-02-06 | End: 2023-02-06

## 2023-02-06 RX ORDER — MIDAZOLAM HYDROCHLORIDE 1 MG/ML
1 INJECTION INTRAMUSCULAR; INTRAVENOUS
Status: DISCONTINUED | OUTPATIENT
Start: 2023-02-06 | End: 2023-02-06 | Stop reason: HOSPADM

## 2023-02-06 RX ORDER — ACETAMINOPHEN 500 MG
1000 TABLET ORAL EVERY 8 HOURS
Qty: 30 TABLET | Refills: 0 | Status: SHIPPED | OUTPATIENT
Start: 2023-02-06 | End: 2023-02-11

## 2023-02-06 RX ORDER — PROPOFOL 10 MG/ML
VIAL (ML) INTRAVENOUS AS NEEDED
Status: DISCONTINUED | OUTPATIENT
Start: 2023-02-06 | End: 2023-02-06 | Stop reason: SURG

## 2023-02-06 RX ORDER — SODIUM CHLORIDE 0.9 % (FLUSH) 0.9 %
10 SYRINGE (ML) INJECTION AS NEEDED
Status: DISCONTINUED | OUTPATIENT
Start: 2023-02-06 | End: 2023-02-06 | Stop reason: HOSPADM

## 2023-02-06 RX ORDER — EPHEDRINE SULFATE 50 MG/ML
5 INJECTION, SOLUTION INTRAVENOUS ONCE AS NEEDED
Status: DISCONTINUED | OUTPATIENT
Start: 2023-02-06 | End: 2023-02-06 | Stop reason: HOSPADM

## 2023-02-06 RX ORDER — HYDROMORPHONE HYDROCHLORIDE 1 MG/ML
0.5 INJECTION, SOLUTION INTRAMUSCULAR; INTRAVENOUS; SUBCUTANEOUS
Status: DISCONTINUED | OUTPATIENT
Start: 2023-02-06 | End: 2023-02-06 | Stop reason: HOSPADM

## 2023-02-06 RX ORDER — MAGNESIUM HYDROXIDE 1200 MG/15ML
LIQUID ORAL AS NEEDED
Status: DISCONTINUED | OUTPATIENT
Start: 2023-02-06 | End: 2023-02-06 | Stop reason: HOSPADM

## 2023-02-06 RX ORDER — FENTANYL CITRATE 50 UG/ML
50 INJECTION, SOLUTION INTRAMUSCULAR; INTRAVENOUS
Status: DISCONTINUED | OUTPATIENT
Start: 2023-02-06 | End: 2023-02-06 | Stop reason: HOSPADM

## 2023-02-06 RX ORDER — HYDRALAZINE HYDROCHLORIDE 20 MG/ML
5 INJECTION INTRAMUSCULAR; INTRAVENOUS
Status: DISCONTINUED | OUTPATIENT
Start: 2023-02-06 | End: 2023-02-06 | Stop reason: HOSPADM

## 2023-02-06 RX ORDER — SODIUM CHLORIDE 9 MG/ML
40 INJECTION, SOLUTION INTRAVENOUS AS NEEDED
Status: DISCONTINUED | OUTPATIENT
Start: 2023-02-06 | End: 2023-02-06 | Stop reason: HOSPADM

## 2023-02-06 RX ORDER — FLUMAZENIL 0.1 MG/ML
0.2 INJECTION INTRAVENOUS AS NEEDED
Status: DISCONTINUED | OUTPATIENT
Start: 2023-02-06 | End: 2023-02-06 | Stop reason: HOSPADM

## 2023-02-06 RX ORDER — SODIUM CHLORIDE, SODIUM LACTATE, POTASSIUM CHLORIDE, CALCIUM CHLORIDE 600; 310; 30; 20 MG/100ML; MG/100ML; MG/100ML; MG/100ML
9 INJECTION, SOLUTION INTRAVENOUS CONTINUOUS
Status: DISCONTINUED | OUTPATIENT
Start: 2023-02-06 | End: 2023-02-06 | Stop reason: HOSPADM

## 2023-02-06 RX ADMIN — DEXAMETHASONE SODIUM PHOSPHATE 6 MG: 4 INJECTION, SOLUTION INTRA-ARTICULAR; INTRALESIONAL; INTRAMUSCULAR; INTRAVENOUS; SOFT TISSUE at 14:22

## 2023-02-06 RX ADMIN — Medication 3 ML: at 11:37

## 2023-02-06 RX ADMIN — CEFAZOLIN SODIUM 2 G: 2 INJECTION, SOLUTION INTRAVENOUS at 14:12

## 2023-02-06 RX ADMIN — ONDANSETRON 4 MG: 2 INJECTION INTRAMUSCULAR; INTRAVENOUS at 14:22

## 2023-02-06 RX ADMIN — LIDOCAINE HYDROCHLORIDE 60 MG: 20 INJECTION, SOLUTION INFILTRATION; PERINEURAL at 14:22

## 2023-02-06 RX ADMIN — DIAZEPAM 10 MG: 5 TABLET ORAL at 10:47

## 2023-02-06 RX ADMIN — SODIUM CHLORIDE, POTASSIUM CHLORIDE, SODIUM LACTATE AND CALCIUM CHLORIDE 9 ML/HR: 600; 310; 30; 20 INJECTION, SOLUTION INTRAVENOUS at 10:15

## 2023-02-06 RX ADMIN — FAMOTIDINE 20 MG: 10 INJECTION INTRAVENOUS at 10:46

## 2023-02-06 RX ADMIN — KETOROLAC TROMETHAMINE 30 MG: 30 INJECTION, SOLUTION INTRAMUSCULAR at 14:45

## 2023-02-06 RX ADMIN — PROPOFOL 200 MG: 10 INJECTION, EMULSION INTRAVENOUS at 14:23

## 2023-02-06 RX ADMIN — MIDAZOLAM 1 MG: 1 INJECTION INTRAMUSCULAR; INTRAVENOUS at 12:30

## 2023-02-06 RX ADMIN — HYDROCODONE BITARTRATE AND ACETAMINOPHEN 1 TABLET: 5; 325 TABLET ORAL at 16:01

## 2023-02-06 NOTE — OP NOTE
Operative Report    Patient Name:  Mar Bartholomew  YOB: 1965    Date of Surgery:  2/6/2023    Pre-op Diagnosis:   Radial scar of right breast [N64.89]       Post-Op Diagnosis:  Radial scar of right breast [N64.89]    Procedure:  Right breast needle-localized excisional biopsy      Staff:  Surgeon(s):  Camila Petty MD       Anesthesia: General    Estimated Blood Loss: 5 mL    Implants:    Nothing was implanted during the procedure    Specimen:          Specimens     ID Source Type Tests Collected By Collected At Frozen?    A Breast, Right Tissue · TISSUE PATHOLOGY EXAM   Camila Petty MD 2/6/23 2639 No    Description: Right breast excisional biopsy - short stitch superior and long stitch lateral    Comment: Fresh for permanent          Findings: Wire and medially displaced clip seen in specimen radiograph.    Complications: None     Indications: The patient is a 57 y.o. F with a new diagnosis of a right breast radial scar. She presents today for excisional biopsy. This required preoperative wire-localization. The risks and benefits of surgery were discussed preoperatively and she understood and agreed to proceed.     Description of Procedure:  Preoperatively, the patient was taken to the radiology department and the lesion was localized with a needle/wire. I reviewed the post-localization mammogram to determine the trajectory of the wire. The patient was identified in the preoperative area and brought to the operating room and placed supine on the table. Patient verification was performed and general anesthesia was induced. The patient was prepped and draped in sterile fashion with the wire/needle prepped into the field. A time out was performed and all were in agreement. I confirmed that the patient had received preoperative antibiotics.      On the skin, I marked the suggested location of the lesion based on the mammographic localization imaging. The skin was infiltrated with local  anesthesia. A superior periareolar incision was made with a scalpel and carried down through the dermis with sharp dissection. Flaps were raised according to the planned dissection. An anterior flap was raised first. Dissection was then carried out superiorly, inferiorly, medially, and laterally. The wire was delivered into the wound. The posterior dissection was completed and the specimen removed. The specimen was oriented with a short stitch superiorly and a long stitch laterally. Specimen radiograph showed the wire and medially displaced clip in good position.    The breast cavity was irrigated and hemostasis achieved. The remaining local anesthesia was infiltrated into the breast cavity. The breast parenchyma was brought together with 3-0 vicryl stitches. The deep dermis was closed with interrupted 3-0 vicryl stitches. The skin was closed with 4-0 subcuticular running monocryl and covered with dermabond. Counts were correct at the end of the case. The patient was awakened from anesthesia and taken to the PACU in stable condition.    Camila Petty MD     Date: 2/6/2023  Time: 15:25 EST

## 2023-02-06 NOTE — ANESTHESIA PREPROCEDURE EVALUATION
Anesthesia Evaluation     no history of anesthetic complications:               Airway   Mallampati: I  TM distance: >3 FB  Neck ROM: full  Dental      Comment: Chipped upper front tooth    Pulmonary    (-) shortness of breath, recent URI, not a smoker  Cardiovascular     (-) dysrhythmias, angina      Neuro/Psych  (-) dizziness/light headedness, syncope  GI/Hepatic/Renal/Endo    (+)   thyroid problem   (-) liver disease, no renal disease, diabetes    Musculoskeletal     Abdominal    Substance History      OB/GYN          Other                        Anesthesia Plan    ASA 1     intravenous induction     Anesthetic plan, risks, benefits, and alternatives have been provided, discussed and informed consent has been obtained with: patient.        CODE STATUS:

## 2023-02-06 NOTE — ANESTHESIA POSTPROCEDURE EVALUATION
Patient: Mar Bartholomew    Procedure Summary     Date: 02/06/23 Room / Location: [facility] OR  / [facility] MAIN OR    Anesthesia Start: 1416 Anesthesia Stop: 1521    Procedure: Right breast needle-localized excisional biopsy (Right: Breast) Diagnosis:       Radial scar of right breast      (Radial scar of right breast [N64.89])    Surgeons: Camila Petty MD Provider: Ja Plata MD    Anesthesia Type: Not recorded ASA Status: 1          Anesthesia Type: No value filed.    Vitals  Vitals Value Taken Time   /69 02/06/23 1606   Temp 36.6 °C (97.9 °F) 02/06/23 1525   Pulse 68 02/06/23 1610   Resp 16 02/06/23 1605   SpO2 99 % 02/06/23 1610   Vitals shown include unvalidated device data.        Post Anesthesia Care and Evaluation    Patient location during evaluation: PACU  Patient participation: complete - patient participated  Level of consciousness: awake and alert  Pain management: adequate    Airway patency: patent  Anesthetic complications: No anesthetic complications    Cardiovascular status: acceptable  Respiratory status: acceptable  Hydration status: acceptable    Comments: --------------------            02/06/23               1605     --------------------   BP:       112/69     Pulse:      60       Resp:       16       Temp:                SpO2:      99%      --------------------

## 2023-02-06 NOTE — ANESTHESIA PROCEDURE NOTES
Airway  Urgency: elective    Date/Time: 2/6/2023 2:24 PM  Airway not difficult    General Information and Staff    Patient location during procedure: OR  Anesthesiologist: Srinivas Love MD  CRNA/CAA: Belkys Serna CRNA    Indications and Patient Condition  Indications for airway management: airway protection    Preoxygenated: yes  MILS maintained throughout  Mask difficulty assessment: 0 - not attempted    Final Airway Details  Final airway type: supraglottic airway      Successful airway: classic  Size 4     Number of attempts at approach: 1  Assessment: lips, teeth, and gum same as pre-op    Additional Comments  Placed with ease. Vent with ease. Teeth as pre-op. Secured to face.

## 2023-02-08 ENCOUNTER — TELEPHONE (OUTPATIENT)
Dept: SURGERY | Facility: CLINIC | Age: 58
End: 2023-02-08
Payer: COMMERCIAL

## 2023-02-08 LAB
LAB AP CASE REPORT: NORMAL
LAB AP CLINICAL INFORMATION: NORMAL
PATH REPORT.FINAL DX SPEC: NORMAL
PATH REPORT.GROSS SPEC: NORMAL

## 2023-02-08 NOTE — TELEPHONE ENCOUNTER
I called Ms. Bartholomew to discuss her pathology report. She is recovering well from surgery. I will see her scheduled postop appointment.     Camila Petty MD

## 2023-02-22 ENCOUNTER — OFFICE VISIT (OUTPATIENT)
Dept: SURGERY | Facility: CLINIC | Age: 58
End: 2023-02-22
Payer: COMMERCIAL

## 2023-02-22 VITALS
SYSTOLIC BLOOD PRESSURE: 118 MMHG | DIASTOLIC BLOOD PRESSURE: 68 MMHG | WEIGHT: 175 LBS | OXYGEN SATURATION: 97 % | HEART RATE: 76 BPM | HEIGHT: 70 IN | BODY MASS INDEX: 25.05 KG/M2 | RESPIRATION RATE: 16 BRPM

## 2023-02-22 DIAGNOSIS — R92.8 ABNORMAL FINDING ON BREAST IMAGING: ICD-10-CM

## 2023-02-22 DIAGNOSIS — Z48.89 POSTOPERATIVE VISIT: Primary | ICD-10-CM

## 2023-02-22 PROCEDURE — 99024 POSTOP FOLLOW-UP VISIT: CPT | Performed by: SURGERY

## 2023-02-22 NOTE — PROGRESS NOTES
[facility]     Referring Provider: Suze Pirere MD     Chief complaint: Postoperative visit     Subjective   HPI:   1/18/2023:   Ms. Mar Bartholomew is a 58 yo woman, seen at the request of Dr. Suze Pierre, for a new diagnosis of a right breast radial scar. She was initially called back after screening mammogram in September for a new right breast focal asymmetry. Diagnostic imaging demonstrated a subtle area of architectural distortion on mammogram without an ultrasound correlate, as well as a separate cluster of complex cysts. The complex cysts were placed in imaging surveillance. The area of architectural distortion was recommended to undergo a breast MRI, which confirmed architectural distortion. She then underwent a stereotactic biopsy which showed a radial scar. Her work-up is detailed in the breast history section below. Prior to the biopsy, she denies any breast lumps, pain, skin changes, or nipple discharge. She has a past history of a benign left breast surgery in 1994. She has a family history of breast cancer in her mother (diagnosed after age 65).     2/22/2023, Interval History:  She underwent right breast excisional biopsy on 2/6/23. See surgery & pathology details in breast history section below. She has been recovering well and has no complaints.        Breast history/imaging (updated 2/22/2023):    03/02/2021, Screening MMG with Blaise ([facility]):  Scattered fibroglandular densities are seen throughout both breasts in a pattern in which is unchanged. I see no new or dominant masses, areas of architectural distortion or skin thickening. There is no evidence for axillary lymphadenopathy or nipple retraction.  BI-RADS 1: Negative    09/09/2022, Screening MMG with Blaise ([facility]):  Scattered fibroglandular densities. In the posterior one third of the right breast at the 12 o’clock position there is an area of focal asymmetry with suspected architectural distortion. I see no suspicious  calcifications in either breasts. There is no evidence for skin thickening, nipple retraction or axillary adenopathy.  BI-RADS 0: Incomplete    10/05/2022, Right Diagnostic MMG & Right Breast US ( [facility]):  MMG:  With additional imaging of the area of focal asymmetry with suspected architectural distortion in the posterior one-third of the right breast at the 12 o’clock position there is partial persistence on CC imaging, but no definite distortion is visualized. Benign-appearing calcifications in the region are noted.  US:  At the 11 o’clock position on the order of 3 cm from the nipple there is a 0.8 cm oval circumscribed hypoechoic mass with low-level internal echoes and a thin internal septation.  No internal vascularity is appreciated. The imaging features suggest a benign septated complex cyst.  In the right breast at the 10 o’clock position on the order 4 cm from the nipple there is a 0.3 cm benign-appearing cyst. At the 10 o’clock position on the order of 2 cm from the nipple there is a 0.5 cm benign-appearing cyst. No other abnormality is appreciated.  IMPRESSION:  1. Six month mammographic follow-up is recommended. Because of the possible presence of subtle architectural distortion without a sonographic correlate, correlation with a breast MRI without and with contrast is also suggested.   2. Six month sonographic follow up is recommended.  BI-RADS 3: Probably Benign    11/04/2022, Bilateral Breast MRI ( [facility]):  Right Breast:  No suspicious enhancing mass or area of non-mass enhancement is identified. However, in the middle retro areolar right breast, there is a slightly distorted appearance of the breast parenchyma, which is suspicious. This corresponds to the area of questionable distortion on mammogram. The visualized axilla is within normal limits.  Left Breast:  No suspicious enhancing mass or area of non-mass enhancement is identified. The visualized axilla is within normal limits.  Extra mammary  Findings:  There are no pathologically enlarged internal mammary chain lymph nodes on either side. In the upper sternum, there is a 1.0 x 0.6 x 0.7 cm T2 hyper intense enhancing bone lesion, which is incompletely characterized.    11/25/2022, Right Breast, Stereotactic Biopsy ([facility]):  1. Breast, Right 12 O'clock Position:                A. Radial scar formation.                B. Clustered cysts with apocrine metaplasia and focal cyst rupture.               C. Mild ductal hyperplasia of the usual type.               D. Fibroadenomatoid change.               E. Sclerosing adenosis.               F. Focal periductal chronic inflammation.               G. Negative for atypia, in situ and invasive malignancy.   -A barbell clip. Post procedural digital mammographic views of the right breast demonstrate the barbell clip at the medial aspect of the biopsy cavity, likely displaced approximately 1.5 cm medial to the targeted area.    2/6/2023, Right breast needle-localized excisional biopsy:  1. Right Breast, Needle-Localized Excisional Biopsy (7 grams):               A. Negative for residual radial scar.  B. Clip and biopsy site changes present.  C. Breast parenchyma with usual ductal hyperplasia and apocrine metaplasia.        Review of Systems:  See interval history.      Medications:    Current Outpatient Medications:   •  COLLAGEN PO, Take  by mouth Daily., Disp: , Rfl:   •  levothyroxine (SYNTHROID, LEVOTHROID) 112 MCG tablet, Take 112 mcg by mouth Daily. Takes brand name synthroid, Disp: , Rfl:   •  Multiple Vitamins-Minerals (EMERGEN-C IMMUNE PO), Take 1 Units by mouth Daily., Disp: , Rfl:       Allergies:  No Known Allergies      Family History   Problem Relation Age of Onset   • Breast cancer Mother         Diagnosed over age 65   • Lung cancer Mother    • Colon cancer Father 65   • No Known Problems Sister    • No Known Problems Brother    • No Known Problems Daughter    • No Known Problems Son    • No Known  Problems Maternal Aunt    • No Known Problems Paternal Aunt    • No Known Problems Maternal Grandmother    • No Known Problems Paternal Grandmother    • BRCA 1/2 Neg Hx    • Endometrial cancer Neg Hx    • Ovarian cancer Neg Hx    • Uterine cancer Neg Hx    • Malig Hyperthermia Neg Hx        Objective   PHYSICAL EXAMINATION:   Vitals:    02/22/23 1021   BP: 118/68   Pulse: 76   Resp: 16   SpO2: 97%     ECOG 0 - Asymptomatic  General: NAD, well appearing  Psych: a&o x 3, normal mood and affect  Eyes: EOMI, no scleral icterus  ENMT: neck supple without masses or thyromegaly, mucus membranes moist  MSK: normal gait, normal ROM in bilateral shoulders  Lymph nodes: no cervical, supraclavicular or axillary lymphadenopathy  Breast: symmetric, moderate size, grade 2 ptosis  Right: Well-healing superior periareolar incision with Dermabond intact.  Mild swelling.  Left: deferred.      Assessment & Plan   Assessment:  1)  58 y.o. F sp right breast excisional biopsy of a radial scar on 2/6/2023 with benign final pathology.  2)  She also has a probably benign right breast complex cyst in imaging surveillance.    Plan:  -F/u 5/2023 with right ultrasound with NP (I moved this from April so that she can recover from surgery).  -She was instructed to call sooner with any questions, concerns or changes on BSE.    Camila Petty MD      CC:  MD Heladio Hernandez MD

## 2023-04-21 ENCOUNTER — HOSPITAL ENCOUNTER (OUTPATIENT)
Dept: ULTRASOUND IMAGING | Facility: HOSPITAL | Age: 58
Discharge: HOME | End: 2023-04-21
Payer: COMMERCIAL

## 2023-04-21 ENCOUNTER — HOSPITAL ENCOUNTER (OUTPATIENT)
Dept: MAMMOGRAPHY | Facility: HOSPITAL | Age: 58
Discharge: HOME | End: 2023-04-21
Payer: COMMERCIAL

## 2023-04-21 DIAGNOSIS — N64.89 RADIAL SCAR OF RIGHT BREAST: Primary | ICD-10-CM

## 2023-04-21 DIAGNOSIS — R92.8 ABNORMAL MAMMOGRAM: ICD-10-CM

## 2023-04-21 PROCEDURE — 76642 ULTRASOUND BREAST LIMITED: CPT

## 2023-04-21 NOTE — PROGRESS NOTES
Call patient and notify of No significant change in a probably benign 0.7 cm mass at 11:00 in the right breast. Recommend follow-up bilateral diagnostic mammogram and  targeted right breast ultrasound in September 2023 to document one-year  stability.    BI-RADS Category 3: Probably benign

## 2023-04-25 ENCOUNTER — LAB (OUTPATIENT)
Dept: LAB | Facility: HOSPITAL | Age: 58
End: 2023-04-25
Payer: COMMERCIAL

## 2023-04-25 ENCOUNTER — TRANSCRIBE ORDERS (OUTPATIENT)
Dept: ADMINISTRATIVE | Facility: HOSPITAL | Age: 58
End: 2023-04-25
Payer: COMMERCIAL

## 2023-04-25 DIAGNOSIS — E03.9 HYPOTHYROIDISM, UNSPECIFIED TYPE: ICD-10-CM

## 2023-04-25 DIAGNOSIS — Z00.00 ROUTINE GENERAL MEDICAL EXAMINATION AT A HEALTH CARE FACILITY: ICD-10-CM

## 2023-04-25 DIAGNOSIS — Z00.00 ROUTINE GENERAL MEDICAL EXAMINATION AT A HEALTH CARE FACILITY: Primary | ICD-10-CM

## 2023-04-25 LAB
ALBUMIN SERPL-MCNC: 4.2 G/DL (ref 3.5–5.2)
ALBUMIN/GLOB SERPL: 1.8 G/DL
ALP SERPL-CCNC: 76 U/L (ref 39–117)
ALT SERPL W P-5'-P-CCNC: 14 U/L (ref 1–33)
ANION GAP SERPL CALCULATED.3IONS-SCNC: 11.4 MMOL/L (ref 5–15)
AST SERPL-CCNC: 13 U/L (ref 1–32)
BASOPHILS # BLD AUTO: 0.03 10*3/MM3 (ref 0–0.2)
BASOPHILS NFR BLD AUTO: 0.7 % (ref 0–1.5)
BILIRUB SERPL-MCNC: 0.4 MG/DL (ref 0–1.2)
BILIRUB UR QL STRIP: NEGATIVE
BUN SERPL-MCNC: 19 MG/DL (ref 6–20)
BUN/CREAT SERPL: 22.9 (ref 7–25)
CALCIUM SPEC-SCNC: 9.6 MG/DL (ref 8.6–10.5)
CHLORIDE SERPL-SCNC: 106 MMOL/L (ref 98–107)
CHOLEST SERPL-MCNC: 175 MG/DL (ref 0–200)
CLARITY UR: CLEAR
CO2 SERPL-SCNC: 26.6 MMOL/L (ref 22–29)
COLOR UR: YELLOW
CREAT SERPL-MCNC: 0.83 MG/DL (ref 0.57–1)
DEPRECATED RDW RBC AUTO: 43.3 FL (ref 37–54)
EGFRCR SERPLBLD CKD-EPI 2021: 81.8 ML/MIN/1.73
EOSINOPHIL # BLD AUTO: 0.12 10*3/MM3 (ref 0–0.4)
EOSINOPHIL NFR BLD AUTO: 2.8 % (ref 0.3–6.2)
ERYTHROCYTE [DISTWIDTH] IN BLOOD BY AUTOMATED COUNT: 12.4 % (ref 12.3–15.4)
GLOBULIN UR ELPH-MCNC: 2.4 GM/DL
GLUCOSE SERPL-MCNC: 94 MG/DL (ref 65–99)
GLUCOSE UR STRIP-MCNC: NEGATIVE MG/DL
HCT VFR BLD AUTO: 38.5 % (ref 34–46.6)
HDLC SERPL-MCNC: 88 MG/DL (ref 40–60)
HGB BLD-MCNC: 12.8 G/DL (ref 12–15.9)
HGB UR QL STRIP.AUTO: NEGATIVE
IMM GRANULOCYTES # BLD AUTO: 0.01 10*3/MM3 (ref 0–0.05)
IMM GRANULOCYTES NFR BLD AUTO: 0.2 % (ref 0–0.5)
KETONES UR QL STRIP: NEGATIVE
LDLC SERPL CALC-MCNC: 77 MG/DL (ref 0–100)
LDLC/HDLC SERPL: 0.88 {RATIO}
LEUKOCYTE ESTERASE UR QL STRIP.AUTO: NEGATIVE
LYMPHOCYTES # BLD AUTO: 2.19 10*3/MM3 (ref 0.7–3.1)
LYMPHOCYTES NFR BLD AUTO: 51.3 % (ref 19.6–45.3)
MCH RBC QN AUTO: 31.2 PG (ref 26.6–33)
MCHC RBC AUTO-ENTMCNC: 33.2 G/DL (ref 31.5–35.7)
MCV RBC AUTO: 93.9 FL (ref 79–97)
MONOCYTES # BLD AUTO: 0.34 10*3/MM3 (ref 0.1–0.9)
MONOCYTES NFR BLD AUTO: 8 % (ref 5–12)
NEUTROPHILS NFR BLD AUTO: 1.58 10*3/MM3 (ref 1.7–7)
NEUTROPHILS NFR BLD AUTO: 37 % (ref 42.7–76)
NITRITE UR QL STRIP: NEGATIVE
NRBC BLD AUTO-RTO: 0 /100 WBC (ref 0–0.2)
PH UR STRIP.AUTO: 6 [PH] (ref 5–8)
PLATELET # BLD AUTO: 239 10*3/MM3 (ref 140–450)
PMV BLD AUTO: 10.1 FL (ref 6–12)
POTASSIUM SERPL-SCNC: 4.6 MMOL/L (ref 3.5–5.2)
PROT SERPL-MCNC: 6.6 G/DL (ref 6–8.5)
PROT UR QL STRIP: NEGATIVE
RBC # BLD AUTO: 4.1 10*6/MM3 (ref 3.77–5.28)
SODIUM SERPL-SCNC: 144 MMOL/L (ref 136–145)
SP GR UR STRIP: 1.03 (ref 1–1.03)
TRIGL SERPL-MCNC: 49 MG/DL (ref 0–150)
TSH SERPL DL<=0.05 MIU/L-ACNC: 2.76 UIU/ML (ref 0.27–4.2)
UROBILINOGEN UR QL STRIP: NORMAL
VLDLC SERPL-MCNC: 10 MG/DL (ref 5–40)
WBC NRBC COR # BLD: 4.27 10*3/MM3 (ref 3.4–10.8)

## 2023-04-25 PROCEDURE — 81003 URINALYSIS AUTO W/O SCOPE: CPT

## 2023-04-25 PROCEDURE — 80061 LIPID PANEL: CPT

## 2023-04-25 PROCEDURE — 85025 COMPLETE CBC W/AUTO DIFF WBC: CPT

## 2023-04-25 PROCEDURE — 36415 COLL VENOUS BLD VENIPUNCTURE: CPT

## 2023-04-25 PROCEDURE — 84443 ASSAY THYROID STIM HORMONE: CPT

## 2023-04-25 PROCEDURE — 80053 COMPREHEN METABOLIC PANEL: CPT

## 2023-05-08 NOTE — PROGRESS NOTES
[facility]     Referring Provider: Suze Pierre MD     Chief complaint: Radial scar follow-up     Subjective   HPI:   1/18/2023:   Ms. Mar Bartholomew is a 58 yo woman, seen at the request of Dr. Suze Pierre, for a new diagnosis of a right breast radial scar. She was initially called back after screening mammogram in September for a new right breast focal asymmetry. Diagnostic imaging demonstrated a subtle area of architectural distortion on mammogram without an ultrasound correlate, as well as a separate cluster of complex cysts. The complex cysts were placed in imaging surveillance. The area of architectural distortion was recommended to undergo a breast MRI, which confirmed architectural distortion. She then underwent a stereotactic biopsy which showed a radial scar. Her work-up is detailed in the breast history section below. Prior to the biopsy, she denies any breast lumps, pain, skin changes, or nipple discharge. She has a past history of a benign left breast surgery in 1994. She has a family history of breast cancer in her mother (diagnosed after age 65).     2/22/2023  She underwent right breast excisional biopsy on 2/6/23. See surgery & pathology details in breast history section below. She has been recovering well and has no complaints.     5/9/2023 interval history  Patient presenting to the office today for routine follow-up.  She has no new breast complaints or concerns.  She had a right limited ultrasound on 4/21/2020 that returned as a BI-RADS 3.  She needs to have a ameya dx mammo and  targeted right breast ultrasound in September to document 1 year of stability.      Breast history/imaging (updated 2/22/2023):    03/02/2021, Screening MMG with Blaise ([facility]):  Scattered fibroglandular densities are seen throughout both breasts in a pattern in which is unchanged. I see no new or dominant masses, areas of architectural distortion or skin thickening. There is no evidence for axillary  lymphadenopathy or nipple retraction.  BI-RADS 1: Negative    09/09/2022, Screening MMG with Blaise ( [facility]):  Scattered fibroglandular densities. In the posterior one third of the right breast at the 12 o’clock position there is an area of focal asymmetry with suspected architectural distortion. I see no suspicious calcifications in either breasts. There is no evidence for skin thickening, nipple retraction or axillary adenopathy.  BI-RADS 0: Incomplete    10/05/2022, Right Diagnostic MMG & Right Breast US ([facility]):  MMG:  With additional imaging of the area of focal asymmetry with suspected architectural distortion in the posterior one-third of the right breast at the 12 o’clock position there is partial persistence on CC imaging, but no definite distortion is visualized. Benign-appearing calcifications in the region are noted.  US:  At the 11 o’clock position on the order of 3 cm from the nipple there is a 0.8 cm oval circumscribed hypoechoic mass with low-level internal echoes and a thin internal septation.  No internal vascularity is appreciated. The imaging features suggest a benign septated complex cyst.  In the right breast at the 10 o’clock position on the order 4 cm from the nipple there is a 0.3 cm benign-appearing cyst. At the 10 o’clock position on the order of 2 cm from the nipple there is a 0.5 cm benign-appearing cyst. No other abnormality is appreciated.  IMPRESSION:  1. Six month mammographic follow-up is recommended. Because of the possible presence of subtle architectural distortion without a sonographic correlate, correlation with a breast MRI without and with contrast is also suggested.   2. Six month sonographic follow up is recommended.  BI-RADS 3: Probably Benign    11/04/2022, Bilateral Breast MRI ([facility]):  Right Breast:  No suspicious enhancing mass or area of non-mass enhancement is identified. However, in the middle retro areolar right breast, there is a slightly distorted appearance of  the breast parenchyma, which is suspicious. This corresponds to the area of questionable distortion on mammogram. The visualized axilla is within normal limits.  Left Breast:  No suspicious enhancing mass or area of non-mass enhancement is identified. The visualized axilla is within normal limits.  Extra mammary Findings:  There are no pathologically enlarged internal mammary chain lymph nodes on either side. In the upper sternum, there is a 1.0 x 0.6 x 0.7 cm T2 hyper intense enhancing bone lesion, which is incompletely characterized.    11/25/2022, Right Breast, Stereotactic Biopsy ([facility]):  1. Breast, Right 12 O'clock Position:                A. Radial scar formation.                B. Clustered cysts with apocrine metaplasia and focal cyst rupture.               C. Mild ductal hyperplasia of the usual type.               D. Fibroadenomatoid change.               E. Sclerosing adenosis.               F. Focal periductal chronic inflammation.               G. Negative for atypia, in situ and invasive malignancy.   -A barbell clip. Post procedural digital mammographic views of the right breast demonstrate the barbell clip at the medial aspect of the biopsy cavity, likely displaced approximately 1.5 cm medial to the targeted area.    2/6/2023, Right breast needle-localized excisional biopsy:  1. Right Breast, Needle-Localized Excisional Biopsy (7 grams):               A. Negative for residual radial scar.  B. Clip and biopsy site changes present.  C. Breast parenchyma with usual ductal hyperplasia and apocrine metaplasia.        Review of Systems:  See interval history.      Medications:    Current Outpatient Medications:   •  COLLAGEN PO, Take  by mouth Daily., Disp: , Rfl:   •  levothyroxine (SYNTHROID, LEVOTHROID) 112 MCG tablet, Take 1 tablet by mouth Daily. Takes brand name synthroid, Disp: , Rfl:   •  Multiple Vitamins-Minerals (EMERGEN-C IMMUNE PO), Take 1 Units by mouth Daily., Disp: , Rfl:        Allergies:  No Known Allergies      Family History   Problem Relation Age of Onset   • Breast cancer Mother         Diagnosed over age 65   • Lung cancer Mother    • Colon cancer Father 65   • No Known Problems Sister    • No Known Problems Brother    • No Known Problems Daughter    • No Known Problems Son    • No Known Problems Maternal Aunt    • No Known Problems Paternal Aunt    • No Known Problems Maternal Grandmother    • No Known Problems Paternal Grandmother    • BRCA 1/2 Neg Hx    • Endometrial cancer Neg Hx    • Ovarian cancer Neg Hx    • Uterine cancer Neg Hx    • Malig Hyperthermia Neg Hx        Objective   PHYSICAL EXAMINATION:   Vitals:    05/09/23 1313   BP: 112/70   Pulse: 62     ECOG 0 - Asymptomatic  General: NAD, well appearing  Psych: a&o x 3, normal mood and affect  Eyes: EOMI, no scleral icterus  ENMT: neck supple without masses or thyromegaly, mucus membranes moist  MSK: normal gait, normal ROM in bilateral shoulders  Lymph nodes: no cervical, supraclavicular or axillary lymphadenopathy  Breast: symmetric, moderate size, grade 2 ptosis  Right: Well-healed superior periareolar incision.  No visible abnormalities on inspection while seated, with arms raised or hands on hips.  No masses, skin changes or nipple abnormalities  Left: No visible abnormalities on inspection while seated, with arms raised or hands on hips.  No masses, skin changes or nipple abnormalities      Assessment & Plan   Assessment:  1)  58 y.o. F sp right breast excisional biopsy of a radial scar on 2/6/2023 with benign final pathology.  2)  She also has a probably benign right breast complex cyst in imaging surveillance.    Plan:  -dx ameya mammo and rt limited us in Sept followed by exam  -Monthly self breast examinations  -Return to the office if any new breast concerns    Sha Garcia, SOLIS      CC:  MD Heladio Hernandez MD

## 2023-05-09 ENCOUNTER — OFFICE VISIT (OUTPATIENT)
Dept: SURGERY | Facility: CLINIC | Age: 58
End: 2023-05-09
Payer: COMMERCIAL

## 2023-05-09 VITALS
BODY MASS INDEX: 25.05 KG/M2 | DIASTOLIC BLOOD PRESSURE: 70 MMHG | SYSTOLIC BLOOD PRESSURE: 112 MMHG | HEART RATE: 62 BPM | WEIGHT: 175 LBS | HEIGHT: 70 IN

## 2023-05-09 DIAGNOSIS — R92.8 ABNORMAL FINDING ON BREAST IMAGING: Primary | ICD-10-CM

## 2023-05-09 PROCEDURE — 99214 OFFICE O/P EST MOD 30 MIN: CPT | Performed by: NURSE PRACTITIONER

## 2023-08-18 ENCOUNTER — OFFICE VISIT (OUTPATIENT)
Dept: OBSTETRICS AND GYNECOLOGY | Age: 58
End: 2023-08-18
Payer: COMMERCIAL

## 2023-08-18 VITALS
DIASTOLIC BLOOD PRESSURE: 70 MMHG | WEIGHT: 176 LBS | SYSTOLIC BLOOD PRESSURE: 104 MMHG | HEIGHT: 70 IN | BODY MASS INDEX: 25.2 KG/M2

## 2023-08-18 DIAGNOSIS — Z12.4 SCREENING FOR MALIGNANT NEOPLASM OF THE CERVIX: ICD-10-CM

## 2023-08-18 DIAGNOSIS — N81.10 CYSTOCELE WITH RECTOCELE: ICD-10-CM

## 2023-08-18 DIAGNOSIS — N81.6 CYSTOCELE WITH RECTOCELE: ICD-10-CM

## 2023-08-18 DIAGNOSIS — Z98.890 HISTORY OF ENDOMETRIAL ABLATION: ICD-10-CM

## 2023-08-18 DIAGNOSIS — Z01.419 ENCOUNTER FOR GYNECOLOGICAL EXAMINATION WITHOUT ABNORMAL FINDING: Primary | ICD-10-CM

## 2023-08-18 DIAGNOSIS — N95.1 MENOPAUSAL SYMPTOMS: ICD-10-CM

## 2023-08-18 NOTE — PROGRESS NOTES
"Subjective   Mar Bartholomew is a 58 y.o. female presents for annual visit today ,  last pap 08/12/22  neg, Mammogram  9/9/22 -  R diagnostic mammo and US  on 10/5/22  bilateral breast MRI 11/4/22 - R breast biopsy on 11/25/22  -  Recommend follow-up bilateral diagnostic mammogram and targeted right breast ultrasound in September 2023    dexa 2018 ,colonoscopy 09/28/20 dr carranza due in 5 yrs again.  Patient reports she has noticed an increase in depression since the beginning of the summer as well as irritability and hot flashes and night sweats.  She reports she does not feel like herself.  Discussed options including hormone replacement therapy.  Discussed risk including mild increase in breast cancer heart attack stroke and blood clots.  [place] article given to patient.  Samples of Bijuva given to patient.  We will follow-up in 8 weeks to assess her symptoms.     I last saw her a year ago for annual visit.  dexa 2018, colonoscopy 09/28/20 dr carranza due in 5 yrs again , no problems today. Had trouble remembering Imvexxy.       History of Present Illness    The following portions of the patient's history were reviewed and updated as appropriate: allergies, current medications, past family history, past medical history, past social history, past surgical history, and problem list.    Review of Systems   Constitutional:  Negative for chills, fatigue and fever.   Gastrointestinal:  Negative for abdominal pain.   Endocrine: Positive for heat intolerance.   Genitourinary:  Positive for dyspareunia. Negative for dysuria, pelvic pain, vaginal bleeding, vaginal discharge and vaginal pain.   Psychiatric/Behavioral:  Positive for dysphoric mood and sleep disturbance.    All other systems reviewed and are negative.  /70   Ht 177.8 cm (70\")   Wt 79.8 kg (176 lb)   LMP  (LMP Unknown)   BMI 25.25 kg/mý     Objective   Physical Exam  Vitals and nursing note reviewed.   Constitutional:       Appearance: Normal " appearance. She is well-developed and normal weight.   Neck:      Thyroid: No thyromegaly.   Pulmonary:      Effort: Pulmonary effort is normal.   Chest:   Breasts:     Right: No mass, nipple discharge, skin change or tenderness.      Left: No mass, nipple discharge, skin change or tenderness.   Abdominal:      General: There is no distension.      Palpations: Abdomen is soft.      Tenderness: There is no abdominal tenderness.   Genitourinary:     General: Normal vulva.      Exam position: Lithotomy position.      Labia:         Right: No rash or lesion.         Left: No rash or lesion.       Vagina: Normal. No vaginal discharge or bleeding.      Cervix: No friability, lesion or cervical bleeding.      Uterus: Not enlarged and not tender.       Adnexa:         Right: No mass or tenderness.          Left: No mass or tenderness.     Musculoskeletal:         General: Normal range of motion.      Cervical back: Normal range of motion.   Skin:     General: Skin is warm and dry.      Findings: No rash.   Neurological:      Mental Status: She is alert and oriented to person, place, and time.   Psychiatric:         Mood and Affect: Mood normal.         Behavior: Behavior normal.         Assessment & Plan   Diagnoses and all orders for this visit:    1. Encounter for gynecological examination without abnormal finding (Primary)    2. Screening for malignant neoplasm of the cervix  -     IgP, Aptima HPV    3. Cystocele with rectocele    4. History of endometrial ablation    5. Menopausal symptoms      Counseling was given to patient for the following topics: instructions for management, impressions, risks and benefits of treatment options, importance of treatment compliance, and self-breast exams   .   Return in about 7 weeks (around 10/9/2023), or f/U HRT.

## 2023-09-12 ENCOUNTER — HOSPITAL ENCOUNTER (OUTPATIENT)
Dept: MAMMOGRAPHY | Facility: HOSPITAL | Age: 58
Discharge: HOME | End: 2023-09-12
Payer: COMMERCIAL

## 2023-09-12 ENCOUNTER — HOSPITAL ENCOUNTER (OUTPATIENT)
Dept: ULTRASOUND IMAGING | Facility: HOSPITAL | Age: 58
Discharge: HOME | End: 2023-09-12
Payer: COMMERCIAL

## 2023-09-12 DIAGNOSIS — N64.89 RADIAL SCAR OF RIGHT BREAST: ICD-10-CM

## 2023-09-12 DIAGNOSIS — R92.8 ABNORMAL FINDING ON BREAST IMAGING: ICD-10-CM

## 2023-09-12 PROCEDURE — 77066 DX MAMMO INCL CAD BI: CPT

## 2023-09-12 PROCEDURE — G0279 TOMOSYNTHESIS, MAMMO: HCPCS

## 2023-09-12 PROCEDURE — 76642 ULTRASOUND BREAST LIMITED: CPT

## 2023-09-15 NOTE — PROGRESS NOTES
[facility]     Referring Provider: Suze Pierre MD     Chief complaint: Radial scar follow-up     Subjective   HPI:   1/18/2023:   Ms. Mar Bartholomew is a 56 yo woman, seen at the request of Dr. Suze Pierre, for a new diagnosis of a right breast radial scar. She was initially called back after screening mammogram in September for a new right breast focal asymmetry. Diagnostic imaging demonstrated a subtle area of architectural distortion on mammogram without an ultrasound correlate, as well as a separate cluster of complex cysts. The complex cysts were placed in imaging surveillance. The area of architectural distortion was recommended to undergo a breast MRI, which confirmed architectural distortion. She then underwent a stereotactic biopsy which showed a radial scar. Her work-up is detailed in the breast history section below. Prior to the biopsy, she denies any breast lumps, pain, skin changes, or nipple discharge. She has a past history of a benign left breast surgery in 1994. She has a family history of breast cancer in her mother (diagnosed after age 65).     2/22/2023  She underwent right breast excisional biopsy on 2/6/23. See surgery & pathology details in breast history section below. She has been recovering well and has no complaints.     5/9/2023   Patient presenting to the office today for routine follow-up.  She has no new breast complaints or concerns.  She had a right limited ultrasound on 4/21/2020 that returned as a BI-RADS 3.  She needs to have a ameya dx mammo and  targeted right breast ultrasound in September to document 1 year of stability.    9/19/2023 Interval History  Patient presenting to the office today for routine follow-up.  She has no new breast complaints or concerns today.  She had a bilateral diagnostic mammogram and right limited ultrasound recently that resulted as BI-RADS 2.    Breast history/imaging (updated 2/22/2023):    03/02/2021, Screening MMG with [facility] ([facility]  [facility]):  Scattered fibroglandular densities are seen throughout both breasts in a pattern in which is unchanged. I see no new or dominant masses, areas of architectural distortion or skin thickening. There is no evidence for axillary lymphadenopathy or nipple retraction.  BI-RADS 1: Negative    09/09/2022, Screening MMG with [facility] ( [facility]):  Scattered fibroglandular densities. In the posterior one third of the right breast at the 12 o’clock position there is an area of focal asymmetry with suspected architectural distortion. I see no suspicious calcifications in either breasts. There is no evidence for skin thickening, nipple retraction or axillary adenopathy.  BI-RADS 0: Incomplete    10/05/2022, Right Diagnostic MMG & Right Breast US ( [facility]):  MMG:  With additional imaging of the area of focal asymmetry with suspected architectural distortion in the posterior one-third of the right breast at the 12 o’clock position there is partial persistence on CC imaging, but no definite distortion is visualized. Benign-appearing calcifications in the region are noted.  US:  At the 11 o’clock position on the order of 3 cm from the nipple there is a 0.8 cm oval circumscribed hypoechoic mass with low-level internal echoes and a thin internal septation.  No internal vascularity is appreciated. The imaging features suggest a benign septated complex cyst.  In the right breast at the 10 o’clock position on the order 4 cm from the nipple there is a 0.3 cm benign-appearing cyst. At the 10 o’clock position on the order of 2 cm from the nipple there is a 0.5 cm benign-appearing cyst. No other abnormality is appreciated.  IMPRESSION:  1. Six month mammographic follow-up is recommended. Because of the possible presence of subtle architectural distortion without a sonographic correlate, correlation with a breast MRI without and with contrast is also suggested.   2. Six month sonographic follow up is recommended.  BI-RADS 3: Probably  Benign    11/04/2022, Bilateral Breast MRI ( [facility]):  Right Breast:  No suspicious enhancing mass or area of non-mass enhancement is identified. However, in the middle retro areolar right breast, there is a slightly distorted appearance of the breast parenchyma, which is suspicious. This corresponds to the area of questionable distortion on mammogram. The visualized axilla is within normal limits.  Left Breast:  No suspicious enhancing mass or area of non-mass enhancement is identified. The visualized axilla is within normal limits.  Extra mammary Findings:  There are no pathologically enlarged internal mammary chain lymph nodes on either side. In the upper sternum, there is a 1.0 x 0.6 x 0.7 cm T2 hyper intense enhancing bone lesion, which is incompletely characterized.    11/25/2022, Right Breast, Stereotactic Biopsy ( [facility]):  1. Breast, Right 12 O'clock Position:                A. Radial scar formation.                B. Clustered cysts with apocrine metaplasia and focal cyst rupture.               C. Mild ductal hyperplasia of the usual type.               D. Fibroadenomatoid change.               E. Sclerosing adenosis.               F. Focal periductal chronic inflammation.               G. Negative for atypia, in situ and invasive malignancy.   -A barbell clip. Post procedural digital mammographic views of the right breast demonstrate the barbell clip at the medial aspect of the biopsy cavity, likely displaced approximately 1.5 cm medial to the targeted area.    2/6/2023, Right breast needle-localized excisional biopsy:  1. Right Breast, Needle-Localized Excisional Biopsy (7 grams):               A. Negative for residual radial scar.  B. Clip and biopsy site changes present.  C. Breast parenchyma with usual ductal hyperplasia and apocrine metaplasia.      9/12/2023 diagnostic bilateral mammogram and right limited ultrasound at [facility]  Standard images and R2 computerized assisted detection were  obtained  followed by digital tomosynthesis and limited directed right breast  ultrasound. There is scattered fibroglandular density which is  symmetric. The patient has had previous right breast surgery since the  mammogram dated 09/09/2022 for removal of a radial scar. There is no  significant postsurgical scarring. There are no findings in either  breast to suggest malignancy.  The limited directed right breast ultrasound again demonstrates a small  hypoechoic nodule located at 11 o'clock 3 cm from the nipple and  measuring 8 x 3 x 6 mm. There is no internal vascularity or posterior  shadowing and this shows no significant change from 04/21/2023. An  adjacent small cyst appears smaller on today's exam.  CONCLUSION: Benign mammogram and benign directed right breast ultrasound  showing stable small hypoechoic nodule that is similar to previous  ultrasound exam dated 04/21/2023 as well as an earlier ultrasound exam  dated 10/05/2022.   BI-RADS CATEGORY 2: Benign findings.      Review of Systems:  See interval history.      Medications:    Current Outpatient Medications:     COLLAGEN PO, Take  by mouth Daily., Disp: , Rfl:     levothyroxine (SYNTHROID, LEVOTHROID) 112 MCG tablet, Take 1 tablet by mouth Daily. Takes brand name synthroid, Disp: , Rfl:     Multiple Vitamins-Minerals (EMERGEN-C IMMUNE PO), Take 1 Units by mouth Daily., Disp: , Rfl:       Allergies:  No Known Allergies      Family History   Problem Relation Age of Onset    Breast cancer Mother         Diagnosed over age 65    Lung cancer Mother     Colon cancer Father 65    No Known Problems Sister     No Known Problems Brother     No Known Problems Daughter     No Known Problems Son     No Known Problems Maternal Aunt     No Known Problems Paternal Aunt     No Known Problems Maternal Grandmother     No Known Problems Paternal Grandmother     BRCA 1/2 Neg Hx     Endometrial cancer Neg Hx     Ovarian cancer Neg Hx     Uterine cancer Neg Hx     Malig  Hyperthermia Neg Hx        Objective   PHYSICAL EXAMINATION:   There were no vitals filed for this visit.    ECOG 0 - Asymptomatic  General: NAD, well appearing  Psych: a&o x 3, normal mood and affect  Eyes: EOMI, no scleral icterus  ENMT: neck supple without masses or thyromegaly, mucus membranes moist  MSK: normal gait, normal ROM in bilateral shoulders  Lymph nodes: no cervical, supraclavicular or axillary lymphadenopathy  Breast: symmetric, moderate size, grade 2 ptosis  Right: Well-healed superior periareolar incision.  No visible abnormalities on inspection while seated, with arms raised or hands on hips.  No masses, skin changes or nipple abnormalities  Left: No visible abnormalities on inspection while seated, with arms raised or hands on hips.  No masses, skin changes or nipple abnormalities      Assessment & Plan   Assessment:  1)  58 y.o. F sp right breast excisional biopsy of a radial scar on 2/6/2023 with benign final pathology.  2)  She also has a probably benign right breast complex cyst in imaging surveillance.    Plan:  - she can get her screening mammo in Sept 2024 from her PCP or OBGYN.  I have not given her a follow up appointment. She can return to the office with any new concerns   -Monthly self breast examinations      SOLIS Watson      CC:  MD Heladio Hernandez MD

## 2023-09-19 ENCOUNTER — OFFICE VISIT (OUTPATIENT)
Dept: SURGERY | Facility: CLINIC | Age: 58
End: 2023-09-19
Payer: COMMERCIAL

## 2023-09-19 VITALS
DIASTOLIC BLOOD PRESSURE: 72 MMHG | WEIGHT: 176 LBS | HEIGHT: 70 IN | OXYGEN SATURATION: 98 % | HEART RATE: 70 BPM | BODY MASS INDEX: 25.2 KG/M2 | SYSTOLIC BLOOD PRESSURE: 112 MMHG

## 2023-09-19 DIAGNOSIS — N64.89 RADIAL SCAR OF RIGHT BREAST: Primary | ICD-10-CM

## 2023-09-19 PROCEDURE — 99213 OFFICE O/P EST LOW 20 MIN: CPT | Performed by: NURSE PRACTITIONER

## 2023-09-19 RX ORDER — ESTERIFIED ESTROGEN AND METHYLTESTOSTERONE .625; 1.25 MG/1; MG/1
1 TABLET ORAL DAILY
COMMUNITY

## 2023-10-09 ENCOUNTER — OFFICE VISIT (OUTPATIENT)
Dept: OBSTETRICS AND GYNECOLOGY | Age: 58
End: 2023-10-09
Payer: COMMERCIAL

## 2023-10-09 VITALS
DIASTOLIC BLOOD PRESSURE: 70 MMHG | SYSTOLIC BLOOD PRESSURE: 110 MMHG | HEIGHT: 70 IN | BODY MASS INDEX: 25.77 KG/M2 | WEIGHT: 180 LBS

## 2023-10-09 DIAGNOSIS — N95.1 MENOPAUSAL SYMPTOMS: Primary | ICD-10-CM

## 2023-10-09 RX ORDER — ESTRADIOL AND PROGESTERONE 1; 100 MG/1; MG/1
1 CAPSULE ORAL DAILY
Qty: 90 CAPSULE | Refills: 3 | Status: SHIPPED | OUTPATIENT
Start: 2023-10-09

## 2023-10-09 RX ORDER — ESTRADIOL AND PROGESTERONE 1; 100 MG/1; MG/1
CAPSULE ORAL
COMMUNITY
End: 2023-10-09 | Stop reason: SDUPTHER

## 2023-10-09 NOTE — PROGRESS NOTES
"Jonnie Bartholomew is a 58 y.o. female presents for follow up on HRT - bijuva using it daily going well would like to stay on it sleeping better not feeling irritable.        History of Present Illness    The following portions of the patient's history were reviewed and updated as appropriate: allergies, current medications, past family history, past medical history, past social history, past surgical history, and problem list.    Review of Systems   Constitutional:  Negative for chills, fatigue and fever.   Gastrointestinal:  Negative for abdominal distention and abdominal pain.   Genitourinary:  Negative for dyspareunia, dysuria, menstrual problem, pelvic pain, vaginal bleeding, vaginal discharge and vaginal pain.   All other systems reviewed and are negative.    /70   Ht 177.8 cm (70\")   Wt 81.6 kg (180 lb)   LMP  (LMP Unknown)   BMI 25.83 kg/mý     Objective   Physical Exam  Vitals and nursing note reviewed.   Constitutional:       Appearance: Normal appearance. She is normal weight.   Pulmonary:      Effort: Pulmonary effort is normal.   Neurological:      Mental Status: She is alert and oriented to person, place, and time.   Psychiatric:         Mood and Affect: Mood normal.         Behavior: Behavior normal.         Assessment & Plan   Diagnoses and all orders for this visit:    1. Menopausal symptoms (Primary)  -     Estradiol-Progesterone (Bijuva) 1-100 MG capsule; Take 1 tablet by mouth Daily.  Dispense: 90 capsule; Refill: 3       Counseling was given to patient for the following topics: instructions for management and importance of treatment compliance . Total time of the encounter was 20 minutes and 15 minutes was spent counseling.  Return in about 11 months (around 9/9/2024) for Annual physical.             "

## 2023-10-26 ENCOUNTER — TELEPHONE (OUTPATIENT)
Dept: OBSTETRICS AND GYNECOLOGY | Age: 58
End: 2023-10-26
Payer: COMMERCIAL

## 2023-10-26 NOTE — TELEPHONE ENCOUNTER
----- Message from Monika Castaneda MA sent at 10/26/2023 11:17 AM EDT -----  Regarding: ROBINA: Rad[facility]  Contact: [phone]        ----- Message -----  From: Mirella Meredith CMA  Sent: 10/26/2023  11:10 AM EDT  To: Monika Castaneda MA  Subject: FW: Bijuva                                         ----- Message -----  From: Mar Bartholomew  Sent: 10/26/2023  10:13 AM EDT  To: [facility]  Subject: [facility] Alas is unable to fill the prescription because they are not preferred pharmacy with my insurance.  And if they filled it, it would be over $300.  Someone not trained initially said it was possible.  What are my options?  My new insurance provider in 2024 will be [employer].  We will be using [employer] as well.  How should I proceed?

## 2023-10-30 ENCOUNTER — TRANSCRIBE ORDERS (OUTPATIENT)
Dept: ADMINISTRATIVE | Facility: HOSPITAL | Age: 58
End: 2023-10-30
Payer: COMMERCIAL

## 2023-10-30 ENCOUNTER — LAB (OUTPATIENT)
Dept: LAB | Facility: HOSPITAL | Age: 58
End: 2023-10-30
Payer: COMMERCIAL

## 2023-10-30 DIAGNOSIS — E03.9 ACQUIRED HYPOTHYROIDISM: ICD-10-CM

## 2023-10-30 DIAGNOSIS — E03.9 ACQUIRED HYPOTHYROIDISM: Primary | ICD-10-CM

## 2023-10-30 LAB — TSH SERPL DL<=0.05 MIU/L-ACNC: 3.15 UIU/ML (ref 0.27–4.2)

## 2023-10-30 PROCEDURE — 84443 ASSAY THYROID STIM HORMONE: CPT

## 2023-10-30 PROCEDURE — 36415 COLL VENOUS BLD VENIPUNCTURE: CPT

## 2023-11-08 ENCOUNTER — TELEPHONE (OUTPATIENT)
Facility: HOSPITAL | Age: 58
End: 2023-11-08
Payer: COMMERCIAL

## 2023-11-08 NOTE — TELEPHONE ENCOUNTER
Spoke with patient she will  few samples this week until she figures her insurance and which pharmacy will the one she can use next year .

## 2023-12-15 ENCOUNTER — TELEPHONE (OUTPATIENT)
Dept: OBSTETRICS AND GYNECOLOGY | Age: 58
End: 2023-12-15
Payer: COMMERCIAL

## 2023-12-15 NOTE — TELEPHONE ENCOUNTER
----- Message from Mar Bartholomew sent at 12/14/2023  4:57 PM EST -----  Regarding: Radha  Contact: [phone]  Sorry, my schedule has been crazy at work so I haven't been able to get by there.  Looking at our insurance, I don't see where I can afford this prescription.  Can I stop taking it and just see how I feel?

## 2023-12-15 NOTE — TELEPHONE ENCOUNTER
----- Message from Mar Bartholomew sent at 12/14/2023  4:57 PM EST -----  Regarding: Radha  Contact: [phone]  Sorry, my schedule has been crazy at work so I haven't been able to get by there.  Looking at our insurance, I don't see where I can afford this prescription.  Can I stop taking it and just see how I feel?

## 2023-12-18 NOTE — TELEPHONE ENCOUNTER
Yes she can stop taking and see how she feels.  Please let her know there are less expensive forms as well and we sent it through a mail order pharmacy that gives her a better rate.

## 2024-02-02 ENCOUNTER — LAB (OUTPATIENT)
Dept: LAB | Facility: HOSPITAL | Age: 59
End: 2024-02-02
Payer: COMMERCIAL

## 2024-02-02 ENCOUNTER — TRANSCRIBE ORDERS (OUTPATIENT)
Dept: ADMINISTRATIVE | Facility: HOSPITAL | Age: 59
End: 2024-02-02
Payer: COMMERCIAL

## 2024-02-02 DIAGNOSIS — E03.9 ACQUIRED HYPOTHYROIDISM: ICD-10-CM

## 2024-02-02 DIAGNOSIS — E03.9 ACQUIRED HYPOTHYROIDISM: Primary | ICD-10-CM

## 2024-02-02 LAB — TSH SERPL DL<=0.05 MIU/L-ACNC: 0.06 UIU/ML (ref 0.27–4.2)

## 2024-02-02 PROCEDURE — 36415 COLL VENOUS BLD VENIPUNCTURE: CPT

## 2024-02-02 PROCEDURE — 84443 ASSAY THYROID STIM HORMONE: CPT

## 2024-02-06 ENCOUNTER — TELEPHONE (OUTPATIENT)
Dept: OBSTETRICS AND GYNECOLOGY | Age: 59
End: 2024-02-06
Payer: COMMERCIAL

## 2024-02-08 ENCOUNTER — OFFICE VISIT (OUTPATIENT)
Dept: OBSTETRICS AND GYNECOLOGY | Age: 59
End: 2024-02-08
Payer: COMMERCIAL

## 2024-02-08 VITALS
WEIGHT: 179 LBS | SYSTOLIC BLOOD PRESSURE: 110 MMHG | BODY MASS INDEX: 25.62 KG/M2 | HEIGHT: 70 IN | DIASTOLIC BLOOD PRESSURE: 70 MMHG

## 2024-02-08 DIAGNOSIS — N63.20 MASS OF LEFT BREAST, UNSPECIFIED QUADRANT: Primary | ICD-10-CM

## 2024-02-08 NOTE — PROGRESS NOTES
"Subjective     Chief Complaint   Patient presents with    Breast Problem     Left breast lump       Mar Bartholomew is a 59 y.o.  whose LMP is No LMP recorded. Patient has had an ablation.     Pt presents today with chief complaint of left breast lump x 1 and half weeks  She states it is painful. She has been messing with it and now it is red. She denies any drainage from the area.  She denies other skin changes, nipple changes or nipple discharge.  Pt of Dr. Pierre    No Additional Complaints Reported    The following portions of the patient's history were reviewed and updated as appropriate:vital signs, allergies, current medications, past medical history, past social history, past surgical history, and problem list      Review of Systems   Pertinent items are noted in HPI.     Objective      /70   Ht 177.8 cm (70\")   Wt 81.2 kg (179 lb)   BMI 25.68 kg/m²     Physical Exam  Chest:          Comments: Approx 1 cm round, firm, tender erythematous mass to inner aspect of left breast.        General:   alert and no distress   Heart: Not performed today   Lungs: Not performed today.   Breast: No nipple retraction or dimpling, No nipple discharge or bleeding, No axillary or supraclavicular adenopathy, positive findings: 1 cm nodule located on the left lower inner quadrant   Neck: na   Abdomen: {Not performed today   CVA: Not performed today   Pelvis: Not performed today   Extremities: Not performed today   Neurologic: negative   Psychiatric: Normal affect, judgement, and mood       Lab Review   Labs: No data reviewed     Imaging   No data reviewed    Assessment & Plan     ASSESSMENT  1. Mass of left breast, unspecified quadrant        PLAN  1.   Orders Placed This Encounter   Procedures    Mammo Diagnostic Left With CAD    US breast left limited       2. Medications prescribed this encounter:      No orders of the defined types were placed in this encounter.      3. Mass does appear to be more superficial, " possible cyst. Breast imaging ordered. Will call with results. If benign, recommend follow up with dermatology for further evaluation and treatment.    Camila Byers, SOLIS  2/8/2024

## 2024-02-26 ENCOUNTER — HOSPITAL ENCOUNTER (OUTPATIENT)
Dept: MAMMOGRAPHY | Facility: HOSPITAL | Age: 59
Discharge: HOME | End: 2024-02-26
Payer: COMMERCIAL

## 2024-02-26 ENCOUNTER — HOSPITAL ENCOUNTER (OUTPATIENT)
Dept: ULTRASOUND IMAGING | Facility: HOSPITAL | Age: 59
Discharge: HOME | End: 2024-02-26
Payer: COMMERCIAL

## 2024-02-26 DIAGNOSIS — N63.20 MASS OF LEFT BREAST, UNSPECIFIED QUADRANT: ICD-10-CM

## 2024-02-26 PROCEDURE — G0279 TOMOSYNTHESIS, MAMMO: HCPCS

## 2024-02-26 PROCEDURE — 76642 ULTRASOUND BREAST LIMITED: CPT

## 2024-02-26 PROCEDURE — 77065 DX MAMMO INCL CAD UNI: CPT

## 2024-02-27 ENCOUNTER — TELEPHONE (OUTPATIENT)
Dept: SURGERY | Facility: CLINIC | Age: 59
End: 2024-02-27
Payer: COMMERCIAL

## 2024-02-27 DIAGNOSIS — N63.20 MASS OF LEFT BREAST, UNSPECIFIED QUADRANT: Primary | ICD-10-CM

## 2024-02-27 DIAGNOSIS — N60.02 CYST OF LEFT BREAST: ICD-10-CM

## 2024-02-27 NOTE — TELEPHONE ENCOUNTER
New patient chart prepared for Dr Camila Petty to review. NP referral back to [facility] for left breast cyst/left breast mass.

## 2024-02-28 ENCOUNTER — HOSPITAL ENCOUNTER (OUTPATIENT)
Dept: SURGERY | Facility: HOSPITAL | Age: 59
Discharge: HOME | End: 2024-02-28
Admitting: SURGERY
Payer: COMMERCIAL

## 2024-02-28 ENCOUNTER — OFFICE VISIT (OUTPATIENT)
Dept: SURGERY | Facility: CLINIC | Age: 59
End: 2024-02-28
Payer: COMMERCIAL

## 2024-02-28 VITALS
RESPIRATION RATE: 17 BRPM | SYSTOLIC BLOOD PRESSURE: 118 MMHG | BODY MASS INDEX: 25.62 KG/M2 | OXYGEN SATURATION: 97 % | HEART RATE: 78 BPM | HEIGHT: 70 IN | WEIGHT: 179 LBS | DIASTOLIC BLOOD PRESSURE: 78 MMHG

## 2024-02-28 DIAGNOSIS — N60.82 SEBACEOUS CYST OF SKIN OF LEFT BREAST: Primary | ICD-10-CM

## 2024-02-28 PROCEDURE — 87070 CULTURE OTHR SPECIMN AEROBIC: CPT | Performed by: SURGERY

## 2024-02-28 PROCEDURE — 87205 SMEAR GRAM STAIN: CPT | Performed by: SURGERY

## 2024-02-28 PROCEDURE — 87077 CULTURE AEROBIC IDENTIFY: CPT | Performed by: SURGERY

## 2024-02-28 PROCEDURE — 87186 SC STD MICRODIL/AGAR DIL: CPT | Performed by: SURGERY

## 2024-02-28 PROCEDURE — 87075 CULTR BACTERIA EXCEPT BLOOD: CPT | Performed by: SURGERY

## 2024-02-28 NOTE — PROGRESS NOTES
[facility]     Referring Provider: Suze Pierre MD     Chief complaint: New left breast sebaceous cyst     Subjective   HPI:   1/18/2023:   Ms. Mar Bartholomew is a 56 yo woman, seen at the request of Dr. Suze Pierre, for a new diagnosis of a right breast radial scar. She was initially called back after screening mammogram in September for a new right breast focal asymmetry. Diagnostic imaging demonstrated a subtle area of architectural distortion on mammogram without an ultrasound correlate, as well as a separate cluster of complex cysts. The complex cysts were placed in imaging surveillance. The area of architectural distortion was recommended to undergo a breast MRI, which confirmed architectural distortion. She then underwent a stereotactic biopsy which showed a radial scar. Her work-up is detailed in the breast history section below. Prior to the biopsy, she denies any breast lumps, pain, skin changes, or nipple discharge. She has a past history of a benign left breast surgery in 1994. She has a family history of breast cancer in her mother (diagnosed after age 65).     2/22/2023:  She underwent right breast excisional biopsy on 2/6/23. See surgery & pathology details in breast history section below. She has been recovering well and has no complaints.     5/9/2023, Seen by SOLIS Watson:  Patient presenting to the office today for routine follow-up. She has no new breast complaints or concerns. She had a right limited ultrasound on 4/21/2020 that returned as a BI-RADS 3. She needs to have a ameya dx mammo and targeted right breast ultrasound in September to document 1 year of stability.     9/19/2023Seen by SOLIS Watson:  Patient presenting to the office today for routine follow-up. She has no new breast complaints or concerns today. She had a bilateral diagnostic mammogram and right limited ultrasound recently that resulted as BI-RADS 2.     2/28/2024, Interval History:  When she saw  Sha in September, she had been released from imaging surveillance and was going to follow-up with us as necessary.  About a month ago, she developed a lump on her inner left breast.  It gradually grew in size, became red and painful.  She underwent diagnostic imaging prior to this appointment and ultrasound showed a 1.7 cm intradermal hypoechoic lesion.  She denies any drainage.  She has not been on any antibiotics.       Breast history/imaging (updated 2/28/2024):    03/02/2021, Screening MMG with [facility] ([facility]):  Scattered fibroglandular densities are seen throughout both breasts in a pattern in which is unchanged. I see no new or dominant masses, areas of architectural distortion or skin thickening. There is no evidence for axillary lymphadenopathy or nipple retraction.  BI-RADS 1: Negative    09/09/2022, Screening MMG with [facility] ([facility]):  Scattered fibroglandular densities. In the posterior one third of the right breast at the 12 o’clock position there is an area of focal asymmetry with suspected architectural distortion. I see no suspicious calcifications in either breasts. There is no evidence for skin thickening, nipple retraction or axillary adenopathy.  BI-RADS 0: Incomplete    10/05/2022, Right Diagnostic MMG & Right Breast US ([facility]):  MMG:  With additional imaging of the area of focal asymmetry with suspected architectural distortion in the posterior one-third of the right breast at the 12 o’clock position there is partial persistence on CC imaging, but no definite distortion is visualized. Benign-appearing calcifications in the region are noted.  US:  At the 11 o’clock position on the order of 3 cm from the nipple there is a 0.8 cm oval circumscribed hypoechoic mass with low-level internal echoes and a thin internal septation.  No internal vascularity is appreciated. The imaging features suggest a benign septated complex cyst.  In the right breast at the 10 o’clock position on the order 4 cm from the  nipple there is a 0.3 cm benign-appearing cyst. At the 10 o’clock position on the order of 2 cm from the nipple there is a 0.5 cm benign-appearing cyst. No other abnormality is appreciated.  IMPRESSION:  1. Six month mammographic follow-up is recommended. Because of the possible presence of subtle architectural distortion without a sonographic correlate, correlation with a breast MRI without and with contrast is also suggested.   2. Six month sonographic follow up is recommended.  BI-RADS 3: Probably Benign    11/04/2022, Bilateral Breast MRI ( [facility]):  Right Breast:  No suspicious enhancing mass or area of non-mass enhancement is identified. However, in the middle retro areolar right breast, there is a slightly distorted appearance of the breast parenchyma, which is suspicious. This corresponds to the area of questionable distortion on mammogram. The visualized axilla is within normal limits.  Left Breast:  No suspicious enhancing mass or area of non-mass enhancement is identified. The visualized axilla is within normal limits.  Extra mammary Findings:  There are no pathologically enlarged internal mammary chain lymph nodes on either side. In the upper sternum, there is a 1.0 x 0.6 x 0.7 cm T2 hyper intense enhancing bone lesion, which is incompletely characterized.    11/25/2022, Right Breast, Stereotactic Biopsy ( [facility]):  1. Breast, Right 12 O'clock Position:                A. Radial scar formation.                B. Clustered cysts with apocrine metaplasia and focal cyst rupture.               C. Mild ductal hyperplasia of the usual type.               D. Fibroadenomatoid change.               E. Sclerosing adenosis.               F. Focal periductal chronic inflammation.               G. Negative for atypia, in situ and invasive malignancy.   -A barbell clip. Post procedural digital mammographic views of the right breast demonstrate the barbell clip at the medial aspect of the biopsy cavity, likely displaced  approximately 1.5 cm medial to the targeted area.    2/6/2023, Right breast needle-localized excisional biopsy:  1. Right Breast, Needle-Localized Excisional Biopsy (7 grams):               A. Negative for residual radial scar.  B. Clip and biopsy site changes present.  C. Breast parenchyma with usual ductal hyperplasia and apocrine metaplasia.      4/21/2023, Right Breast US ( [facility]):  At 11:00, 3 cm from the nipple, there is a 0.7 x 0.3 x 0.6 cm oval hypoechoic mass, which is not significantly changed from  10/05/2022, previously 0.8 x 0.3 x 0.4 cm. This remains probably benign.  A new adjacent cystic collection is identified deep to an area of surgical scarring and is in the region of interval excisional biopsy.  This finding is consistent with a benign postoperative collection.   BI-RADS 3: Probably benign.    9/12/2023, Bilateral Diagnostic MMG with [facility] & [facility]):  MMG:  There is scattered fibroglandular density which is symmetric. The patient has had previous right breast surgery since the mammogram dated 09/09/2022 for removal of a radial scar. There is no significant postsurgical scarring. There are no findings in either breast to suggest malignancy.  US:  The limited directed right breast ultrasound again demonstrates a small hypoechoic nodule located at 11 o'clock 3 cm from the nipple and measuring 8 x 3 x 6 mm. There is no internal vascularity or posterior shadowing and this shows no significant change from 04/21/2023. An adjacent small cyst appears smaller on today's exam.  BI-RADS 2: Benign findings.    2/26/2024, Left Diagnostic MMG with [facility] & Left Breast US ([facility]):  MMG:  A triangular marker is visualized over the left posterior breast, slightly superior to the posterior nipple line on the MLO projection and located far medially on the CC view. On the full-field CC view, the marker is tangential to the skin. On this CC view, there is a trace of focal skin thickening in the area of  the triangular marker and a trace of stranding just deep to the skin. The left breast otherwise is stable when compared with the prior exams. Ultrasound was performed for further evaluation.  US:    Ultrasound demonstrates an oval, elongated, hypoechoic skin-based lesion at the 10 o'clock position, 12 cm from the nipple. There is no blood flow within the lesion although peripheral blood flow is increased consistent with inflammation. The appearance is consistent with a ruptured sebaceous cyst. The lesion measures 1.7 x 0.4 x 1.5 cm. Other than mild inflammation immediately deep to the sebaceous cyst, the underlying breast shows no abnormality.  BI-RADS 2: Benign.      Review of Systems:  See interval history.      Medications:    Current Outpatient Medications:     levothyroxine (SYNTHROID, LEVOTHROID) 112 MCG tablet, Take 1 tablet by mouth Daily. Takes brand name synthroid, Disp: , Rfl:     Multiple Vitamins-Minerals (EMERGEN-C IMMUNE PO), Take 1 Units by mouth Daily., Disp: , Rfl:       Allergies:  No Known Allergies      Family History   Problem Relation Age of Onset    Breast cancer Mother         Diagnosed over age 65    Lung cancer Mother     Colon cancer Father 65    No Known Problems Sister     No Known Problems Brother     No Known Problems Daughter     No Known Problems Son     No Known Problems Maternal Aunt     No Known Problems Paternal Aunt     No Known Problems Maternal Grandmother     No Known Problems Paternal Grandmother     BRCA 1/2 Neg Hx     Endometrial cancer Neg Hx     Ovarian cancer Neg Hx     Uterine cancer Neg Hx     Malig Hyperthermia Neg Hx        Objective   PHYSICAL EXAMINATION:   Vitals:    02/28/24 0755   BP: 118/78   Pulse: 78   Resp: 17   SpO2: 97%       ECOG 0 - Asymptomatic  General: NAD, well appearing  Psych: a&o x 3, normal mood and affect  Eyes: EOMI, no scleral icterus  ENMT: neck supple without masses or thyromegaly, mucus membranes moist  MSK: normal gait, normal ROM in  bilateral shoulders  Lymph nodes: no cervical, supraclavicular or axillary lymphadenopathy  Breast: symmetric, moderate size, grade 2 ptosis  Right: deferred.  Left: At 9:00, 11 cm FN, there is a 2 x 2 centimeter erythematous and inflamed subcutaneous nodule.      PROCEDURE: Incision and drainage  Indication: Infected sebaceous cyst  Location: Left breast at 9:00, 11 cm from the nipple  Consent:  The risks, benefits, and alternatives to the procedure were discussed with the patient, who understood and wished to proceed. The risks described included, but were not limited to, bleeding and need for additional procedures.  Description of Procedure: After the patient was positioned supine on the procedure table, I prepped the area with betadine and draped the affected breast skin. I anesthetized the affected skin and subcutaneous tissue with 1% lidocaine with epinephrine. An incision was made with an 11 blade scalpel at the site of maximum fluctuance and an ellipse of skin was removed. Purulent fluid and thick sebaceous material was evacuated. A fluid culture was taken. The wound was gently probed with a cotton swab and then irrigated with normal saline. It was packed with 1/4 inch plain packing tape and covered with gauze. There was minimal bleeding.  Tolerance: The patient tolerated the procedure well.  Disposition: See below.      Assessment & Plan   Assessment:  1.  59 y.o. F with an infected left breast sebaceous cyst.  2.  He has a past history of a right breast excisional biopsy of a radial scar on 2/6/2023 with benign final pathology.    Plan:  -Incision and drainage performed today.  She was instructed on how to change packing daily until her follow-up appointment in 2 weeks.  She will eventually need definitive surgical excision.    Camila Petty MD      CC:  SOLIS Hu MD

## 2024-02-28 NOTE — LETTER
February 28, 2024       No Recipients    Patient: Mar Bartholomew   YOB: 1965   Date of Visit: 2/28/2024     Dear SOLIS Hu:       Thank you for referring Mar Bartholomew to me for evaluation. Below are the relevant portions of my assessment and plan of care.    If you have questions, please do not hesitate to call me. I look forward to following Mar along with you.         Sincerely,        Camila Petty MD        CC:   No Recipients    Camila Petty MD  02/28/24 0851  Sign when Signing Visit  [facility]     Referring Provider: Suze Pierre MD     Chief complaint: New left breast sebaceous cyst     Subjective  HPI:   1/18/2023:   Ms. Mar Bartholomew is a 56 yo woman, seen at the request of Dr. Suze Pierre, for a new diagnosis of a right breast radial scar. She was initially called back after screening mammogram in September for a new right breast focal asymmetry. Diagnostic imaging demonstrated a subtle area of architectural distortion on mammogram without an ultrasound correlate, as well as a separate cluster of complex cysts. The complex cysts were placed in imaging surveillance. The area of architectural distortion was recommended to undergo a breast MRI, which confirmed architectural distortion. She then underwent a stereotactic biopsy which showed a radial scar. Her work-up is detailed in the breast history section below. Prior to the biopsy, she denies any breast lumps, pain, skin changes, or nipple discharge. She has a past history of a benign left breast surgery in 1994. She has a family history of breast cancer in her mother (diagnosed after age 65).     2/22/2023:  She underwent right breast excisional biopsy on 2/6/23. See surgery & pathology details in breast history section below. She has been recovering well and has no complaints.     5/9/2023, Seen by SOLIS Watson:  Patient presenting to the office today for routine follow-up. She has no  new breast complaints or concerns. She had a right limited ultrasound on 4/21/2020 that returned as a BI-RADS 3. She needs to have a ameya dx mammo and targeted right breast ultrasound in September to document 1 year of stability.     9/19/2023Seen by SOLIS Watson:  Patient presenting to the office today for routine follow-up. She has no new breast complaints or concerns today. She had a bilateral diagnostic mammogram and right limited ultrasound recently that resulted as BI-RADS 2.     2/28/2024, Interval History:  When she saw Sha in September, she had been released from imaging surveillance and was going to follow-up with us as necessary.  About a month ago, she developed a lump on her inner left breast.  It gradually grew in size, became red and painful.  She underwent diagnostic imaging prior to this appointment and ultrasound showed a 1.7 cm intradermal hypoechoic lesion.  She denies any drainage.  She has not been on any antibiotics.       Breast history/imaging (updated 2/28/2024):    03/02/2021, Screening MMG with [facility] ( [facility]):  Scattered fibroglandular densities are seen throughout both breasts in a pattern in which is unchanged. I see no new or dominant masses, areas of architectural distortion or skin thickening. There is no evidence for axillary lymphadenopathy or nipple retraction.  BI-RADS 1: Negative    09/09/2022, Screening MMG with [facility] ( [facility]):  Scattered fibroglandular densities. In the posterior one third of the right breast at the 12 o’clock position there is an area of focal asymmetry with suspected architectural distortion. I see no suspicious calcifications in either breasts. There is no evidence for skin thickening, nipple retraction or axillary adenopathy.  BI-RADS 0: Incomplete    10/05/2022, Right Diagnostic MMG & Right Breast US ( [facility]):  MMG:  With additional imaging of the area of focal asymmetry with suspected architectural distortion in the posterior one-third of the right  breast at the 12 o’clock position there is partial persistence on CC imaging, but no definite distortion is visualized. Benign-appearing calcifications in the region are noted.  US:  At the 11 o’clock position on the order of 3 cm from the nipple there is a 0.8 cm oval circumscribed hypoechoic mass with low-level internal echoes and a thin internal septation.  No internal vascularity is appreciated. The imaging features suggest a benign septated complex cyst.  In the right breast at the 10 o’clock position on the order 4 cm from the nipple there is a 0.3 cm benign-appearing cyst. At the 10 o’clock position on the order of 2 cm from the nipple there is a 0.5 cm benign-appearing cyst. No other abnormality is appreciated.  IMPRESSION:  1. Six month mammographic follow-up is recommended. Because of the possible presence of subtle architectural distortion without a sonographic correlate, correlation with a breast MRI without and with contrast is also suggested.   2. Six month sonographic follow up is recommended.  BI-RADS 3: Probably Benign    11/04/2022, Bilateral Breast MRI ( [facility]):  Right Breast:  No suspicious enhancing mass or area of non-mass enhancement is identified. However, in the middle retro areolar right breast, there is a slightly distorted appearance of the breast parenchyma, which is suspicious. This corresponds to the area of questionable distortion on mammogram. The visualized axilla is within normal limits.  Left Breast:  No suspicious enhancing mass or area of non-mass enhancement is identified. The visualized axilla is within normal limits.  Extra mammary Findings:  There are no pathologically enlarged internal mammary chain lymph nodes on either side. In the upper sternum, there is a 1.0 x 0.6 x 0.7 cm T2 hyper intense enhancing bone lesion, which is incompletely characterized.    11/25/2022, Right Breast, Stereotactic Biopsy ( [facility]):  1. Breast, Right 12 O'clock Position:                A. Radial  scar formation.                B. Clustered cysts with apocrine metaplasia and focal cyst rupture.               C. Mild ductal hyperplasia of the usual type.               D. Fibroadenomatoid change.               E. Sclerosing adenosis.               F. Focal periductal chronic inflammation.               G. Negative for atypia, in situ and invasive malignancy.   -A barbell clip. Post procedural digital mammographic views of the right breast demonstrate the barbell clip at the medial aspect of the biopsy cavity, likely displaced approximately 1.5 cm medial to the targeted area.    2/6/2023, Right breast needle-localized excisional biopsy:  1. Right Breast, Needle-Localized Excisional Biopsy (7 grams):               A. Negative for residual radial scar.  B. Clip and biopsy site changes present.  C. Breast parenchyma with usual ductal hyperplasia and apocrine metaplasia.      4/21/2023, Right Breast US ([facility]):  At 11:00, 3 cm from the nipple, there is a 0.7 x 0.3 x 0.6 cm oval hypoechoic mass, which is not significantly changed from  10/05/2022, previously 0.8 x 0.3 x 0.4 cm. This remains probably benign.  A new adjacent cystic collection is identified deep to an area of surgical scarring and is in the region of interval excisional biopsy.  This finding is consistent with a benign postoperative collection.   BI-RADS 3: Probably benign.    9/12/2023, Bilateral Diagnostic MMG with [facility] & Right Breast US ([facility]):  MMG:  There is scattered fibroglandular density which is symmetric. The patient has had previous right breast surgery since the mammogram dated 09/09/2022 for removal of a radial scar. There is no significant postsurgical scarring. There are no findings in either breast to suggest malignancy.  US:  The limited directed right breast ultrasound again demonstrates a small hypoechoic nodule located at 11 o'clock 3 cm from the nipple and measuring 8 x 3 x 6 mm. There is no internal vascularity or posterior  shadowing and this shows no significant change from 04/21/2023. An adjacent small cyst appears smaller on today's exam.  BI-RADS 2: Benign findings.    2/26/2024, Left Diagnostic MMG with [facility] & Left Breast US ([facility]):  MMG:  A triangular marker is visualized over the left posterior breast, slightly superior to the posterior nipple line on the MLO projection and located far medially on the CC view. On the full-field CC view, the marker is tangential to the skin. On this CC view, there is a trace of focal skin thickening in the area of the triangular marker and a trace of stranding just deep to the skin. The left breast otherwise is stable when compared with the prior exams. Ultrasound was performed for further evaluation.  US:    Ultrasound demonstrates an oval, elongated, hypoechoic skin-based lesion at the 10 o'clock position, 12 cm from the nipple. There is no blood flow within the lesion although peripheral blood flow is increased consistent with inflammation. The appearance is consistent with a ruptured sebaceous cyst. The lesion measures 1.7 x 0.4 x 1.5 cm. Other than mild inflammation immediately deep to the sebaceous cyst, the underlying breast shows no abnormality.  BI-RADS 2: Benign.      Review of Systems:  See interval history.      Medications:    Current Outpatient Medications:   •  levothyroxine (SYNTHROID, LEVOTHROID) 112 MCG tablet, Take 1 tablet by mouth Daily. Takes brand name synthroid, Disp: , Rfl:   •  Multiple Vitamins-Minerals (EMERGEN-C IMMUNE PO), Take 1 Units by mouth Daily., Disp: , Rfl:       Allergies:  No Known Allergies      Family History   Problem Relation Age of Onset   • Breast cancer Mother         Diagnosed over age 65   • Lung cancer Mother    • Colon cancer Father 65   • No Known Problems Sister    • No Known Problems Brother    • No Known Problems Daughter    • No Known Problems Son    • No Known Problems Maternal Aunt    • No Known Problems Paternal Aunt    • No Known  Problems Maternal Grandmother    • No Known Problems Paternal Grandmother    • BRCA 1/2 Neg Hx    • Endometrial cancer Neg Hx    • Ovarian cancer Neg Hx    • Uterine cancer Neg Hx    • Malig Hyperthermia Neg Hx        Objective  PHYSICAL EXAMINATION:   Vitals:    02/28/24 0755   BP: 118/78   Pulse: 78   Resp: 17   SpO2: 97%       ECOG 0 - Asymptomatic  General: NAD, well appearing  Psych: a&o x 3, normal mood and affect  Eyes: EOMI, no scleral icterus  ENMT: neck supple without masses or thyromegaly, mucus membranes moist  MSK: normal gait, normal ROM in bilateral shoulders  Lymph nodes: no cervical, supraclavicular or axillary lymphadenopathy  Breast: symmetric, moderate size, grade 2 ptosis  Right: deferred.  Left: At 9:00, 11 cm FN, there is a 2 x 2 centimeter erythematous and inflamed subcutaneous nodule.      PROCEDURE: Incision and drainage  Indication: Infected sebaceous cyst  Location: Left breast at 9:00, 11 cm from the nipple  Consent:  The risks, benefits, and alternatives to the procedure were discussed with the patient, who understood and wished to proceed. The risks described included, but were not limited to, bleeding and need for additional procedures.  Description of Procedure: After the patient was positioned supine on the procedure table, I prepped the area with betadine and draped the affected breast skin. I anesthetized the affected skin and subcutaneous tissue with 1% lidocaine with epinephrine. An incision was made with an 11 blade scalpel at the site of maximum fluctuance and an ellipse of skin was removed. Purulent fluid and thick sebaceous material was evacuated. A fluid culture was taken. The wound was gently probed with a cotton swab and then irrigated with normal saline. It was packed with 1/4 inch plain packing tape and covered with gauze. There was minimal bleeding.  Tolerance: The patient tolerated the procedure well.  Disposition: See below.      Assessment & Plan  Assessment:  1.   59 y.o. F with an infected left breast sebaceous cyst.  2.  He has a past history of a right breast excisional biopsy of a radial scar on 2/6/2023 with benign final pathology.    Plan:  -Incision and drainage performed today.  She was instructed on how to change packing daily until her follow-up appointment in 2 weeks.  She will eventually need definitive surgical excision.    Camila Petty MD      CC:  SLOIS Hu MD

## 2024-03-01 LAB
BACTERIA FLD CULT: ABNORMAL
GRAM STN SPEC: ABNORMAL
GRAM STN SPEC: ABNORMAL

## 2024-03-04 LAB — BACTERIA SPEC ANAEROBE CULT: NORMAL

## 2024-03-08 ENCOUNTER — PREP FOR SURGERY (OUTPATIENT)
Dept: OTHER | Facility: HOSPITAL | Age: 59
End: 2024-03-08

## 2024-03-08 ENCOUNTER — OFFICE VISIT (OUTPATIENT)
Dept: SURGERY | Facility: CLINIC | Age: 59
End: 2024-03-08
Payer: COMMERCIAL

## 2024-03-08 VITALS
OXYGEN SATURATION: 97 % | WEIGHT: 179 LBS | HEART RATE: 68 BPM | SYSTOLIC BLOOD PRESSURE: 122 MMHG | DIASTOLIC BLOOD PRESSURE: 78 MMHG | BODY MASS INDEX: 25.62 KG/M2 | HEIGHT: 70 IN

## 2024-03-08 DIAGNOSIS — N60.82 SEBACEOUS CYST OF SKIN OF LEFT BREAST: Primary | ICD-10-CM

## 2024-03-08 NOTE — PROGRESS NOTES
[facility]     Referring Provider: Suze Pierre MD     Chief complaint: Follow up left breast sebaceous cyst     Subjective   HPI:   1/18/2023:   Ms. Mar Bartholomew is a 58 yo woman, seen at the request of Dr. Suze Pierre, for a new diagnosis of a right breast radial scar. She was initially called back after screening mammogram in September for a new right breast focal asymmetry. Diagnostic imaging demonstrated a subtle area of architectural distortion on mammogram without an ultrasound correlate, as well as a separate cluster of complex cysts. The complex cysts were placed in imaging surveillance. The area of architectural distortion was recommended to undergo a breast MRI, which confirmed architectural distortion. She then underwent a stereotactic biopsy which showed a radial scar. Her work-up is detailed in the breast history section below. Prior to the biopsy, she denies any breast lumps, pain, skin changes, or nipple discharge. She has a past history of a benign left breast surgery in 1994. She has a family history of breast cancer in her mother (diagnosed after age 65).     2/22/2023:  She underwent right breast excisional biopsy on 2/6/23. See surgery & pathology details in breast history section below. She has been recovering well and has no complaints.     5/9/2023, Seen by SOLIS Watson:  Patient presenting to the office today for routine follow-up. She has no new breast complaints or concerns. She had a right limited ultrasound on 4/21/2020 that returned as a BI-RADS 3. She needs to have a ameya dx mammo and targeted right breast ultrasound in September to document 1 year of stability.     9/19/2023Seen by SOLIS Watson:  Patient presenting to the office today for routine follow-up. She has no new breast complaints or concerns today. She had a bilateral diagnostic mammogram and right limited ultrasound recently that resulted as BI-RADS 2.     2/28/2024:  When she saw Sha in  September, she had been released from imaging surveillance and was going to follow-up with us as necessary.  About a month ago, she developed a lump on her inner left breast.  It gradually grew in size, became red and painful.  She underwent diagnostic imaging prior to this appointment and ultrasound showed a 1.7 cm intradermal hypoechoic lesion.  She denies any drainage.  She has not been on any antibiotics.    3/8/2024, Interval History:  At her last visit, she underwent incision and drainage of an infected left breast sebaceous cyst.  She has been doing packing changes at home and it has been healing well.  She can barely fit any the packing in the wound anymore.       Breast history/imaging (updated 3/8/2024):    03/02/2021, Screening MMG with Blaise ([facility]):  Scattered fibroglandular densities are seen throughout both breasts in a pattern in which is unchanged. I see no new or dominant masses, areas of architectural distortion or skin thickening. There is no evidence for axillary lymphadenopathy or nipple retraction.  BI-RADS 1: Negative    09/09/2022, Screening MMG with Blaise ([facility]):  Scattered fibroglandular densities. In the posterior one third of the right breast at the 12 o’clock position there is an area of focal asymmetry with suspected architectural distortion. I see no suspicious calcifications in either breasts. There is no evidence for skin thickening, nipple retraction or axillary adenopathy.  BI-RADS 0: Incomplete    10/05/2022, Right Diagnostic MMG & Right Breast US ([facility]):  MMG:  With additional imaging of the area of focal asymmetry with suspected architectural distortion in the posterior one-third of the right breast at the 12 o’clock position there is partial persistence on CC imaging, but no definite distortion is visualized. Benign-appearing calcifications in the region are noted.  US:  At the 11 o’clock position on the order of 3 cm from the nipple there is a 0.8 cm oval circumscribed  hypoechoic mass with low-level internal echoes and a thin internal septation.  No internal vascularity is appreciated. The imaging features suggest a benign septated complex cyst.  In the right breast at the 10 o’clock position on the order 4 cm from the nipple there is a 0.3 cm benign-appearing cyst. At the 10 o’clock position on the order of 2 cm from the nipple there is a 0.5 cm benign-appearing cyst. No other abnormality is appreciated.  IMPRESSION:  1. Six month mammographic follow-up is recommended. Because of the possible presence of subtle architectural distortion without a sonographic correlate, correlation with a breast MRI without and with contrast is also suggested.   2. Six month sonographic follow up is recommended.  BI-RADS 3: Probably Benign    11/04/2022, Bilateral Breast MRI ( [facility]):  Right Breast:  No suspicious enhancing mass or area of non-mass enhancement is identified. However, in the middle retro areolar right breast, there is a slightly distorted appearance of the breast parenchyma, which is suspicious. This corresponds to the area of questionable distortion on mammogram. The visualized axilla is within normal limits.  Left Breast:  No suspicious enhancing mass or area of non-mass enhancement is identified. The visualized axilla is within normal limits.  Extra mammary Findings:  There are no pathologically enlarged internal mammary chain lymph nodes on either side. In the upper sternum, there is a 1.0 x 0.6 x 0.7 cm T2 hyper intense enhancing bone lesion, which is incompletely characterized.    11/25/2022, Right Breast, Stereotactic Biopsy ( [facility]):  1. Breast, Right 12 O'clock Position:                A. Radial scar formation.                B. Clustered cysts with apocrine metaplasia and focal cyst rupture.               C. Mild ductal hyperplasia of the usual type.               D. Fibroadenomatoid change.               E. Sclerosing adenosis.               F. Focal periductal chronic  inflammation.               G. Negative for atypia, in situ and invasive malignancy.   -A barbell clip. Post procedural digital mammographic views of the right breast demonstrate the barbell clip at the medial aspect of the biopsy cavity, likely displaced approximately 1.5 cm medial to the targeted area.    2/6/2023, Right breast needle-localized excisional biopsy:  1. Right Breast, Needle-Localized Excisional Biopsy (7 grams):               A. Negative for residual radial scar.  B. Clip and biopsy site changes present.  C. Breast parenchyma with usual ductal hyperplasia and apocrine metaplasia.      4/21/2023, Right Breast US ( [facility]):  At 11:00, 3 cm from the nipple, there is a 0.7 x 0.3 x 0.6 cm oval hypoechoic mass, which is not significantly changed from  10/05/2022, previously 0.8 x 0.3 x 0.4 cm. This remains probably benign.  A new adjacent cystic collection is identified deep to an area of surgical scarring and is in the region of interval excisional biopsy.  This finding is consistent with a benign postoperative collection.   BI-RADS 3: Probably benign.    9/12/2023, Bilateral Diagnostic MMG with [facility] & [facility]):  MMG:  There is scattered fibroglandular density which is symmetric. The patient has had previous right breast surgery since the mammogram dated 09/09/2022 for removal of a radial scar. There is no significant postsurgical scarring. There are no findings in either breast to suggest malignancy.  US:  The limited directed right breast ultrasound again demonstrates a small hypoechoic nodule located at 11 o'clock 3 cm from the nipple and measuring 8 x 3 x 6 mm. There is no internal vascularity or posterior shadowing and this shows no significant change from 04/21/2023. An adjacent small cyst appears smaller on today's exam.  BI-RADS 2: Benign findings.    2/26/2024, Left Diagnostic MMG with Blaise & [facility] Breast [facility]):  MMG:  A triangular marker is visualized over the left posterior  breast, slightly superior to the posterior nipple line on the MLO projection and located far medially on the CC view. On the full-field CC view, the marker is tangential to the skin. On this CC view, there is a trace of focal skin thickening in the area of the triangular marker and a trace of stranding just deep to the skin. The left breast otherwise is stable when compared with the prior exams. Ultrasound was performed for further evaluation.  US:    Ultrasound demonstrates an oval, elongated, hypoechoic skin-based lesion at the 10 o'clock position, 12 cm from the nipple. There is no blood flow within the lesion although peripheral blood flow is increased consistent with inflammation. The appearance is consistent with a ruptured sebaceous cyst. The lesion measures 1.7 x 0.4 x 1.5 cm. Other than mild inflammation immediately deep to the sebaceous cyst, the underlying breast shows no abnormality.  BI-RADS 2: Benign.    2/28/2024, Wound culture:  Body Fluid Culture: Rare Proteus mirabilis       Review of Systems:  See interval history.      Medications:    Current Outpatient Medications:     levothyroxine (SYNTHROID, LEVOTHROID) 112 MCG tablet, Take 1 tablet by mouth Daily. Takes brand name synthroid, Disp: , Rfl:     Multiple Vitamins-Minerals (EMERGEN-C IMMUNE PO), Take 1 Units by mouth Daily., Disp: , Rfl:       Allergies:  No Known Allergies      Family History   Problem Relation Age of Onset    Breast cancer Mother         Diagnosed over age 65    Lung cancer Mother     Colon cancer Father 65    No Known Problems Sister     No Known Problems Brother     No Known Problems Daughter     No Known Problems Son     No Known Problems Maternal Aunt     No Known Problems Paternal Aunt     No Known Problems Maternal Grandmother     No Known Problems Paternal Grandmother     BRCA 1/2 Neg Hx     Endometrial cancer Neg Hx     Ovarian cancer Neg Hx     Uterine cancer Neg Hx     Malig Hyperthermia Neg Hx        Objective    PHYSICAL EXAMINATION:   Vitals:    03/08/24 1146   BP: 122/78   Pulse: 68   SpO2: 97%   ECOG 0 - Asymptomatic  General: NAD, well appearing  Psych: a&o x 3, normal mood and affect  Eyes: EOMI, no scleral icterus  ENMT: neck supple without masses or thyromegaly, mucus membranes moist  MSK: normal gait, normal ROM in bilateral shoulders  Lymph nodes: no cervical, supraclavicular or axillary lymphadenopathy  Breast: symmetric, moderate size, grade 2 ptosis  Right: deferred.  Left: At 9:00, 11 cm FN, there is a healing I&D site with healthy granulation tissue.  This measures about 2 x 2 mm.        Assessment & Plan   Assessment:  1.  59 y.o. F sp incision and drainage of an infected left breast sebaceous cyst.  2.  She has a past history of a right breast excisional biopsy of a radial scar on 2/6/2023 with benign final pathology.    Plan:  -She can stop doing packing changes and just apply Vaseline to the granulation tissue daily until it scabs over.  Definitive surgical excision after she returns from vacation.    Camila Petty MD      CC:  SOLIS Hu MD

## 2024-03-08 NOTE — LETTER
March 8, 2024       No Recipients    Patient: Mar Bartholomew   YOB: 1965   Date of Visit: 3/8/2024     Dear SOLIS Hu:       Thank you for referring Mar Bartholomew to me for evaluation. Below are the relevant portions of my assessment and plan of care.    If you have questions, please do not hesitate to call me. I look forward to following Mar along with you.         Sincerely,        Camila Petty MD        CC:   No Recipients    Camila Petty MD  03/08/24 1201  Sign when Signing Visit  [facility]     Referring Provider: Suze Pierre MD     Chief complaint: Follow up left breast sebaceous cyst     Subjective  HPI:   1/18/2023:   Ms. Mar Bartholomew is a 58 yo woman, seen at the request of Dr. Suze Pierre, for a new diagnosis of a right breast radial scar. She was initially called back after screening mammogram in September for a new right breast focal asymmetry. Diagnostic imaging demonstrated a subtle area of architectural distortion on mammogram without an ultrasound correlate, as well as a separate cluster of complex cysts. The complex cysts were placed in imaging surveillance. The area of architectural distortion was recommended to undergo a breast MRI, which confirmed architectural distortion. She then underwent a stereotactic biopsy which showed a radial scar. Her work-up is detailed in the breast history section below. Prior to the biopsy, she denies any breast lumps, pain, skin changes, or nipple discharge. She has a past history of a benign left breast surgery in 1994. She has a family history of breast cancer in her mother (diagnosed after age 65).     2/22/2023:  She underwent right breast excisional biopsy on 2/6/23. See surgery & pathology details in breast history section below. She has been recovering well and has no complaints.     5/9/2023, Seen by SOLIS Watson:  Patient presenting to the office today for routine follow-up. She has no  new breast complaints or concerns. She had a right limited ultrasound on 4/21/2020 that returned as a BI-RADS 3. She needs to have a ameya dx mammo and targeted right breast ultrasound in September to document 1 year of stability.     9/19/2023Seen by SOLIS Watson:  Patient presenting to the office today for routine follow-up. She has no new breast complaints or concerns today. She had a bilateral diagnostic mammogram and right limited ultrasound recently that resulted as BI-RADS 2.     2/28/2024:  When she saw Sha in September, she had been released from imaging surveillance and was going to follow-up with us as necessary.  About a month ago, she developed a lump on her inner left breast.  It gradually grew in size, became red and painful.  She underwent diagnostic imaging prior to this appointment and ultrasound showed a 1.7 cm intradermal hypoechoic lesion.  She denies any drainage.  She has not been on any antibiotics.    3/8/2024, Interval History:  At her last visit, she underwent incision and drainage of an infected left breast sebaceous cyst.  She has been doing packing changes at home and it has been healing well.  She can barely fit any the packing in the wound anymore.       Breast history/imaging (updated 3/8/2024):    03/02/2021, Screening MMG with Blaise ( [facility]):  Scattered fibroglandular densities are seen throughout both breasts in a pattern in which is unchanged. I see no new or dominant masses, areas of architectural distortion or skin thickening. There is no evidence for axillary lymphadenopathy or nipple retraction.  BI-RADS 1: Negative    09/09/2022, Screening MMG with Blaise ( [facility]):  Scattered fibroglandular densities. In the posterior one third of the right breast at the 12 o’clock position there is an area of focal asymmetry with suspected architectural distortion. I see no suspicious calcifications in either breasts. There is no evidence for skin thickening, nipple retraction or axillary  adenopathy.  BI-RADS 0: Incomplete    10/05/2022, Right Diagnostic [facility] & [facility]):  MMG:  With additional imaging of the area of focal asymmetry with suspected architectural distortion in the posterior one-third of the right breast at the 12 o’clock position there is partial persistence on CC imaging, but no definite distortion is visualized. Benign-appearing calcifications in the region are noted.  US:  At the 11 o’clock position on the order of 3 cm from the nipple there is a 0.8 cm oval circumscribed hypoechoic mass with low-level internal echoes and a thin internal septation.  No internal vascularity is appreciated. The imaging features suggest a benign septated complex cyst.  In the right breast at the 10 o’clock position on the order 4 cm from the nipple there is a 0.3 cm benign-appearing cyst. At the 10 o’clock position on the order of 2 cm from the nipple there is a 0.5 cm benign-appearing cyst. No other abnormality is appreciated.  IMPRESSION:  1. Six month mammographic follow-up is recommended. Because of the possible presence of subtle architectural distortion without a sonographic correlate, correlation with a breast MRI without and with contrast is also suggested.   2. Six month sonographic follow up is recommended.  BI-RADS 3: Probably Benign    11/04/2022, Bilateral Breast MRI ( [facility]):  Right Breast:  No suspicious enhancing mass or area of non-mass enhancement is identified. However, in the middle retro areolar right breast, there is a slightly distorted appearance of the breast parenchyma, which is suspicious. This corresponds to the area of questionable distortion on mammogram. The visualized axilla is within normal limits.  Left Breast:  No suspicious enhancing mass or area of non-mass enhancement is identified. The visualized axilla is within normal limits.  Extra mammary Findings:  There are no pathologically enlarged internal mammary chain lymph nodes on either side. In the  upper sternum, there is a 1.0 x 0.6 x 0.7 cm T2 hyper intense enhancing bone lesion, which is incompletely characterized.    11/25/2022, Right Breast[facility]):  1. Breast, Right 12 O'clock Position:                A. Radial scar formation.                B. Clustered cysts with apocrine metaplasia and focal cyst rupture.               C. Mild ductal hyperplasia of the usual type.               D. Fibroadenomatoid change.               E. Sclerosing adenosis.               F. Focal periductal chronic inflammation.               G. Negative for atypia, in situ and invasive malignancy.   -A barbell clip. Post procedural digital mammographic views of the right breast demonstrate the barbell clip at the medial aspect of the biopsy cavity, likely displaced approximately 1.5 cm medial to the targeted area.    2/6/2023, Right breast needle-localized excisional biopsy:  1. Right Breast, Needle-Localized Excisional Biopsy (7 grams):               A. Negative for residual radial scar.  B. Clip and biopsy site changes present.  C. Breast parenchyma with usual ductal hyperplasia and apocrine metaplasia.      4/21/2023, Right Breast US ([facility]):  At 11:00, 3 cm from the nipple, there is a 0.7 x 0.3 x 0.6 cm oval hypoechoic mass, which is not significantly changed from  10/05/2022, previously 0.8 x 0.3 x 0.4 cm. This remains probably benign.  A new adjacent cystic collection is identified deep to an area of surgical scarring and is in the region of interval excisional biopsy.  This finding is consistent with a benign postoperative collection.   BI-RADS 3: Probably benign.    9/12/2023, Bilateral Diagnostic MMG with Blaise & Right Breast US ([facility]):  MMG:  There is scattered fibroglandular density which is symmetric. The patient has had previous right breast surgery since the mammogram dated 09/09/2022 for removal of a radial scar. There is no significant postsurgical scarring. There are no findings in either  breast to suggest malignancy.  US:  The limited directed right breast ultrasound again demonstrates a small hypoechoic nodule located at 11 o'clock 3 cm from the nipple and measuring 8 x 3 x 6 mm. There is no internal vascularity or posterior shadowing and this shows no significant change from 04/21/2023. An adjacent small cyst appears smaller on today's exam.  BI-RADS 2: Benign findings.    2/26/2024, Left Diagnostic MMG with [facility]ise & Left Breast US ([facility]):  MMG:  A triangular marker is visualized over the left posterior breast, slightly superior to the posterior nipple line on the MLO projection and located far medially on the CC view. On the full-field CC view, the marker is tangential to the skin. On this CC view, there is a trace of focal skin thickening in the area of the triangular marker and a trace of stranding just deep to the skin. The left breast otherwise is stable when compared with the prior exams. Ultrasound was performed for further evaluation.  US:    Ultrasound demonstrates an oval, elongated, hypoechoic skin-based lesion at the 10 o'clock position, 12 cm from the nipple. There is no blood flow within the lesion although peripheral blood flow is increased consistent with inflammation. The appearance is consistent with a ruptured sebaceous cyst. The lesion measures 1.7 x 0.4 x 1.5 cm. Other than mild inflammation immediately deep to the sebaceous cyst, the underlying breast shows no abnormality.  BI-RADS 2: Benign.    2/28/2024, Wound culture:  Body Fluid Culture: Rare Proteus mirabilis       Review of Systems:  See interval history.      Medications:    Current Outpatient Medications:   •  levothyroxine (SYNTHROID, LEVOTHROID) 112 MCG tablet, Take 1 tablet by mouth Daily. Takes brand name synthroid, Disp: , Rfl:   •  Multiple Vitamins-Minerals (EMERGEN-C IMMUNE PO), Take 1 Units by mouth Daily., Disp: , Rfl:       Allergies:  No Known Allergies      Family History   Problem Relation Age of Onset    • Breast cancer Mother         Diagnosed over age 65   • Lung cancer Mother    • Colon cancer Father 65   • No Known Problems Sister    • No Known Problems Brother    • No Known Problems Daughter    • No Known Problems Son    • No Known Problems Maternal Aunt    • No Known Problems Paternal Aunt    • No Known Problems Maternal Grandmother    • No Known Problems Paternal Grandmother    • BRCA 1/2 Neg Hx    • Endometrial cancer Neg Hx    • Ovarian cancer Neg Hx    • Uterine cancer Neg Hx    • Malig Hyperthermia Neg Hx        Objective  PHYSICAL EXAMINATION:   Vitals:    03/08/24 1146   BP: 122/78   Pulse: 68   SpO2: 97%   ECOG 0 - Asymptomatic  General: NAD, well appearing  Psych: a&o x 3, normal mood and affect  Eyes: EOMI, no scleral icterus  ENMT: neck supple without masses or thyromegaly, mucus membranes moist  MSK: normal gait, normal ROM in bilateral shoulders  Lymph nodes: no cervical, supraclavicular or axillary lymphadenopathy  Breast: symmetric, moderate size, grade 2 ptosis  Right: deferred.  Left: At 9:00, 11 cm FN, there is a healing I&D site with healthy granulation tissue.  This measures about 2 x 2 mm.        Assessment & Plan  Assessment:  1.  59 y.o. F sp incision and drainage of an infected left breast sebaceous cyst.  2.  She has a past history of a right breast excisional biopsy of a radial scar on 2/6/2023 with benign final pathology.    Plan:  -She can stop doing packing changes and just apply Vaseline to the granulation tissue daily until it scabs over.  Definitive surgical excision after she returns from vacation.    Camila Petty MD      CC:  SOLIS Hu MD

## 2024-04-22 ENCOUNTER — PRE-ADMISSION TESTING (OUTPATIENT)
Dept: PREADMISSION TESTING | Facility: HOSPITAL | Age: 59
End: 2024-04-22
Payer: COMMERCIAL

## 2024-04-22 VITALS
RESPIRATION RATE: 18 BRPM | OXYGEN SATURATION: 100 % | TEMPERATURE: 97.7 F | SYSTOLIC BLOOD PRESSURE: 122 MMHG | DIASTOLIC BLOOD PRESSURE: 57 MMHG | HEART RATE: 59 BPM | BODY MASS INDEX: 25.68 KG/M2 | WEIGHT: 183.4 LBS | HEIGHT: 71 IN

## 2024-04-22 LAB
ANION GAP SERPL CALCULATED.3IONS-SCNC: 10.7 MMOL/L (ref 5–15)
BUN SERPL-MCNC: 17 MG/DL (ref 6–20)
BUN/CREAT SERPL: 22.1 (ref 7–25)
CALCIUM SPEC-SCNC: 10.1 MG/DL (ref 8.6–10.5)
CHLORIDE SERPL-SCNC: 105 MMOL/L (ref 98–107)
CO2 SERPL-SCNC: 25.3 MMOL/L (ref 22–29)
CREAT SERPL-MCNC: 0.77 MG/DL (ref 0.57–1)
DEPRECATED RDW RBC AUTO: 44.5 FL (ref 37–54)
EGFRCR SERPLBLD CKD-EPI 2021: 89 ML/MIN/1.73
ERYTHROCYTE [DISTWIDTH] IN BLOOD BY AUTOMATED COUNT: 13.1 % (ref 12.3–15.4)
GLUCOSE SERPL-MCNC: 107 MG/DL (ref 65–99)
HCT VFR BLD AUTO: 40.8 % (ref 34–46.6)
HGB BLD-MCNC: 13.4 G/DL (ref 12–15.9)
MCH RBC QN AUTO: 30.4 PG (ref 26.6–33)
MCHC RBC AUTO-ENTMCNC: 32.8 G/DL (ref 31.5–35.7)
MCV RBC AUTO: 92.5 FL (ref 79–97)
PLATELET # BLD AUTO: 279 10*3/MM3 (ref 140–450)
PMV BLD AUTO: 9.6 FL (ref 6–12)
POTASSIUM SERPL-SCNC: 4.3 MMOL/L (ref 3.5–5.2)
RBC # BLD AUTO: 4.41 10*6/MM3 (ref 3.77–5.28)
SODIUM SERPL-SCNC: 141 MMOL/L (ref 136–145)
WBC NRBC COR # BLD AUTO: 3.86 10*3/MM3 (ref 3.4–10.8)

## 2024-04-22 PROCEDURE — 85027 COMPLETE CBC AUTOMATED: CPT

## 2024-04-22 PROCEDURE — 36415 COLL VENOUS BLD VENIPUNCTURE: CPT

## 2024-04-22 PROCEDURE — 80048 BASIC METABOLIC PNL TOTAL CA: CPT

## 2024-04-22 RX ORDER — LEVOTHYROXINE SODIUM 0.12 MG/1
125 TABLET ORAL EVERY OTHER DAY
COMMUNITY

## 2024-04-22 NOTE — DISCHARGE INSTRUCTIONS
Take the following medications the morning of surgery:SYNTHROID      If you are on prescription narcotic pain medication to control your pain you may also take that medication the morning of surgery.    General Instructions:  Do not eat solid food after midnight the night before surgery.  You may drink clear liquids day of surgery but must stop at least one hour before your hospital arrival time.  It is beneficial for you to have a clear drink that contains carbohydrates the day of surgery.  We suggest a 12 to 20 ounce bottle of Gatorade or Powerade for non-diabetic patients or a 12 to 20 ounce bottle of G2 or Powerade Zero for diabetic patients. (Pediatric patients, are not advised to drink a 12 to 20 ounce carbohydrate drink)    Clear liquids are liquids you can see through.  Nothing red in color.     Plain water                               Sports drinks  Sodas                                   Gelatin (Jell-O)  Fruit juices without pulp such as white grape juice and apple juice  Popsicles that contain no fruit or yogurt  Tea or coffee (no cream or milk added)  Gatorade / Powerade  G2 / Powerade Zero    Infants may have breast milk up to four hours before surgery.  Infants drinking formula may drink formula up to six hours before surgery.   Patients who avoid smoking, chewing tobacco and alcohol for 4 weeks prior to surgery have a reduced risk of post-operative complications.  Quit smoking as many days before surgery as you can.  Do not smoke, use chewing tobacco or drink alcohol the day of surgery.   If applicable bring your C-PAP/ BI-PAP machine in with you to preop day of surgery.  Bring any papers given to you in the doctor’s office.  Wear clean comfortable clothes.  Do not wear contact lenses, false eyelashes or make-up.  Bring a case for your glasses.   Bring crutches or walker if applicable.  Remove all piercings.  Leave jewelry and any other valuables at home.  Hair extensions with metal clips must be  removed prior to surgery.  The Pre-Admission Testing nurse will instruct you to bring medications if unable to obtain an accurate list in Pre-Admission Testing.        If you were given a blood bank ID arm band remember to bring it with you the day of surgery.    Preventing a Surgical Site Infection:  For 2 to 3 days before surgery, avoid shaving with a razor because the razor can irritate skin and make it easier to develop an infection.    Any areas of open skin can increase the risk of a post-operative wound infection by allowing bacteria to enter and travel throughout the body.  Notify your surgeon if you have any skin wounds / rashes even if it is not near the expected surgical site.  The area will need assessed to determine if surgery should be delayed until it is healed.  The night prior to surgery shower using a fresh bar of anti-bacterial soap (such as Dial) and clean washcloth.  Sleep in a clean bed with clean clothing.  Do not allow pets to sleep with you.  Shower on the morning of surgery using a fresh bar of anti-bacterial soap (such as Dial) and clean washcloth.  Dry with a clean towel and dress in clean clothing.  Ask your surgeon if you will be receiving antibiotics prior to surgery.  Make sure you, your family, and all healthcare providers clean their hands with soap and water or an alcohol based hand  before caring for you or your wound.    Day of surgery:  Your arrival time is approximately two hours before your scheduled surgery time.  Upon arrival, a Pre-op nurse and Anesthesiologist will review your health history, obtain vital signs, and answer questions you may have.  The only belongings needed at this time will be a list of your home medications and if applicable your C-PAP/BI-PAP machine.  A Pre-op nurse will start an IV and you may receive medication in preparation for surgery, including something to help you relax.     Please be aware that surgery does come with discomfort.  We  want to make every effort to control your discomfort so please discuss any uncontrolled symptoms with your nurse.   Your doctor will most likely have prescribed pain medications.      If you are going home after surgery you will receive individualized written care instructions before being discharged.  A responsible adult must drive you to and from the hospital on the day of your surgery and ideally stay with you through the night.   .  Discharge prescriptions can be filled by the hospital pharmacy during regular pharmacy hours.  If you are having surgery late in the day/evening your prescription may be e-prescribed to your pharmacy.  Please verify your pharmacy hours or chose a 24 hour pharmacy to avoid not having access to your prescription because your pharmacy has closed for the day.    If you are staying overnight following surgery, you will be transported to your hospital room following the recovery period.  [facility] has all private rooms.    If you have any questions please call Pre-Admission Testing at [phone].  Deductibles and co-payments are collected on the day of service. Please be prepared to pay the required co-pay, deductible or deposit on the day of service as defined by your plan.    Call your surgeon immediately if you experience any of the following symptoms:  Sore Throat  Shortness of Breath or difficulty breathing  Cough  Chills  Body soreness or muscle pain  Headache  Fever  New loss of taste or smell  Do not arrive for your surgery ill.  Your procedure will need to be rescheduled to another time.  You will need to call your physician before the day of surgery to avoid any unnecessary exposure to hospital staff as well as other patients.

## 2024-04-23 ENCOUNTER — HOSPITAL ENCOUNTER (OUTPATIENT)
Facility: HOSPITAL | Age: 59
Setting detail: HOSPITAL OUTPATIENT SURGERY
Discharge: HOME | End: 2024-04-23
Attending: SURGERY | Admitting: SURGERY
Payer: COMMERCIAL

## 2024-04-23 ENCOUNTER — ANESTHESIA EVENT (OUTPATIENT)
Dept: PERIOP | Facility: HOSPITAL | Age: 59
End: 2024-04-23
Payer: COMMERCIAL

## 2024-04-23 ENCOUNTER — ANESTHESIA (OUTPATIENT)
Dept: PERIOP | Facility: HOSPITAL | Age: 59
End: 2024-04-23
Payer: COMMERCIAL

## 2024-04-23 VITALS
DIASTOLIC BLOOD PRESSURE: 67 MMHG | TEMPERATURE: 98.3 F | HEART RATE: 44 BPM | OXYGEN SATURATION: 100 % | RESPIRATION RATE: 18 BRPM | SYSTOLIC BLOOD PRESSURE: 109 MMHG

## 2024-04-23 DIAGNOSIS — N60.82 SEBACEOUS CYST OF SKIN OF LEFT BREAST: ICD-10-CM

## 2024-04-23 PROCEDURE — 88304 TISSUE EXAM BY PATHOLOGIST: CPT | Performed by: SURGERY

## 2024-04-23 PROCEDURE — 25010000002 BUPIVACAINE (PF) 0.25 % SOLUTION 30 ML VIAL: Performed by: SURGERY

## 2024-04-23 PROCEDURE — 11402 EXC TR-EXT B9+MARG 1.1-2 CM: CPT | Performed by: SURGERY

## 2024-04-23 PROCEDURE — 25010000002 GLYCOPYRROLATE 0.2 MG/ML SOLUTION: Performed by: NURSE ANESTHETIST, CERTIFIED REGISTERED

## 2024-04-23 PROCEDURE — 25810000003 LACTATED RINGERS PER 1000 ML: Performed by: NURSE ANESTHETIST, CERTIFIED REGISTERED

## 2024-04-23 PROCEDURE — 25010000002 CEFAZOLIN PER 500 MG: Performed by: SURGERY

## 2024-04-23 PROCEDURE — S0260 H&P FOR SURGERY: HCPCS | Performed by: SURGERY

## 2024-04-23 PROCEDURE — 25010000002 ONDANSETRON PER 1 MG: Performed by: NURSE ANESTHETIST, CERTIFIED REGISTERED

## 2024-04-23 PROCEDURE — 25010000002 MIDAZOLAM PER 1 MG: Performed by: ANESTHESIOLOGY

## 2024-04-23 PROCEDURE — 25010000002 DEXAMETHASONE SODIUM PHOSPHATE 20 MG/5ML SOLUTION: Performed by: NURSE ANESTHETIST, CERTIFIED REGISTERED

## 2024-04-23 PROCEDURE — 25010000002 PROPOFOL 200 MG/20ML EMULSION: Performed by: NURSE ANESTHETIST, CERTIFIED REGISTERED

## 2024-04-23 PROCEDURE — 25810000003 LACTATED RINGERS PER 1000 ML: Performed by: ANESTHESIOLOGY

## 2024-04-23 PROCEDURE — 25010000002 FENTANYL CITRATE (PF) 50 MCG/ML SOLUTION: Performed by: NURSE ANESTHETIST, CERTIFIED REGISTERED

## 2024-04-23 PROCEDURE — 25010000002 PROPOFOL 10 MG/ML EMULSION: Performed by: NURSE ANESTHETIST, CERTIFIED REGISTERED

## 2024-04-23 RX ORDER — LIDOCAINE HYDROCHLORIDE 10 MG/ML
0.5 INJECTION, SOLUTION INFILTRATION; PERINEURAL ONCE AS NEEDED
Status: DISCONTINUED | OUTPATIENT
Start: 2024-04-23 | End: 2024-04-23 | Stop reason: HOSPADM

## 2024-04-23 RX ORDER — SODIUM CHLORIDE 0.9 % (FLUSH) 0.9 %
3 SYRINGE (ML) INJECTION EVERY 12 HOURS SCHEDULED
Status: DISCONTINUED | OUTPATIENT
Start: 2024-04-23 | End: 2024-04-23 | Stop reason: HOSPADM

## 2024-04-23 RX ORDER — SODIUM CHLORIDE, SODIUM LACTATE, POTASSIUM CHLORIDE, CALCIUM CHLORIDE 600; 310; 30; 20 MG/100ML; MG/100ML; MG/100ML; MG/100ML
9 INJECTION, SOLUTION INTRAVENOUS CONTINUOUS
Status: DISCONTINUED | OUTPATIENT
Start: 2024-04-23 | End: 2024-04-23 | Stop reason: HOSPADM

## 2024-04-23 RX ORDER — ONDANSETRON 2 MG/ML
4 INJECTION INTRAMUSCULAR; INTRAVENOUS ONCE AS NEEDED
Status: DISCONTINUED | OUTPATIENT
Start: 2024-04-23 | End: 2024-04-23 | Stop reason: HOSPADM

## 2024-04-23 RX ORDER — OXYCODONE AND ACETAMINOPHEN 7.5; 325 MG/1; MG/1
1 TABLET ORAL EVERY 4 HOURS PRN
Status: DISCONTINUED | OUTPATIENT
Start: 2024-04-23 | End: 2024-04-23 | Stop reason: HOSPADM

## 2024-04-23 RX ORDER — EPHEDRINE SULFATE 50 MG/ML
5 INJECTION, SOLUTION INTRAVENOUS ONCE AS NEEDED
Status: DISCONTINUED | OUTPATIENT
Start: 2024-04-23 | End: 2024-04-23 | Stop reason: HOSPADM

## 2024-04-23 RX ORDER — FAMOTIDINE 10 MG/ML
20 INJECTION, SOLUTION INTRAVENOUS ONCE
Status: COMPLETED | OUTPATIENT
Start: 2024-04-23 | End: 2024-04-23

## 2024-04-23 RX ORDER — HYDROCODONE BITARTRATE AND ACETAMINOPHEN 5; 325 MG/1; MG/1
1 TABLET ORAL ONCE AS NEEDED
Status: DISCONTINUED | OUTPATIENT
Start: 2024-04-23 | End: 2024-04-23 | Stop reason: HOSPADM

## 2024-04-23 RX ORDER — SODIUM CHLORIDE 0.9 % (FLUSH) 0.9 %
3-10 SYRINGE (ML) INJECTION AS NEEDED
Status: DISCONTINUED | OUTPATIENT
Start: 2024-04-23 | End: 2024-04-23 | Stop reason: HOSPADM

## 2024-04-23 RX ORDER — FENTANYL CITRATE 50 UG/ML
50 INJECTION, SOLUTION INTRAMUSCULAR; INTRAVENOUS ONCE AS NEEDED
Status: DISCONTINUED | OUTPATIENT
Start: 2024-04-23 | End: 2024-04-23 | Stop reason: HOSPADM

## 2024-04-23 RX ORDER — FENTANYL CITRATE 50 UG/ML
INJECTION, SOLUTION INTRAMUSCULAR; INTRAVENOUS AS NEEDED
Status: DISCONTINUED | OUTPATIENT
Start: 2024-04-23 | End: 2024-04-23 | Stop reason: SURG

## 2024-04-23 RX ORDER — HYDROMORPHONE HYDROCHLORIDE 1 MG/ML
0.5 INJECTION, SOLUTION INTRAMUSCULAR; INTRAVENOUS; SUBCUTANEOUS
Status: DISCONTINUED | OUTPATIENT
Start: 2024-04-23 | End: 2024-04-23 | Stop reason: HOSPADM

## 2024-04-23 RX ORDER — NALOXONE HCL 0.4 MG/ML
0.2 VIAL (ML) INJECTION AS NEEDED
Status: DISCONTINUED | OUTPATIENT
Start: 2024-04-23 | End: 2024-04-23 | Stop reason: HOSPADM

## 2024-04-23 RX ORDER — PROMETHAZINE HYDROCHLORIDE 25 MG/1
25 SUPPOSITORY RECTAL ONCE AS NEEDED
Status: DISCONTINUED | OUTPATIENT
Start: 2024-04-23 | End: 2024-04-23 | Stop reason: HOSPADM

## 2024-04-23 RX ORDER — LABETALOL HYDROCHLORIDE 5 MG/ML
5 INJECTION, SOLUTION INTRAVENOUS
Status: DISCONTINUED | OUTPATIENT
Start: 2024-04-23 | End: 2024-04-23 | Stop reason: HOSPADM

## 2024-04-23 RX ORDER — FENTANYL CITRATE 50 UG/ML
50 INJECTION, SOLUTION INTRAMUSCULAR; INTRAVENOUS
Status: DISCONTINUED | OUTPATIENT
Start: 2024-04-23 | End: 2024-04-23 | Stop reason: HOSPADM

## 2024-04-23 RX ORDER — PROPOFOL 10 MG/ML
INJECTION, EMULSION INTRAVENOUS AS NEEDED
Status: DISCONTINUED | OUTPATIENT
Start: 2024-04-23 | End: 2024-04-23 | Stop reason: SURG

## 2024-04-23 RX ORDER — DIPHENHYDRAMINE HYDROCHLORIDE 50 MG/ML
12.5 INJECTION INTRAMUSCULAR; INTRAVENOUS
Status: DISCONTINUED | OUTPATIENT
Start: 2024-04-23 | End: 2024-04-23 | Stop reason: HOSPADM

## 2024-04-23 RX ORDER — DROPERIDOL 2.5 MG/ML
0.62 INJECTION, SOLUTION INTRAMUSCULAR; INTRAVENOUS
Status: DISCONTINUED | OUTPATIENT
Start: 2024-04-23 | End: 2024-04-23 | Stop reason: HOSPADM

## 2024-04-23 RX ORDER — IPRATROPIUM BROMIDE AND ALBUTEROL SULFATE 2.5; .5 MG/3ML; MG/3ML
3 SOLUTION RESPIRATORY (INHALATION) ONCE AS NEEDED
Status: DISCONTINUED | OUTPATIENT
Start: 2024-04-23 | End: 2024-04-23 | Stop reason: HOSPADM

## 2024-04-23 RX ORDER — GLYCOPYRROLATE 0.2 MG/ML
INJECTION INTRAMUSCULAR; INTRAVENOUS AS NEEDED
Status: DISCONTINUED | OUTPATIENT
Start: 2024-04-23 | End: 2024-04-23 | Stop reason: SURG

## 2024-04-23 RX ORDER — FLUMAZENIL 0.1 MG/ML
0.2 INJECTION INTRAVENOUS AS NEEDED
Status: DISCONTINUED | OUTPATIENT
Start: 2024-04-23 | End: 2024-04-23 | Stop reason: HOSPADM

## 2024-04-23 RX ORDER — ONDANSETRON 2 MG/ML
INJECTION INTRAMUSCULAR; INTRAVENOUS AS NEEDED
Status: DISCONTINUED | OUTPATIENT
Start: 2024-04-23 | End: 2024-04-23 | Stop reason: SURG

## 2024-04-23 RX ORDER — MIDAZOLAM HYDROCHLORIDE 1 MG/ML
1 INJECTION INTRAMUSCULAR; INTRAVENOUS
Status: DISCONTINUED | OUTPATIENT
Start: 2024-04-23 | End: 2024-04-23 | Stop reason: HOSPADM

## 2024-04-23 RX ORDER — SODIUM CHLORIDE, SODIUM LACTATE, POTASSIUM CHLORIDE, CALCIUM CHLORIDE 600; 310; 30; 20 MG/100ML; MG/100ML; MG/100ML; MG/100ML
INJECTION, SOLUTION INTRAVENOUS CONTINUOUS PRN
Status: DISCONTINUED | OUTPATIENT
Start: 2024-04-23 | End: 2024-04-23 | Stop reason: SURG

## 2024-04-23 RX ORDER — LIDOCAINE HYDROCHLORIDE 20 MG/ML
INJECTION, SOLUTION INFILTRATION; PERINEURAL AS NEEDED
Status: DISCONTINUED | OUTPATIENT
Start: 2024-04-23 | End: 2024-04-23 | Stop reason: SURG

## 2024-04-23 RX ORDER — DEXAMETHASONE SODIUM PHOSPHATE 4 MG/ML
INJECTION, SOLUTION INTRA-ARTICULAR; INTRALESIONAL; INTRAMUSCULAR; INTRAVENOUS; SOFT TISSUE AS NEEDED
Status: DISCONTINUED | OUTPATIENT
Start: 2024-04-23 | End: 2024-04-23 | Stop reason: SURG

## 2024-04-23 RX ORDER — PROMETHAZINE HYDROCHLORIDE 25 MG/1
25 TABLET ORAL ONCE AS NEEDED
Status: DISCONTINUED | OUTPATIENT
Start: 2024-04-23 | End: 2024-04-23 | Stop reason: HOSPADM

## 2024-04-23 RX ORDER — HYDRALAZINE HYDROCHLORIDE 20 MG/ML
5 INJECTION INTRAMUSCULAR; INTRAVENOUS
Status: DISCONTINUED | OUTPATIENT
Start: 2024-04-23 | End: 2024-04-23 | Stop reason: HOSPADM

## 2024-04-23 RX ADMIN — MIDAZOLAM 1 MG: 1 INJECTION INTRAMUSCULAR; INTRAVENOUS at 07:54

## 2024-04-23 RX ADMIN — PROPOFOL 150 MG: 10 INJECTION, EMULSION INTRAVENOUS at 08:08

## 2024-04-23 RX ADMIN — ONDANSETRON 4 MG: 2 INJECTION INTRAMUSCULAR; INTRAVENOUS at 08:15

## 2024-04-23 RX ADMIN — FENTANYL CITRATE 25 MCG: 50 INJECTION, SOLUTION INTRAMUSCULAR; INTRAVENOUS at 08:08

## 2024-04-23 RX ADMIN — SODIUM CHLORIDE, POTASSIUM CHLORIDE, SODIUM LACTATE AND CALCIUM CHLORIDE 9 ML/HR: 600; 310; 30; 20 INJECTION, SOLUTION INTRAVENOUS at 07:55

## 2024-04-23 RX ADMIN — GLYCOPYRROLATE 0.1 MG: 0.2 INJECTION INTRAMUSCULAR; INTRAVENOUS at 08:08

## 2024-04-23 RX ADMIN — PROPOFOL 150 MG: 10 INJECTION, EMULSION INTRAVENOUS at 08:14

## 2024-04-23 RX ADMIN — SODIUM CHLORIDE 2000 MG: 900 INJECTION INTRAVENOUS at 07:54

## 2024-04-23 RX ADMIN — SODIUM CHLORIDE, POTASSIUM CHLORIDE, SODIUM LACTATE AND CALCIUM CHLORIDE: 600; 310; 30; 20 INJECTION, SOLUTION INTRAVENOUS at 07:59

## 2024-04-23 RX ADMIN — PROPOFOL 150 MCG/KG/MIN: 10 INJECTION, EMULSION INTRAVENOUS at 08:14

## 2024-04-23 RX ADMIN — DEXAMETHASONE SODIUM PHOSPHATE 8 MG: 4 INJECTION, SOLUTION INTRAMUSCULAR; INTRAVENOUS at 08:15

## 2024-04-23 RX ADMIN — PROPOFOL 50 MG: 10 INJECTION, EMULSION INTRAVENOUS at 08:23

## 2024-04-23 RX ADMIN — LIDOCAINE HYDROCHLORIDE 60 MG: 20 INJECTION, SOLUTION INFILTRATION; PERINEURAL at 08:08

## 2024-04-23 RX ADMIN — FAMOTIDINE 20 MG: 10 INJECTION INTRAVENOUS at 07:54

## 2024-04-23 NOTE — ANESTHESIA POSTPROCEDURE EVALUATION
Patient: Mar Bartholomew    Procedure Summary       Date: 04/23/24 Room / Location: [facility] OR  / [facility] MAIN OR    Anesthesia Start: 0759 Anesthesia Stop: 0843    Procedure: Left breast skin cyst excision (Left: Breast) Diagnosis:       Sebaceous cyst of skin of left breast      (Sebaceous cyst of skin of left breast [N60.82])    Surgeons: Camila Petty MD Provider: Kirt Jhaveri MD    Anesthesia Type: general ASA Status: 2            Anesthesia Type: general    Vitals  Vitals Value Taken Time   /66 04/23/24 0915   Temp     Pulse 43 04/23/24 0922   Resp 13 04/23/24 0900   SpO2 95 % 04/23/24 0922   Vitals shown include unfiled device data.        Post Anesthesia Care and Evaluation    Patient location during evaluation: bedside  Patient participation: complete - patient participated  Level of consciousness: awake and alert  Pain management: adequate    Airway patency: patent  Anesthetic complications: No anesthetic complications    Cardiovascular status: acceptable  Respiratory status: acceptable  Hydration status: acceptable    Comments: /67   Pulse (!) 44   Temp 36.8 °C (98.3 °F)   Resp 18   LMP  (LMP Unknown)   SpO2 100%

## 2024-04-23 NOTE — ANESTHESIA PREPROCEDURE EVALUATION
Anesthesia Evaluation     no history of anesthetic complications:                Airway   Mallampati: I  TM distance: >3 FB  Neck ROM: full  Dental      Comment: Chipped upper front tooth    Pulmonary    (-) shortness of breath, recent URI, not a smoker  Cardiovascular     (-) dysrhythmias, angina      Neuro/Psych  (-) dizziness/light headedness, syncope  GI/Hepatic/Renal/Endo    (+) thyroid problem   (-) liver disease, no renal disease, diabetes    Musculoskeletal     Abdominal    Substance History      OB/GYN          Other                          Anesthesia Plan    ASA 2     general     intravenous induction     Anesthetic plan, risks, benefits, and alternatives have been provided, discussed and informed consent has been obtained with: patient.        CODE STATUS:

## 2024-04-23 NOTE — H&P
BREAST SURGERY HISTORY & PHYSICAL        Chief complaint: Left breast sebaceous cyst        Subjective  HPI:   1/18/2023:   Ms. Mar Bartholomew is a 56 yo woman, seen at the request of Dr. Suze Pierre, for a new diagnosis of a right breast radial scar. She was initially called back after screening mammogram in September for a new right breast focal asymmetry. Diagnostic imaging demonstrated a subtle area of architectural distortion on mammogram without an ultrasound correlate, as well as a separate cluster of complex cysts. The complex cysts were placed in imaging surveillance. The area of architectural distortion was recommended to undergo a breast MRI, which confirmed architectural distortion. She then underwent a stereotactic biopsy which showed a radial scar. Her work-up is detailed in the breast history section below. Prior to the biopsy, she denies any breast lumps, pain, skin changes, or nipple discharge. She has a past history of a benign left breast surgery in 1994. She has a family history of breast cancer in her mother (diagnosed after age 65).      2/22/2023:  She underwent right breast excisional biopsy on 2/6/23. See surgery & pathology details in breast history section below. She has been recovering well and has no complaints.      5/9/2023, Seen by SOLIS Watson:  Patient presenting to the office today for routine follow-up. She has no new breast complaints or concerns. She had a right limited ultrasound on 4/21/2020 that returned as a BI-RADS 3. She needs to have a maeya dx mammo and targeted right breast ultrasound in September to document 1 year of stability.      9/19/2023Seen by SOLIS Watson:  Patient presenting to the office today for routine follow-up. She has no new breast complaints or concerns today. She had a bilateral diagnostic mammogram and right limited ultrasound recently that resulted as BI-RADS 2.      2/28/2024:  When she saw Sha in September, she had been released from  imaging surveillance and was going to follow-up with us as necessary.  About a month ago, she developed a lump on her inner left breast.  It gradually grew in size, became red and painful.  She underwent diagnostic imaging prior to this appointment and ultrasound showed a 1.7 cm intradermal hypoechoic lesion.  She denies any drainage.  She has not been on any antibiotics.     3/8/2024:  At her last visit, she underwent incision and drainage of an infected left breast sebaceous cyst.  She has been doing packing changes at home and it has been healing well.  She can barely fit any the packing in the wound anymore.    4/23/2024, Interval History:  She presents today for surgery.  She denies any new complaints.       Breast history/imaging (updated 3/8/2024):     03/02/2021, Screening MMG with [facility] ( [facility]):  Scattered fibroglandular densities are seen throughout both breasts in a pattern in which is unchanged. I see no new or dominant masses, areas of architectural distortion or skin thickening. There is no evidence for axillary lymphadenopathy or nipple retraction.  BI-RADS 1: Negative     09/09/2022, Screening MMG with [facility] ( [facility]):  Scattered fibroglandular densities. In the posterior one third of the right breast at the 12 o’clock position there is an area of focal asymmetry with suspected architectural distortion. I see no suspicious calcifications in either breasts. There is no evidence for skin thickening, nipple retraction or axillary adenopathy.  BI-RADS 0: Incomplete     10/05/2022, Right Diagnostic MMG & Right Breast US ( [facility]):  MMG:  With additional imaging of the area of focal asymmetry with suspected architectural distortion in the posterior one-third of the right breast at the 12 o’clock position there is partial persistence on CC imaging, but no definite distortion is visualized. Benign-appearing calcifications in the region are noted.  US:  At the 11 o’clock position on the order of 3 cm from the  nipple there is a 0.8 cm oval circumscribed hypoechoic mass with low-level internal echoes and a thin internal septation.  No internal vascularity is appreciated. The imaging features suggest a benign septated complex cyst.  In the right breast at the 10 o’clock position on the order 4 cm from the nipple there is a 0.3 cm benign-appearing cyst. At the 10 o’clock position on the order of 2 cm from the nipple there is a 0.5 cm benign-appearing cyst. No other abnormality is appreciated.  IMPRESSION:  1. Six month mammographic follow-up is recommended. Because of the possible presence of subtle architectural distortion without a sonographic correlate, correlation with a breast MRI without and with contrast is also suggested.   2. Six month sonographic follow up is recommended.  BI-RADS 3: Probably Benign     [facility]20[facility]  Right Breast:  No suspicious enhancing mass or area of non-mass enhancement is identified. However, in the middle retro areolar right breast, there is a slightly distorted appearance of the breast parenchyma, which is suspicious. This corresponds to the area of questionable distortion on mammogram. The visualized axilla is within normal limits.  Left Breast:  No suspicious enhancing mass or area of non-mass enhancement is identified. The visualized axilla is within normal limits.  Extra mammary Findings:  There are no pathologically enlarged internal mammary chain lymph nodes on either side. In the upper sternum, there is a 1.0 x 0.6 x 0.7 cm T2 hyper intense enhancing bone lesion, which is incompletely characterized.     11/25/2022, Right Breast, Stereotactic [facility]opsy [facility]  1. Breast, Right 12 O'clock Position:                A. Radial scar formation.                B. Clustered cysts with apocrine metaplasia and focal cyst rupture.               C. Mild ductal hyperplasia of the usual type.               D. Fibroadenomatoid change.               E. Sclerosing  adenosis.               F. Focal periductal chronic inflammation.               G. Negative for atypia, in situ and invasive malignancy.   -A barbell clip. Post procedural digital mammographic views of the right breast demonstrate the barbell clip at the medial aspect of the biopsy cavity, likely displaced approximately 1.5 cm medial to the targeted area.     2/6/2023, Right breast needle-localized excisional biopsy:  1. Right Breast, Needle-Localized Excisional Biopsy (7 grams):               A. Negative for residual radial scar.  B. Clip and biopsy site changes present.  C. Breast parenchyma with usual ductal hyperplasia and apocrine metaplasia.       4/21/2023, Right Breast US ( [facility]):  At 11:00, 3 cm from the nipple, there is a 0.7 x 0.3 x 0.6 cm oval hypoechoic mass, which is not significantly changed from  10/05/2022, previously 0.8 x 0.3 x 0.4 cm. This remains probably benign.  A new adjacent cystic collection is identified deep to an area of surgical scarring and is in the region of interval excisional biopsy.  This finding is consistent with a benign postoperative collection.   BI-RADS 3: Probably benign.     9/12/2023, Bilateral Diagnostic MMG with [facility] & [facility]  MMG:  There is scattered fibroglandular density which is symmetric. The patient has had previous right breast surgery since the mammogram dated 09/09/2022 for removal of a radial scar. There is no significant postsurgical scarring. There are no findings in either breast to suggest malignancy.  US:  The limited directed right breast ultrasound again demonstrates a small hypoechoic nodule located at 11 o'clock 3 cm from the nipple and measuring 8 x 3 x 6 mm. There is no internal vascularity or posterior shadowing and this shows no significant change from 04/21/2023. An adjacent small cyst appears smaller on today's exam.  BI-RADS 2: Benign findings.     2/26/2024, Left Diagnostic MMG with [facility] & Left Breast [facility]):  MMG:  A  triangular marker is visualized over the left posterior breast, slightly superior to the posterior nipple line on the MLO projection and located far medially on the CC view. On the full-field CC view, the marker is tangential to the skin. On this CC view, there is a trace of focal skin thickening in the area of the triangular marker and a trace of stranding just deep to the skin. The left breast otherwise is stable when compared with the prior exams. Ultrasound was performed for further evaluation.  US:    Ultrasound demonstrates an oval, elongated, hypoechoic skin-based lesion at the 10 o'clock position, 12 cm from the nipple. There is no blood flow within the lesion although peripheral blood flow is increased consistent with inflammation. The appearance is consistent with a ruptured sebaceous cyst. The lesion measures 1.7 x 0.4 x 1.5 cm. Other than mild inflammation immediately deep to the sebaceous cyst, the underlying breast shows no abnormality.  BI-RADS 2: Benign.     2/28/2024, Wound culture:  Body Fluid Culture: Rare Proteus mirabilis         Review of Systems:  See interval history.        Medications:    Current Medications      Current Outpatient Medications:     levothyroxine (SYNTHROID, LEVOTHROID) 112 MCG tablet, Take 1 tablet by mouth Daily. Takes brand name synthroid, Disp: , Rfl:     Multiple Vitamins-Minerals (EMERGEN-C IMMUNE PO), Take 1 Units by mouth Daily., Disp: , Rfl:            Allergies:  Allergies   No Known Allergies                 Family History   Problem Relation Age of Onset    Breast cancer Mother           Diagnosed over age 65    Lung cancer Mother      Colon cancer Father 65    No Known Problems Sister      No Known Problems Brother      No Known Problems Daughter      No Known Problems Son      No Known Problems Maternal Aunt      No Known Problems Paternal Aunt      No Known Problems Maternal Grandmother      No Known Problems Paternal Grandmother      BRCA 1/2 Neg Hx       Endometrial cancer Neg Hx      Ovarian cancer Neg Hx      Uterine cancer Neg Hx      Malig Hyperthermia Neg Hx                 Objective  PHYSICAL EXAMINATION:   Vitals:    04/23/24 0639   BP: 136/67   Pulse: 70   Resp: 16   Temp: 98.3 °F (36.8 °C)   SpO2: 100%     ECOG 0 - Asymptomatic  General: NAD, well appearing  Psych: a&o x 3, normal mood and affect  Eyes: EOMI, no scleral icterus  ENMT: neck supple without masses or thyromegaly, mucus membranes moist  MSK: normal gait, normal ROM in bilateral shoulders  Lymph nodes: no cervical, supraclavicular or axillary lymphadenopathy  Breast: symmetric, moderate size, grade 2 ptosis  Right: deferred.  Left: At 9:00, 11 cm FN, there is a healed I&D site with healthy granulation tissue.         Assessment & Plan  Assessment:  1.  59 y.o. F sp incision and drainage of an infected left breast sebaceous cyst.  2.  She has a past history of a right breast excisional biopsy of a radial scar on 2/6/2023 with benign final pathology.     Plan:  -Left breast skin cyst excision     Camila Petty MD    Copied text in this note has been reviewed and is accurate as of 04/23/24.

## 2024-04-23 NOTE — ANESTHESIA PROCEDURE NOTES
Airway  Date/Time: 4/23/2024 8:10 AM    Indications and Patient Condition  Indications for airway management: airway protection    Preoxygenated: yes  MILS maintained throughout  Mask difficulty assessment: 1 - vent by mask    Final Airway Details  Final airway type: supraglottic airway      Successful airway: LMA  Size 4     Number of attempts at approach: 1  Assessment: lips, teeth, and gum same as pre-op and atraumatic intubation

## 2024-04-23 NOTE — OP NOTE
Operative Report    Patient Name:  Mar Bartholomew  YOB: 1965    Date of Surgery:  4/23/2024    Pre-op Diagnosis:   Sebaceous cyst of skin of left breast [N60.82]       Post-Op Diagnosis:  Sebaceous cyst of skin of left breast [N60.82]    Procedure:  Left breast skin cyst excision      Staff:  Surgeon(s):  Camila Petty MD       Anesthesia: General    Estimated Blood Loss:  2 mL    Implants:    Nothing was implanted during the procedure    Specimen:          Specimens       ID Source Type Tests Collected By Collected At Frozen?    A Breast, Left Tissue TISSUE PATHOLOGY EXAM   Camila Petty MD 4/23/24 0825 No    Description: Left breast skin cyst - stitch at 9 o'clock              Findings: Healing incision and drainage site in the left breast at 9:00.    Complications: None     Indications: The patient is a 59 y.o. F sp incision and drainage of an infected left breast sebaceous cyst. She presents today for excision. The risks and benefits were discussed preoperatively and she understood and agreed to proceed.     Description of Procedure:  The patient was identified in the holding room and brought to the operating room and placed supine on the table. Patient verification was performed. Anesthesia was induced. The patient was prepped and draped in the standard sterile fashion. A time out was performed and all were in agreement. I confirmed that the patient had received preoperative antibiotics.      The skin lesion was located at 9:00 on her left breast. Local anesthesia was injected into the surrounding skin and subcutaneous tissue. A elliptical incision was made around the lesion, measuring 2 x 1 cm. This was carried sharply through the dermis until the subcutaneous tissue was reached. The lesion was removed from the underlying subcutaneous tissue with electrocautery. It was oriented with a stitch at 9:00.     The wound was irrigated with sterile water and hemostasis was achieved with  electrocautery. Additional local anesthetic was injected into the wound. The incision was reapproximated with interrupted 3-0 vicryl deep dermal stitches and then closed with a running 4-0 monocryl and covered with dermabond. Counts were correct at the end of the case. The patient was then awakened from anesthesia and taken to the PACU in stable condition.     Camila Petty MD     Date: 4/23/2024  Time: 12:23 EDT

## 2024-04-24 LAB
LAB AP CASE REPORT: NORMAL
PATH REPORT.FINAL DX SPEC: NORMAL
PATH REPORT.GROSS SPEC: NORMAL

## 2024-04-25 ENCOUNTER — TELEPHONE (OUTPATIENT)
Dept: SURGERY | Facility: CLINIC | Age: 59
End: 2024-04-25
Payer: COMMERCIAL

## 2024-04-25 NOTE — TELEPHONE ENCOUNTER
I called patient and let her know the following.  She is healing well with no complaints.     ----- Message from Camila Petty MD sent at 4/24/2024  3:59 PM EDT -----  Please call and let her know that pathology just showed a healing scar area.  Make sure she is doing okay after surgery.

## 2024-05-02 ENCOUNTER — OFFICE VISIT (OUTPATIENT)
Dept: SURGERY | Facility: CLINIC | Age: 59
End: 2024-05-02
Payer: COMMERCIAL

## 2024-05-02 VITALS
HEART RATE: 55 BPM | DIASTOLIC BLOOD PRESSURE: 78 MMHG | OXYGEN SATURATION: 98 % | WEIGHT: 183 LBS | SYSTOLIC BLOOD PRESSURE: 110 MMHG | RESPIRATION RATE: 17 BRPM | BODY MASS INDEX: 25.62 KG/M2 | HEIGHT: 71 IN

## 2024-05-02 DIAGNOSIS — Z48.89 POSTOPERATIVE VISIT: Primary | ICD-10-CM

## 2024-05-02 PROCEDURE — 99024 POSTOP FOLLOW-UP VISIT: CPT | Performed by: SURGERY

## 2024-05-02 NOTE — LETTER
May 2, 2024       No Recipients    Patient: Mar Bartholomew   YOB: 1965   Date of Visit: 5/2/2024     Dear SOLIS Hu:       Thank you for referring Mar Bartholomew to me for evaluation. Below are the relevant portions of my assessment and plan of care.    If you have questions, please do not hesitate to call me. I look forward to following Mar along with you.         Sincerely,        Camila Petty MD        CC:   No Recipients    Camila Petty MD  05/02/24 0828  Sign when Signing Visit  [facility]     Referring Provider: Suze Pierre MD     Chief complaint: Postoperative visit      Subjective  HPI:   1/18/2023:  Ms. Mar Bartholomew is a 56 yo woman, seen at the request of Dr. Suze Pierre, for a new diagnosis of a right breast radial scar. She was initially called back after screening mammogram in September for a new right breast focal asymmetry. Diagnostic imaging demonstrated a subtle area of architectural distortion on mammogram without an ultrasound correlate, as well as a separate cluster of complex cysts. The complex cysts were placed in imaging surveillance. The area of architectural distortion was recommended to undergo a breast MRI, which confirmed architectural distortion. She then underwent a stereotactic biopsy which showed a radial scar. Her work-up is detailed in the breast history section below. Prior to the biopsy, she denies any breast lumps, pain, skin changes, or nipple discharge. She has a past history of a benign left breast surgery in 1994. She has a family history of breast cancer in her mother (diagnosed after age 65).     2/22/2023:  She underwent right breast excisional biopsy on 2/6/23. See surgery & pathology details in breast history section below. She has been recovering well and has no complaints.     5/9/2023, Seen by SOLIS Watson:  Patient presenting to the office today for routine follow-up. She has no new breast  complaints or concerns. She had a right limited ultrasound on 4/21/2020 that returned as a BI-RADS 3. She needs to have a ameya dx mammo and targeted right breast ultrasound in September to document 1 year of stability.     9/19/2023, Seen by SOLIS Watson:  Patient presenting to the office today for routine follow-up. She has no new breast complaints or concerns today. She had a bilateral diagnostic mammogram and right limited ultrasound recently that resulted as BI-RADS 2.     2/28/2024:  When she saw Sha in September, she had been released from imaging surveillance and was going to follow-up with us as necessary.  About a month ago, she developed a lump on her inner left breast.  It gradually grew in size, became red and painful.  She underwent diagnostic imaging prior to this appointment and ultrasound showed a 1.7 cm intradermal hypoechoic lesion.  She denies any drainage.  She has not been on any antibiotics.    3/8/2024:  At her last visit, she underwent incision and drainage of an infected left breast sebaceous cyst.  She has been doing packing changes at home and it has been healing well.  She can barely fit any the packing in the wound anymore.     5/2/2024, Interval History:  She underwent left breast skin cyst excision on 4/23/24. See surgery & pathology details below in oncologic history. She has been recovering well and has no complaints.       Breast history/imaging (updated 5/2/2024):    03/02/2021, Screening MMG with [facility] ([facility]):  Scattered fibroglandular densities are seen throughout both breasts in a pattern in which is unchanged. I see no new or dominant masses, areas of architectural distortion or skin thickening. There is no evidence for axillary lymphadenopathy or nipple retraction.  BI-RADS 1: Negative    09/09/2022, Screening MMG with [facility] ([facility]):  Scattered fibroglandular densities. In the posterior one third of the right breast at the 12 o’clock position there is an area of focal  asymmetry with suspected architectural distortion. I see no suspicious calcifications in either breasts. There is no evidence for skin thickening, nipple retraction or axillary adenopathy.  BI-RADS 0: Incomplete    10/05/2022, Right Diagnostic MMG & Right Breast US ( [facility]):  MMG:  With additional imaging of the area of focal asymmetry with suspected architectural distortion in the posterior one-third of the right breast at the 12 o’clock position there is partial persistence on CC imaging, but no definite distortion is visualized. Benign-appearing calcifications in the region are noted.  US:  At the 11 o’clock position on the order of 3 cm from the nipple there is a 0.8 cm oval circumscribed hypoechoic mass with low-level internal echoes and a thin internal septation.  No internal vascularity is appreciated. The imaging features suggest a benign septated complex cyst.  In the right breast at the 10 o’clock position on the order 4 cm from the nipple there is a 0.3 cm benign-appearing cyst. At the 10 o’clock position on the order of 2 cm from the nipple there is a 0.5 cm benign-appearing cyst. No other abnormality is appreciated.  IMPRESSION:  1. Six month mammographic follow-up is recommended. Because of the possible presence of subtle architectural distortion without a sonographic correlate, correlation with a breast MRI without and with contrast is also suggested.   2. Six month sonographic follow up is recommended.  BI-RADS 3: Probably Benign    11/04/2022, Bilateral Breast MRI ( [facility]):  Right Breast:  No suspicious enhancing mass or area of non-mass enhancement is identified. However, in the middle retro areolar right breast, there is a slightly distorted appearance of the breast parenchyma, which is suspicious. This corresponds to the area of questionable distortion on mammogram. The visualized axilla is within normal limits.  Left Breast:  No suspicious enhancing mass or area of non-mass enhancement is  identified. The visualized axilla is within normal limits.  Extra mammary Findings:  There are no pathologically enlarged internal mammary chain lymph nodes on either side. In the upper sternum, there is a 1.0 x 0.6 x 0.7 cm T2 hyper intense enhancing bone lesion, which is incompletely characterized.    11/25/2022, Right Breast, Stereotactic Biopsy ( [facility]):  1. Breast, Right 12 O'clock Position:                A. Radial scar formation.                B. Clustered cysts with apocrine metaplasia and focal cyst rupture.               C. Mild ductal hyperplasia of the usual type.               D. Fibroadenomatoid change.               E. Sclerosing adenosis.               F. Focal periductal chronic inflammation.               G. Negative for atypia, in situ and invasive malignancy.   -A barbell clip. Post procedural digital mammographic views of the right breast demonstrate the barbell clip at the medial aspect of the biopsy cavity, likely displaced approximately 1.5 cm medial to the targeted area.    2/6/2023, Right breast needle-localized excisional biopsy:  1. Right Breast, Needle-Localized Excisional Biopsy (7 grams):               A. Negative for residual radial scar.  B. Clip and biopsy site changes present.  C. Breast parenchyma with usual ductal hyperplasia and apocrine metaplasia.      [facility]2023[facility]  At 11:00, 3 cm from the nipple, there is a 0.7 x 0.3 x 0.6 cm oval hypoechoic mass, which is not significantly changed from  10/05/2022, previously 0.8 x 0.3 x 0.4 cm. This remains probably benign.  A new adjacent cystic collection is identified deep to an area of surgical scarring and is in the region of interval excisional biopsy.  This finding is consistent with a benign postoperative collection.   BI-RADS 3: Probably benign.    9/12/2023, Bilateral Diagnostic [facility]G with [facility] & [facility]  MMG:  There is scattered fibroglandular density which is symmetric. The patient has  had previous right breast surgery since the mammogram dated 09/09/2022 for removal of a radial scar. There is no significant postsurgical scarring. There are no findings in either breast to suggest malignancy.  US:  The limited directed right breast ultrasound again demonstrates a small hypoechoic nodule located at 11 o'clock 3 cm from the nipple and measuring 8 x 3 x 6 mm. There is no internal vascularity or posterior shadowing and this shows no significant change from 04/21/2023. An adjacent small cyst appears smaller on today's exam.  BI-RADS 2: Benign findings.    2/26/2024, Left [facility]  MMG:  A triangular marker is visualized over the left posterior breast, slightly superior to the posterior nipple line on the MLO projection and located far medially on the CC view. On the full-field CC view, the marker is tangential to the skin. On this CC view, there is a trace of focal skin thickening in the area of the triangular marker and a trace of stranding just deep to the skin. The left breast otherwise is stable when compared with the prior exams. Ultrasound was performed for further evaluation.  US:    Ultrasound demonstrates an oval, elongated, hypoechoic skin-based lesion at the 10 o'clock position, 12 cm from the nipple. There is no blood flow within the lesion although peripheral blood flow is increased consistent with inflammation. The appearance is consistent with a ruptured sebaceous cyst. The lesion measures 1.7 x 0.4 x 1.5 cm. Other than mild inflammation immediately deep to the sebaceous cyst, the underlying breast shows no abnormality.  BI-RADS 2: Benign.    2/28/2024, Wound culture:  Body Fluid Culture: Rare Proteus mirabilis     4/23/2024, Left breast skin cyst excision  1.  Skin, left breast, 9:00, excision: Benign skin with               -A.  Area of reactive fibrous tissue and inflammation consistent with scar formation and foreign body giant cells with  foreign polarizable material consistent with suture.      Review of Systems:  See interval history.      Medications:    Current Outpatient Medications:   •  levothyroxine (SYNTHROID, LEVOTHROID) 112 MCG tablet, Take 1 tablet by mouth Every Other Day. Takes brand name synthroid, Disp: , Rfl:   •  levothyroxine (SYNTHROID, LEVOTHROID) 125 MCG tablet, Take 1 tablet by mouth Every Other Day., Disp: , Rfl:   •  Multiple Vitamins-Minerals (EMERGEN-C IMMUNE PO), Take 1 Units by mouth Daily. HELD FOR SURGERY, Disp: , Rfl:       Allergies:  No Known Allergies      Family History   Problem Relation Age of Onset   • Breast cancer Mother         Diagnosed over age 65   • Lung cancer Mother    • Colon cancer Father 65   • No Known Problems Sister    • No Known Problems Brother    • No Known Problems Daughter    • No Known Problems Son    • No Known Problems Maternal Aunt    • No Known Problems Paternal Aunt    • No Known Problems Maternal Grandmother    • No Known Problems Paternal Grandmother    • BRCA 1/2 Neg Hx    • Endometrial cancer Neg Hx    • Ovarian cancer Neg Hx    • Uterine cancer Neg Hx    • Malig Hyperthermia Neg Hx        Objective  PHYSICAL EXAMINATION:   Vitals:    05/02/24 0756   BP: 110/78   Pulse: 55   Resp: 17   SpO2: 98%     ECOG 0 - Asymptomatic  General: NAD, well appearing  Psych: a&o x 3, normal mood and affect  Eyes: EOMI, no scleral icterus  ENMT: neck supple without masses or thyromegaly, mucus membranes moist  MSK: normal gait, normal ROM in bilateral shoulders  Lymph nodes: no cervical, supraclavicular or axillary lymphadenopathy  Breast: symmetric, moderate size, grade 2 ptosis  Right: deferred.  Left: Well-healing radial incision at 9:00.      Assessment & Plan  Assessment:  1.  59 y.o. F sp incision and drainage of an infected left breast sebaceous cyst, followed by excision on 4/23/2024.  2.  She has a past history of a right breast excisional biopsy of a radial scar on 2/6/2023 with benign  final pathology.    Plan:  -Follow-up with me as necessary.  She will be due for annual screening mammogram in 9/2024.    Camila Petty MD      CC:  SOLIS Hu MD

## 2024-05-02 NOTE — PROGRESS NOTES
[facility]     Referring Provider: Suze Pierre MD     Chief complaint: Postoperative visit      Subjective   HPI:   1/18/2023:  Ms. Mar Bartholomew is a 56 yo woman, seen at the request of Dr. Suze Pierre, for a new diagnosis of a right breast radial scar. She was initially called back after screening mammogram in September for a new right breast focal asymmetry. Diagnostic imaging demonstrated a subtle area of architectural distortion on mammogram without an ultrasound correlate, as well as a separate cluster of complex cysts. The complex cysts were placed in imaging surveillance. The area of architectural distortion was recommended to undergo a breast MRI, which confirmed architectural distortion. She then underwent a stereotactic biopsy which showed a radial scar. Her work-up is detailed in the breast history section below. Prior to the biopsy, she denies any breast lumps, pain, skin changes, or nipple discharge. She has a past history of a benign left breast surgery in 1994. She has a family history of breast cancer in her mother (diagnosed after age 65).     2/22/2023:  She underwent right breast excisional biopsy on 2/6/23. See surgery & pathology details in breast history section below. She has been recovering well and has no complaints.     5/9/2023, Seen by SOLIS Watson:  Patient presenting to the office today for routine follow-up. She has no new breast complaints or concerns. She had a right limited ultrasound on 4/21/2020 that returned as a BI-RADS 3. She needs to have a ameya dx mammo and targeted right breast ultrasound in September to document 1 year of stability.     9/19/2023, Seen by SOLIS Watson:  Patient presenting to the office today for routine follow-up. She has no new breast complaints or concerns today. She had a bilateral diagnostic mammogram and right limited ultrasound recently that resulted as BI-RADS 2.     2/28/2024:  When she saw Sha in September, she had  been released from imaging surveillance and was going to follow-up with us as necessary.  About a month ago, she developed a lump on her inner left breast.  It gradually grew in size, became red and painful.  She underwent diagnostic imaging prior to this appointment and ultrasound showed a 1.7 cm intradermal hypoechoic lesion.  She denies any drainage.  She has not been on any antibiotics.    3/8/2024:  At her last visit, she underwent incision and drainage of an infected left breast sebaceous cyst.  She has been doing packing changes at home and it has been healing well.  She can barely fit any the packing in the wound anymore.     5/2/2024, Interval History:  She underwent left breast skin cyst excision on 4/23/24. See surgery & pathology details below in oncologic history. She has been recovering well and has no complaints.       Breast history/imaging (updated 5/2/2024):    03/02/2021, Screening MMG with [facility] ( [facility]):  Scattered fibroglandular densities are seen throughout both breasts in a pattern in which is unchanged. I see no new or dominant masses, areas of architectural distortion or skin thickening. There is no evidence for axillary lymphadenopathy or nipple retraction.  BI-RADS 1: Negative    09/09/2022, Screening MMG with [facility] ( [facility]):  Scattered fibroglandular densities. In the posterior one third of the right breast at the 12 o’clock position there is an area of focal asymmetry with suspected architectural distortion. I see no suspicious calcifications in either breasts. There is no evidence for skin thickening, nipple retraction or axillary adenopathy.  BI-RADS 0: Incomplete    10/05/2022, Right Diagnostic MMG & Right Breast US ( [facility]):  MMG:  With additional imaging of the area of focal asymmetry with suspected architectural distortion in the posterior one-third of the right breast at the 12 o’clock position there is partial persistence on CC imaging, but no definite distortion is visualized.  Benign-appearing calcifications in the region are noted.  US:  At the 11 o’clock position on the order of 3 cm from the nipple there is a 0.8 cm oval circumscribed hypoechoic mass with low-level internal echoes and a thin internal septation.  No internal vascularity is appreciated. The imaging features suggest a benign septated complex cyst.  In the right breast at the 10 o’clock position on the order 4 cm from the nipple there is a 0.3 cm benign-appearing cyst. At the 10 o’clock position on the order of 2 cm from the nipple there is a 0.5 cm benign-appearing cyst. No other abnormality is appreciated.  IMPRESSION:  1. Six month mammographic follow-up is recommended. Because of the possible presence of subtle architectural distortion without a sonographic correlate, correlation with a breast MRI without and with contrast is also suggested.   2. Six month sonographic follow up is recommended.  BI-RADS 3: Probably Benign    [facility]20[facility]  Right Breast:  No suspicious enhancing mass or area of non-mass enhancement is identified. However, in the middle retro areolar right breast, there is a slightly distorted appearance of the breast parenchyma, which is suspicious. This corresponds to the area of questionable distortion on mammogram. The visualized axilla is within normal limits.  Left Breast:  No suspicious enhancing mass or area of non-mass enhancement is identified. The visualized axilla is within normal limits.  Extra mammary Findings:  There are no pathologically enlarged internal mammary chain lymph nodes on either side. In the upper sternum, there is a 1.0 x 0.6 x 0.7 cm T2 hyper intense enhancing bone lesion, which is incompletely characterized.    11/25/2022, Right Breast, Stereotactic Biopsy ( [facility]):  1. Breast, Right 12 O'clock Position:                A. Radial scar formation.                B. Clustered cysts with apocrine metaplasia and focal cyst rupture.               C.  Mild ductal hyperplasia of the usual type.               D. Fibroadenomatoid change.               E. Sclerosing adenosis.               F. Focal periductal chronic inflammation.               G. Negative for atypia, in situ and invasive malignancy.   -A barbell clip. Post procedural digital mammographic views of the right breast demonstrate the barbell clip at the medial aspect of the biopsy cavity, likely displaced approximately 1.5 cm medial to the targeted area.    2/6/2023, Right breast needle-localized excisional biopsy:  1. Right Breast, Needle-Localized Excisional Biopsy (7 grams):               A. Negative for residual radial scar.  B. Clip and biopsy site changes present.  C. Breast parenchyma with usual ductal hyperplasia and apocrine metaplasia.      4/21/2023, Right Breast US ([facility]):  At 11:00, 3 cm from the nipple, there is a 0.7 x 0.3 x 0.6 cm oval hypoechoic mass, which is not significantly changed from  10/05/2022, previously 0.8 x 0.3 x 0.4 cm. This remains probably benign.  A new adjacent cystic collection is identified deep to an area of surgical scarring and is in the region of interval excisional biopsy.  This finding is consistent with a benign postoperative collection.   BI-RADS 3: Probably benign.    9/12/2023, Bilateral Diagnostic MMG with [facility] & Right Breast US ([facility]):  MMG:  There is scattered fibroglandular density which is symmetric. The patient has had previous right breast surgery since the mammogram dated 09/09/2022 for removal of a radial scar. There is no significant postsurgical scarring. There are no findings in either breast to suggest malignancy.  US:  The limited directed right breast ultrasound again demonstrates a small hypoechoic nodule located at 11 o'clock 3 cm from the nipple and measuring 8 x 3 x 6 mm. There is no internal vascularity or posterior shadowing and this shows no significant change from 04/21/2023. An adjacent small cyst appears smaller on today's  exam.  BI-RADS 2: Benign findings.    2/26/2024, Left Diagnostic MMG with [facility] & Left Breast US ([facility]):  MMG:  A triangular marker is visualized over the left posterior breast, slightly superior to the posterior nipple line on the MLO projection and located far medially on the CC view. On the full-field CC view, the marker is tangential to the skin. On this CC view, there is a trace of focal skin thickening in the area of the triangular marker and a trace of stranding just deep to the skin. The left breast otherwise is stable when compared with the prior exams. Ultrasound was performed for further evaluation.  US:    Ultrasound demonstrates an oval, elongated, hypoechoic skin-based lesion at the 10 o'clock position, 12 cm from the nipple. There is no blood flow within the lesion although peripheral blood flow is increased consistent with inflammation. The appearance is consistent with a ruptured sebaceous cyst. The lesion measures 1.7 x 0.4 x 1.5 cm. Other than mild inflammation immediately deep to the sebaceous cyst, the underlying breast shows no abnormality.  BI-RADS 2: Benign.    2/28/2024, Wound culture:  Body Fluid Culture: Rare Proteus mirabilis     4/23/2024, Left breast skin cyst excision  1.  Skin, left breast, 9:00, excision: Benign skin with               -A.  Area of reactive fibrous tissue and inflammation consistent with scar formation and foreign body giant cells with foreign polarizable material consistent with suture.      Review of Systems:  See interval history.      Medications:    Current Outpatient Medications:     levothyroxine (SYNTHROID, LEVOTHROID) 112 MCG tablet, Take 1 tablet by mouth Every Other Day. Takes brand name synthroid, Disp: , Rfl:     levothyroxine (SYNTHROID, LEVOTHROID) 125 MCG tablet, Take 1 tablet by mouth Every Other Day., Disp: , Rfl:     Multiple Vitamins-Minerals (EMERGEN-C IMMUNE PO), Take 1 Units by mouth Daily. HELD FOR SURGERY, Disp: , Rfl:       Allergies:  No  Known Allergies      Family History   Problem Relation Age of Onset    Breast cancer Mother         Diagnosed over age 65    Lung cancer Mother     Colon cancer Father 65    No Known Problems Sister     No Known Problems Brother     No Known Problems Daughter     No Known Problems Son     No Known Problems Maternal Aunt     No Known Problems Paternal Aunt     No Known Problems Maternal Grandmother     No Known Problems Paternal Grandmother     BRCA 1/2 Neg Hx     Endometrial cancer Neg Hx     Ovarian cancer Neg Hx     Uterine cancer Neg Hx     Malig Hyperthermia Neg Hx        Objective   PHYSICAL EXAMINATION:   Vitals:    05/02/24 0756   BP: 110/78   Pulse: 55   Resp: 17   SpO2: 98%     ECOG 0 - Asymptomatic  General: NAD, well appearing  Psych: a&o x 3, normal mood and affect  Eyes: EOMI, no scleral icterus  ENMT: neck supple without masses or thyromegaly, mucus membranes moist  MSK: normal gait, normal ROM in bilateral shoulders  Lymph nodes: no cervical, supraclavicular or axillary lymphadenopathy  Breast: symmetric, moderate size, grade 2 ptosis  Right: deferred.  Left: Well-healing radial incision at 9:00.      Assessment & Plan   Assessment:  1.  59 y.o. F sp incision and drainage of an infected left breast sebaceous cyst, followed by excision on 4/23/2024.  2.  She has a past history of a right breast excisional biopsy of a radial scar on 2/6/2023 with benign final pathology.    Plan:  -Follow-up with me as necessary.  She will be due for annual screening mammogram in 9/2024.    Camila Petty MD      CC:  SOLIS Hu MD

## 2024-05-07 ENCOUNTER — TRANSCRIBE ORDERS (OUTPATIENT)
Dept: ADMINISTRATIVE | Facility: HOSPITAL | Age: 59
End: 2024-05-07
Payer: COMMERCIAL

## 2024-05-07 ENCOUNTER — LAB (OUTPATIENT)
Dept: LAB | Facility: HOSPITAL | Age: 59
End: 2024-05-07
Payer: COMMERCIAL

## 2024-05-07 DIAGNOSIS — E03.9 HYPOTHYROIDISM, UNSPECIFIED TYPE: ICD-10-CM

## 2024-05-07 DIAGNOSIS — Z00.00 ROUTINE GENERAL MEDICAL EXAMINATION AT A HEALTH CARE FACILITY: Primary | ICD-10-CM

## 2024-05-07 DIAGNOSIS — Z00.00 ROUTINE GENERAL MEDICAL EXAMINATION AT A HEALTH CARE FACILITY: ICD-10-CM

## 2024-05-07 LAB
ALBUMIN SERPL-MCNC: 4 G/DL (ref 3.5–5.2)
ALBUMIN/GLOB SERPL: 1.9 G/DL
ALP SERPL-CCNC: 85 U/L (ref 39–117)
ALT SERPL W P-5'-P-CCNC: 10 U/L (ref 1–33)
ANION GAP SERPL CALCULATED.3IONS-SCNC: 9.3 MMOL/L (ref 5–15)
AST SERPL-CCNC: 12 U/L (ref 1–32)
BACTERIA UR QL AUTO: NORMAL /HPF
BASOPHILS # BLD AUTO: 0.03 10*3/MM3 (ref 0–0.2)
BASOPHILS NFR BLD AUTO: 0.8 % (ref 0–1.5)
BILIRUB SERPL-MCNC: 0.3 MG/DL (ref 0–1.2)
BILIRUB UR QL STRIP: NEGATIVE
BUN SERPL-MCNC: 16 MG/DL (ref 6–20)
BUN/CREAT SERPL: 22.5 (ref 7–25)
CALCIUM SPEC-SCNC: 9.5 MG/DL (ref 8.6–10.5)
CHLORIDE SERPL-SCNC: 106 MMOL/L (ref 98–107)
CHOLEST SERPL-MCNC: 186 MG/DL (ref 0–200)
CLARITY UR: ABNORMAL
CO2 SERPL-SCNC: 25.7 MMOL/L (ref 22–29)
COLOR UR: YELLOW
CREAT SERPL-MCNC: 0.71 MG/DL (ref 0.57–1)
DEPRECATED RDW RBC AUTO: 43.8 FL (ref 37–54)
EGFRCR SERPLBLD CKD-EPI 2021: 98.1 ML/MIN/1.73
EOSINOPHIL # BLD AUTO: 0.11 10*3/MM3 (ref 0–0.4)
EOSINOPHIL NFR BLD AUTO: 2.9 % (ref 0.3–6.2)
ERYTHROCYTE [DISTWIDTH] IN BLOOD BY AUTOMATED COUNT: 12.9 % (ref 12.3–15.4)
GLOBULIN UR ELPH-MCNC: 2.1 GM/DL
GLUCOSE SERPL-MCNC: 102 MG/DL (ref 65–99)
GLUCOSE UR STRIP-MCNC: NEGATIVE MG/DL
HCT VFR BLD AUTO: 37.4 % (ref 34–46.6)
HDLC SERPL-MCNC: 87 MG/DL (ref 40–60)
HGB BLD-MCNC: 12.4 G/DL (ref 12–15.9)
HGB UR QL STRIP.AUTO: NEGATIVE
HYALINE CASTS UR QL AUTO: NORMAL /LPF
IMM GRANULOCYTES # BLD AUTO: 0.01 10*3/MM3 (ref 0–0.05)
IMM GRANULOCYTES NFR BLD AUTO: 0.3 % (ref 0–0.5)
KETONES UR QL STRIP: NEGATIVE
LDLC SERPL CALC-MCNC: 89 MG/DL (ref 0–100)
LDLC/HDLC SERPL: 1.02 {RATIO}
LEUKOCYTE ESTERASE UR QL STRIP.AUTO: ABNORMAL
LYMPHOCYTES # BLD AUTO: 1.71 10*3/MM3 (ref 0.7–3.1)
LYMPHOCYTES NFR BLD AUTO: 44.9 % (ref 19.6–45.3)
MCH RBC QN AUTO: 30.8 PG (ref 26.6–33)
MCHC RBC AUTO-ENTMCNC: 33.2 G/DL (ref 31.5–35.7)
MCV RBC AUTO: 92.8 FL (ref 79–97)
MONOCYTES # BLD AUTO: 0.33 10*3/MM3 (ref 0.1–0.9)
MONOCYTES NFR BLD AUTO: 8.7 % (ref 5–12)
NEUTROPHILS NFR BLD AUTO: 1.62 10*3/MM3 (ref 1.7–7)
NEUTROPHILS NFR BLD AUTO: 42.4 % (ref 42.7–76)
NITRITE UR QL STRIP: NEGATIVE
NRBC BLD AUTO-RTO: 0 /100 WBC (ref 0–0.2)
PH UR STRIP.AUTO: 6 [PH] (ref 5–8)
PLATELET # BLD AUTO: 242 10*3/MM3 (ref 140–450)
PMV BLD AUTO: 10.2 FL (ref 6–12)
POTASSIUM SERPL-SCNC: 4.1 MMOL/L (ref 3.5–5.2)
PROT SERPL-MCNC: 6.1 G/DL (ref 6–8.5)
PROT UR QL STRIP: NEGATIVE
RBC # BLD AUTO: 4.03 10*6/MM3 (ref 3.77–5.28)
RBC # UR STRIP: NORMAL /HPF
REF LAB TEST METHOD: NORMAL
SODIUM SERPL-SCNC: 141 MMOL/L (ref 136–145)
SP GR UR STRIP: 1.03 (ref 1–1.03)
SQUAMOUS #/AREA URNS HPF: NORMAL /HPF
TRIGL SERPL-MCNC: 53 MG/DL (ref 0–150)
TSH SERPL DL<=0.05 MIU/L-ACNC: 0.1 UIU/ML (ref 0.27–4.2)
UROBILINOGEN UR QL STRIP: ABNORMAL
VLDLC SERPL-MCNC: 10 MG/DL (ref 5–40)
WBC # UR STRIP: NORMAL /HPF
WBC NRBC COR # BLD AUTO: 3.81 10*3/MM3 (ref 3.4–10.8)

## 2024-05-07 PROCEDURE — 80061 LIPID PANEL: CPT

## 2024-05-07 PROCEDURE — 81001 URINALYSIS AUTO W/SCOPE: CPT

## 2024-05-07 PROCEDURE — 80050 GENERAL HEALTH PANEL: CPT

## 2024-05-07 PROCEDURE — 36415 COLL VENOUS BLD VENIPUNCTURE: CPT

## 2024-08-20 ENCOUNTER — TRANSCRIBE ORDERS (OUTPATIENT)
Dept: ADMINISTRATIVE | Facility: HOSPITAL | Age: 59
End: 2024-08-20
Payer: COMMERCIAL

## 2024-08-20 ENCOUNTER — LAB (OUTPATIENT)
Dept: LAB | Facility: HOSPITAL | Age: 59
End: 2024-08-20
Payer: COMMERCIAL

## 2024-08-20 DIAGNOSIS — E03.9 ACQUIRED HYPOTHYROIDISM: Primary | ICD-10-CM

## 2024-08-20 DIAGNOSIS — E03.9 ACQUIRED HYPOTHYROIDISM: ICD-10-CM

## 2024-08-20 LAB — TSH SERPL DL<=0.05 MIU/L-ACNC: 0.31 UIU/ML (ref 0.27–4.2)

## 2024-08-20 PROCEDURE — 36415 COLL VENOUS BLD VENIPUNCTURE: CPT

## 2024-08-20 PROCEDURE — 84443 ASSAY THYROID STIM HORMONE: CPT

## 2024-09-09 ENCOUNTER — OFFICE VISIT (OUTPATIENT)
Dept: OBSTETRICS AND GYNECOLOGY | Age: 59
End: 2024-09-09
Payer: COMMERCIAL

## 2024-09-09 VITALS
DIASTOLIC BLOOD PRESSURE: 68 MMHG | BODY MASS INDEX: 25.62 KG/M2 | SYSTOLIC BLOOD PRESSURE: 108 MMHG | HEIGHT: 71 IN | WEIGHT: 183 LBS

## 2024-09-09 DIAGNOSIS — Z01.419 ENCOUNTER FOR GYNECOLOGICAL EXAMINATION WITHOUT ABNORMAL FINDING: Primary | ICD-10-CM

## 2024-09-09 DIAGNOSIS — N81.6 CYSTOCELE WITH RECTOCELE: ICD-10-CM

## 2024-09-09 DIAGNOSIS — N81.10 CYSTOCELE WITH RECTOCELE: ICD-10-CM

## 2024-09-09 DIAGNOSIS — Z12.31 BREAST CANCER SCREENING BY MAMMOGRAM: ICD-10-CM

## 2024-09-09 DIAGNOSIS — N39.3 FEMALE STRESS INCONTINENCE: ICD-10-CM

## 2024-09-09 DIAGNOSIS — N95.1 MENOPAUSAL SYMPTOMS: ICD-10-CM

## 2024-09-09 DIAGNOSIS — N95.8 GENITOURINARY SYNDROME OF MENOPAUSE: ICD-10-CM

## 2024-09-09 DIAGNOSIS — Z98.890 HISTORY OF ENDOMETRIAL ABLATION: ICD-10-CM

## 2024-09-09 DIAGNOSIS — Z12.4 SCREENING FOR MALIGNANT NEOPLASM OF THE CERVIX: ICD-10-CM

## 2024-09-09 PROCEDURE — 99396 PREV VISIT EST AGE 40-64: CPT | Performed by: OBSTETRICS & GYNECOLOGY

## 2024-09-09 NOTE — PROGRESS NOTES
Subjective   Mar Bartholomew is a 59 y.o. female presents for annual visit today ,  last pap 08/18/23  neg ,Bilateral Diagnostic MMG with Blaise & Right Breast US on 9/12/23 additional testing 2/26/24 benign ,breast surgery 4/23/24 @ Dr Petty neg results,  colonoscopy 9/28/20 due 2025 , dexa 2018 . Patient needs an order for mammogram , no longer taking bijuva did not benefit from it plus was expensive , c/o increase in bladder leaking , hot flashes .  Patient is unsure if she would like to try something else at this point.  Recommended the new menopause book.  Patient will read and then let me know if she would like to try something less expensive than Bijuva.  Also will refer patient to urogynecology for stress urinary incontinence and rectocele and cystocele.  She did have a TVT sling with Dr. Wu in 2020.  I saw her in October for follow up on HRT - bijuva using it daily going well would like to stay on it sleeping better not feeling irritable.      I had previously seen her for her annual - Patient reported she had noticed an increase in depression since the beginning of the summer as well as irritability and hot flashes and night sweats.  She reports she does not feel like herself.  Discussed options including hormone replacement therapy.  Discussed risk including mild increase in breast cancer heart attack stroke and blood clots.  [place] article given to patient.  Samples of Bijuva given to patient.  Plan was to follow-up in 8 weeks to assess her symptoms.  History of Present Illness    The following portions of the patient's history were reviewed and updated as appropriate: allergies, current medications, past family history, past medical history, past social history, past surgical history, and problem list.    Review of Systems   Constitutional:  Negative for chills, fatigue and fever.   Gastrointestinal:  Negative for abdominal pain.   Genitourinary:  Negative for dysuria, menstrual problem, pelvic  "pain, vaginal bleeding, vaginal discharge and vaginal pain.        + leakage of urine    All other systems reviewed and are negative.    /68   Ht 180.3 cm (71\")   Wt 83 kg (183 lb)   LMP  (LMP Unknown)   BMI 25.52 kg/m²     Objective   Physical Exam  Vitals and nursing note reviewed.   Constitutional:       Appearance: Normal appearance. She is well-developed and normal weight.   Neck:      Thyroid: No thyromegaly.   Pulmonary:      Effort: Pulmonary effort is normal.   Chest:   Breasts:     Right: No mass, nipple discharge, skin change or tenderness.      Left: No mass, nipple discharge, skin change or tenderness.   Abdominal:      General: There is no distension.      Palpations: Abdomen is soft.      Tenderness: There is no abdominal tenderness.   Genitourinary:     General: Normal vulva.      Exam position: Lithotomy position.      Labia:         Right: No rash or lesion.         Left: No rash or lesion.       Vagina: Normal. Prolapsed vaginal walls present.      Cervix: No friability or lesion.      Uterus: Not enlarged and not tender.       Adnexa:         Right: No mass or tenderness.          Left: No mass or tenderness.        Comments: Positive rectocele to introitus  Positive cystocele to introitus on Valsalva  Musculoskeletal:         General: Normal range of motion.      Cervical back: Normal range of motion.   Skin:     General: Skin is warm and dry.      Findings: No rash.   Neurological:      Mental Status: She is alert and oriented to person, place, and time.   Psychiatric:         Mood and Affect: Mood normal.         Behavior: Behavior normal.           Assessment & Plan   Diagnoses and all orders for this visit:    1. Encounter for gynecological examination without abnormal finding (Primary)    2. History of endometrial ablation    3. Menopausal symptoms    4. Genitourinary syndrome of menopause  -     Ambulatory Referral to Gynecologic Urology    5. Breast cancer screening by " mammogram  -     Mammo screening digital tomosynthesis bilateral w CAD; Future    6. Cystocele with rectocele  -     Ambulatory Referral to Gynecologic Urology    7. Female stress incontinence  -     Ambulatory Referral to Gynecologic Urology        Counseling was given to patient for the following topics: instructions for management, importance of treatment compliance, and self-breast exams   .  Return in about 1 year (around 9/9/2025) for Annual physical.

## 2024-09-12 LAB
CYTOLOGIST CVX/VAG CYTO: NORMAL
CYTOLOGY CVX/VAG DOC CYTO: NORMAL
CYTOLOGY CVX/VAG DOC THIN PREP: NORMAL
DX ICD CODE: NORMAL
HPV I/H RISK 4 DNA CVX QL PROBE+SIG AMP: NEGATIVE
Lab: NORMAL
OTHER STN SPEC: NORMAL
STAT OF ADQ CVX/VAG CYTO-IMP: NORMAL

## 2024-12-19 ENCOUNTER — HOSPITAL ENCOUNTER (OUTPATIENT)
Dept: MAMMOGRAPHY | Facility: HOSPITAL | Age: 59
Discharge: HOME | End: 2024-12-19
Admitting: OBSTETRICS & GYNECOLOGY
Payer: COMMERCIAL

## 2024-12-19 DIAGNOSIS — Z12.31 BREAST CANCER SCREENING BY MAMMOGRAM: ICD-10-CM

## 2024-12-19 PROCEDURE — 77063 BREAST TOMOSYNTHESIS BI: CPT

## 2024-12-19 PROCEDURE — 77067 SCR MAMMO BI INCL CAD: CPT

## 2025-05-09 ENCOUNTER — LAB (OUTPATIENT)
Dept: LAB | Facility: HOSPITAL | Age: 60
End: 2025-05-09
Payer: COMMERCIAL

## 2025-05-09 ENCOUNTER — TRANSCRIBE ORDERS (OUTPATIENT)
Dept: ADMINISTRATIVE | Facility: HOSPITAL | Age: 60
End: 2025-05-09
Payer: COMMERCIAL

## 2025-05-09 DIAGNOSIS — Z00.00 GENERAL MEDICAL EXAM: ICD-10-CM

## 2025-05-09 DIAGNOSIS — E03.9 HYPOTHYROIDISM, UNSPECIFIED TYPE: ICD-10-CM

## 2025-05-09 DIAGNOSIS — Z00.00 GENERAL MEDICAL EXAM: Primary | ICD-10-CM

## 2025-05-09 LAB
ALBUMIN SERPL-MCNC: 4.3 G/DL (ref 3.5–5.2)
ALBUMIN/GLOB SERPL: 1.8 G/DL
ALP SERPL-CCNC: 80 U/L (ref 39–117)
ALT SERPL W P-5'-P-CCNC: 14 U/L (ref 1–33)
ANION GAP SERPL CALCULATED.3IONS-SCNC: 8.7 MMOL/L (ref 5–15)
AST SERPL-CCNC: 21 U/L (ref 1–32)
BACTERIA UR QL AUTO: NORMAL /HPF
BASOPHILS # BLD AUTO: 0.03 10*3/MM3 (ref 0–0.2)
BASOPHILS NFR BLD AUTO: 0.7 % (ref 0–1.5)
BILIRUB SERPL-MCNC: 0.4 MG/DL (ref 0–1.2)
BILIRUB UR QL STRIP: NEGATIVE
BUN SERPL-MCNC: 17 MG/DL (ref 8–23)
BUN/CREAT SERPL: 20.5 (ref 7–25)
CALCIUM SPEC-SCNC: 9.8 MG/DL (ref 8.6–10.5)
CHLORIDE SERPL-SCNC: 105 MMOL/L (ref 98–107)
CHOLEST SERPL-MCNC: 213 MG/DL (ref 0–200)
CLARITY UR: CLEAR
CO2 SERPL-SCNC: 25.3 MMOL/L (ref 22–29)
COLOR UR: YELLOW
CREAT SERPL-MCNC: 0.83 MG/DL (ref 0.57–1)
DEPRECATED RDW RBC AUTO: 42.9 FL (ref 37–54)
EGFRCR SERPLBLD CKD-EPI 2021: 80.8 ML/MIN/1.73
EOSINOPHIL # BLD AUTO: 0.08 10*3/MM3 (ref 0–0.4)
EOSINOPHIL NFR BLD AUTO: 1.8 % (ref 0.3–6.2)
ERYTHROCYTE [DISTWIDTH] IN BLOOD BY AUTOMATED COUNT: 12.4 % (ref 12.3–15.4)
GLOBULIN UR ELPH-MCNC: 2.4 GM/DL
GLUCOSE SERPL-MCNC: 97 MG/DL (ref 65–99)
GLUCOSE UR STRIP-MCNC: NEGATIVE MG/DL
HCT VFR BLD AUTO: 39.7 % (ref 34–46.6)
HDLC SERPL-MCNC: 97 MG/DL (ref 40–60)
HGB BLD-MCNC: 12.9 G/DL (ref 12–15.9)
HGB UR QL STRIP.AUTO: NEGATIVE
HYALINE CASTS UR QL AUTO: NORMAL /LPF
IMM GRANULOCYTES # BLD AUTO: 0.01 10*3/MM3 (ref 0–0.05)
IMM GRANULOCYTES NFR BLD AUTO: 0.2 % (ref 0–0.5)
KETONES UR QL STRIP: ABNORMAL
LDLC SERPL CALC-MCNC: 107 MG/DL (ref 0–100)
LDLC/HDLC SERPL: 1.09 {RATIO}
LEUKOCYTE ESTERASE UR QL STRIP.AUTO: ABNORMAL
LYMPHOCYTES # BLD AUTO: 1.83 10*3/MM3 (ref 0.7–3.1)
LYMPHOCYTES NFR BLD AUTO: 42.1 % (ref 19.6–45.3)
MCH RBC QN AUTO: 30.6 PG (ref 26.6–33)
MCHC RBC AUTO-ENTMCNC: 32.5 G/DL (ref 31.5–35.7)
MCV RBC AUTO: 94.3 FL (ref 79–97)
MONOCYTES # BLD AUTO: 0.33 10*3/MM3 (ref 0.1–0.9)
MONOCYTES NFR BLD AUTO: 7.6 % (ref 5–12)
NEUTROPHILS NFR BLD AUTO: 2.07 10*3/MM3 (ref 1.7–7)
NEUTROPHILS NFR BLD AUTO: 47.6 % (ref 42.7–76)
NITRITE UR QL STRIP: NEGATIVE
NRBC BLD AUTO-RTO: 0 /100 WBC (ref 0–0.2)
PH UR STRIP.AUTO: 5.5 [PH] (ref 5–8)
PLATELET # BLD AUTO: 268 10*3/MM3 (ref 140–450)
PMV BLD AUTO: 10 FL (ref 6–12)
POTASSIUM SERPL-SCNC: 4.4 MMOL/L (ref 3.5–5.2)
PROT SERPL-MCNC: 6.7 G/DL (ref 6–8.5)
PROT UR QL STRIP: NEGATIVE
RBC # BLD AUTO: 4.21 10*6/MM3 (ref 3.77–5.28)
RBC # UR STRIP: NORMAL /HPF
REF LAB TEST METHOD: NORMAL
SODIUM SERPL-SCNC: 139 MMOL/L (ref 136–145)
SP GR UR STRIP: 1.03 (ref 1–1.03)
SQUAMOUS #/AREA URNS HPF: NORMAL /HPF
TRIGL SERPL-MCNC: 49 MG/DL (ref 0–150)
TSH SERPL DL<=0.05 MIU/L-ACNC: 0.44 UIU/ML (ref 0.27–4.2)
UROBILINOGEN UR QL STRIP: ABNORMAL
VLDLC SERPL-MCNC: 9 MG/DL (ref 5–40)
WBC # UR STRIP: NORMAL /HPF
WBC NRBC COR # BLD AUTO: 4.35 10*3/MM3 (ref 3.4–10.8)

## 2025-05-09 PROCEDURE — 81001 URINALYSIS AUTO W/SCOPE: CPT

## 2025-05-09 PROCEDURE — 80050 GENERAL HEALTH PANEL: CPT

## 2025-05-09 PROCEDURE — 80061 LIPID PANEL: CPT

## 2025-05-09 PROCEDURE — 36415 COLL VENOUS BLD VENIPUNCTURE: CPT

## 2025-07-14 ENCOUNTER — TELEPHONE (OUTPATIENT)
Dept: OBSTETRICS AND GYNECOLOGY | Age: 60
End: 2025-07-14
Payer: COMMERCIAL

## 2025-07-15 ENCOUNTER — PATIENT ROUNDING (BHMG ONLY) (OUTPATIENT)
Age: 60
End: 2025-07-15
Payer: COMMERCIAL

## 2025-07-15 NOTE — ED NOTES
Thank you for letting us care for you in your recent visit to our [facility] urgent care center. We would love to hear about your experience with us. Was this the first time you have visited our location?         We’re always looking for ways to make our patients’ experiences even better. Do you have any recommendations on ways we may improve?         I appreciate you taking the time to respond. Please be on the lookout for a survey about your recent visit from [facility] via text or email. We would greatly appreciate if you could fill that out and turn it back in. We want your voice to be heard and we value your feedback.    Thank you for choosing [facility] for your healthcare needs.         If you have concerns or would like to speak to me in person regarding your visit please feel free to give me a call. [phone]         Hope you get well soon and thank you.         Sarai Dweey  Practice Manager

## (undated) DEVICE — HYPODERMIC SAFETY NEEDLE: Brand: MONOJECT

## (undated) DEVICE — LEGGINGS, PAIR, 31X48, STERILE: Brand: MEDLINE

## (undated) DEVICE — APPL CHLORAPREP HI/LITE 26ML ORNG

## (undated) DEVICE — TUBING, SUCTION, 1/4" X 10', STRAIGHT: Brand: MEDLINE

## (undated) DEVICE — THE SINGLE USE ETRAP – POLYP TRAP IS USED FOR SUCTION RETRIEVAL OF ENDOSCOPICALLY REMOVED POLYPS.: Brand: ETRAP

## (undated) DEVICE — GOWN,SIRUS,NON REINFRCD,LARGE,SET IN SL: Brand: MEDLINE

## (undated) DEVICE — DRAPE,REIN 53X77,STERILE: Brand: MEDLINE

## (undated) DEVICE — KT ORCA ORCAPOD DISP STRL

## (undated) DEVICE — SUT MNCRYL PLS ANTIB UD 4/0 PS2 18IN

## (undated) DEVICE — MAT FLR ABS LIQUILOC 28X56IN PNK

## (undated) DEVICE — LEGGINGS, PAIR, CLEAR, STERILE: Brand: MEDLINE

## (undated) DEVICE — SYR LL TP 10ML STRL

## (undated) DEVICE — TBG PENCL TELESCP MEGADYNE SMOKE EVAC 10FT

## (undated) DEVICE — GLV SURG SENSICARE POLYISPRN W/ALOE PF LF 6.5 GRN STRL

## (undated) DEVICE — ADAPT CLN BIOGUARD AIR/H2O DISP

## (undated) DEVICE — SENSR O2 OXIMAX FNGR A/ 18IN NONSTR

## (undated) DEVICE — STPLR SKIN VISISTAT WD 35CT

## (undated) DEVICE — ADHS SKIN DERMABOND TOP ADVANCED

## (undated) DEVICE — GLV SURG BIOGEL LTX PF 6 1/2

## (undated) DEVICE — ANTIBACTERIAL UNDYED BRAIDED (POLYGLACTIN 910), SYNTHETIC ABSORBABLE SUTURE: Brand: COATED VICRYL

## (undated) DEVICE — ELECTRD BLD EZ CLN MOD XLNG 2.75IN

## (undated) DEVICE — DRP Z/FRICTION 10X16IN

## (undated) DEVICE — THE TORRENT IRRIGATION SCOPE CONNECTOR IS USED WITH THE TORRENT IRRIGATION TUBING TO PROVIDE IRRIGATION FLUIDS SUCH AS STERILE WATER DURING GASTROINTESTINAL ENDOSCOPIC PROCEDURES WHEN USED IN CONJUNCTION WITH AN IRRIGATION PUMP (OR ELECTROSURGICAL UNIT).: Brand: TORRENT

## (undated) DEVICE — PK CHST BRST 40

## (undated) DEVICE — PUNCH BIOP 2MM

## (undated) DEVICE — NDL HYPO PRECISIONGLIDE REG 25G 1 1/2

## (undated) DEVICE — SINGLE-USE BIOPSY FORCEPS: Brand: RADIAL JAW 4

## (undated) DEVICE — ADHS SKIN SURG TISS VISC PREMIERPRO EXOFIN HI/VISC FAST/DRY

## (undated) DEVICE — COVER,MAYO STAND,STERILE: Brand: MEDLINE

## (undated) DEVICE — SNAR POLYP SENSATION STDOVL 27 240 BX40

## (undated) DEVICE — PK HYST VAG 40

## (undated) DEVICE — SUT VIC 0 TIES 18IN J912G

## (undated) DEVICE — SUT SILK 2/0 SH 30IN K833H

## (undated) DEVICE — CANN O2 ETCO2 FITS ALL CONN CO2 SMPL A/ 7IN DISP LF

## (undated) DEVICE — GLV SURG SENSICARE PI MIC PF SZ6.5 LF STRL

## (undated) DEVICE — NDL HYPO ECLPS SFTY 22G 1 1/2IN

## (undated) DEVICE — DRAPE,UNDERBUTTOCKS,PCH,STERILE: Brand: MEDLINE

## (undated) DEVICE — TRAP FLD MINIVAC MEGADYNE 100ML

## (undated) DEVICE — SYR CONTRL LUERLOK 10CC

## (undated) DEVICE — ST IRR CYSTO W/SPK 77IN LF

## (undated) DEVICE — NEEDLE, QUINCKE, 18GX3.5": Brand: MEDLINE

## (undated) DEVICE — LN SMPL CO2 SHTRM SD STREAM W/M LUER

## (undated) DEVICE — 3M™ STERI-DRAPE™ INSTRUMENT POUCH 1018: Brand: STERI-DRAPE™

## (undated) DEVICE — 1010 S-DRAPE TOWEL DRAPE 10/BX: Brand: STERI-DRAPE™

## (undated) DEVICE — UNDYED BRAIDED (POLYGLACTIN 910), SYNTHETIC ABSORBABLE SUTURE: Brand: COATED VICRYL